# Patient Record
Sex: MALE | Race: WHITE | HISPANIC OR LATINO | Employment: FULL TIME | URBAN - METROPOLITAN AREA
[De-identification: names, ages, dates, MRNs, and addresses within clinical notes are randomized per-mention and may not be internally consistent; named-entity substitution may affect disease eponyms.]

---

## 2017-10-16 ENCOUNTER — APPOINTMENT (OUTPATIENT)
Dept: SOCIAL WORK | Facility: CLINIC | Age: 52
End: 2017-10-16

## 2017-10-16 PROCEDURE — 90686 IIV4 VACC NO PRSV 0.5 ML IM: CPT | Performed by: REGISTERED NURSE

## 2018-10-03 ENCOUNTER — IMMUNIZATION (OUTPATIENT)
Dept: SOCIAL WORK | Facility: CLINIC | Age: 53
End: 2018-10-03

## 2018-10-03 DIAGNOSIS — Z23 NEED FOR VACCINATION: ICD-10-CM

## 2018-10-03 PROCEDURE — 90686 IIV4 VACC NO PRSV 0.5 ML IM: CPT | Performed by: REGISTERED NURSE

## 2019-09-18 ENCOUNTER — HOSPITAL ENCOUNTER (OUTPATIENT)
Dept: LAB | Facility: MEDICAL CENTER | Age: 54
End: 2019-09-18
Attending: FAMILY MEDICINE
Payer: COMMERCIAL

## 2019-09-18 LAB
CHOLEST SERPL-MCNC: 119 MG/DL (ref 100–199)
CREAT UR-MCNC: 86.8 MG/DL
EST. AVERAGE GLUCOSE BLD GHB EST-MCNC: 171 MG/DL
HBA1C MFR BLD: 7.6 % (ref 0–5.6)
HDLC SERPL-MCNC: 53 MG/DL
LDLC SERPL CALC-MCNC: 43 MG/DL
MICROALBUMIN UR-MCNC: 1.1 MG/DL
MICROALBUMIN/CREAT UR: 13 MG/G (ref 0–30)
TRIGL SERPL-MCNC: 115 MG/DL (ref 0–149)

## 2019-09-18 PROCEDURE — 83036 HEMOGLOBIN GLYCOSYLATED A1C: CPT

## 2019-09-18 PROCEDURE — 36415 COLL VENOUS BLD VENIPUNCTURE: CPT

## 2019-09-18 PROCEDURE — 82043 UR ALBUMIN QUANTITATIVE: CPT

## 2019-09-18 PROCEDURE — 82570 ASSAY OF URINE CREATININE: CPT

## 2019-09-18 PROCEDURE — 80061 LIPID PANEL: CPT

## 2020-01-20 ENCOUNTER — HOSPITAL ENCOUNTER (EMERGENCY)
Facility: MEDICAL CENTER | Age: 55
End: 2020-01-20
Attending: EMERGENCY MEDICINE
Payer: COMMERCIAL

## 2020-01-20 ENCOUNTER — APPOINTMENT (OUTPATIENT)
Dept: RADIOLOGY | Facility: MEDICAL CENTER | Age: 55
End: 2020-01-20
Attending: EMERGENCY MEDICINE
Payer: COMMERCIAL

## 2020-01-20 VITALS
HEART RATE: 93 BPM | DIASTOLIC BLOOD PRESSURE: 88 MMHG | BODY MASS INDEX: 33.51 KG/M2 | SYSTOLIC BLOOD PRESSURE: 143 MMHG | WEIGHT: 182.1 LBS | TEMPERATURE: 98.1 F | HEIGHT: 62 IN | OXYGEN SATURATION: 99 % | RESPIRATION RATE: 16 BRPM

## 2020-01-20 DIAGNOSIS — S90.111A CONTUSION OF RIGHT GREAT TOE WITHOUT DAMAGE TO NAIL, INITIAL ENCOUNTER: ICD-10-CM

## 2020-01-20 PROCEDURE — A9270 NON-COVERED ITEM OR SERVICE: HCPCS | Performed by: EMERGENCY MEDICINE

## 2020-01-20 PROCEDURE — 700102 HCHG RX REV CODE 250 W/ 637 OVERRIDE(OP): Performed by: EMERGENCY MEDICINE

## 2020-01-20 PROCEDURE — 99284 EMERGENCY DEPT VISIT MOD MDM: CPT

## 2020-01-20 PROCEDURE — 73630 X-RAY EXAM OF FOOT: CPT | Mod: RT

## 2020-01-20 RX ORDER — HYDROCODONE BITARTRATE AND ACETAMINOPHEN 5; 325 MG/1; MG/1
1 TABLET ORAL ONCE
Status: COMPLETED | OUTPATIENT
Start: 2020-01-20 | End: 2020-01-20

## 2020-01-20 RX ORDER — IBUPROFEN 600 MG/1
600 TABLET ORAL ONCE
Status: DISCONTINUED | OUTPATIENT
Start: 2020-01-20 | End: 2020-01-20 | Stop reason: HOSPADM

## 2020-01-20 RX ORDER — NAPROXEN 500 MG/1
500 TABLET ORAL
Qty: 20 TAB | Refills: 0 | Status: SHIPPED | OUTPATIENT
Start: 2020-01-20 | End: 2020-01-25

## 2020-01-20 RX ADMIN — HYDROCODONE BITARTRATE AND ACETAMINOPHEN 1 TABLET: 5; 325 TABLET ORAL at 06:13

## 2020-01-20 NOTE — ED NOTES
MD at bedside prior to discharge. Pt given discharge instructions and verbalized understanding with prescription stapled to discharge paperwork, all questions are answered, signed copy in chart. Pt ambulatory to lobby, gait steady, discharged to self. Pt took all belongings from room.

## 2020-01-20 NOTE — ED PROVIDER NOTES
"ED Provider Note      ER PROVIDER NOTE    CHIEF COMPLAINT  Chief Complaint   Patient presents with   • Ankle Injury     missed a step on Friday, rolled his ankle. notably swollen, says he has been walking on it and working, pain and swelling has not gone down. taking ibuprofen without relief       HPI  Rob Webster is a 54 y.o. male who presents to the emergency department complaining of right toe injury.  Patient reports that on Friday due to the ice he slipped on a step plantar flexing his toe, resulting in pain and swelling to the great toe and forefoot.  He has been ambulatory but with pain since the incident swelling does not seem to be improving.  He denies any actual ankle pain or knee pain.  Denies any focal weakness numbness or tingling.  No fevers or chills    No history of immunocompromise    REVIEW OF SYSTEMS  Pertinent positives include toe pain. Pertinent negatives include no ankle or knee pain. See HPI for details. All other systems reviewed and are negative.    PAST MEDICAL HISTORY   has a past medical history of Glaucoma (5/23/2012).    SOCIAL HISTORY  Social History     Tobacco Use   • Smoking status: Never Smoker   • Smokeless tobacco: Never Used   Substance Use Topics   • Alcohol use: No   • Drug use: Not on file       SURGICAL HISTORY  patient denies any surgical history    CURRENT MEDICATIONS  Home Medications    **Home medications have not yet been reviewed for this encounter**         ALLERGIES  No Known Allergies    PHYSICAL EXAM  VITAL SIGNS: /88   Pulse 93   Temp 36.7 °C (98.1 °F) (Oral)   Resp 16   Ht 1.575 m (5' 2\")   Wt 82.6 kg (182 lb 1.6 oz)   SpO2 99%   BMI 33.31 kg/m²   Pulse ox interpretation: I interpret this pulse ox as normal.    Constitutional: Alert.  In no apparent distress.  HENT: Normocephalic, Atraumatic, Bilateral external ears normal. Nose normal.   Eyes: Pupils are equal and reactive. Conjunctiva normal, non-icteric.   Heart: Regular rate and " rhythm, no murmurs.    Lungs: Clear to auscultation bilaterally.  Skin: Warm, Dry, No erythema, No rash.   Musculoskeletal: Erythema and edema noted over the first digit of the right foot with some associated tenderness through the phalanx and metatarsal of that digit, no lateral tenderness over the foot, nontender throughout the ankle, negative squeeze, nontender throughout the knee, described the refill less than 2 seconds, distal sensation intact light touch, otherwise no tenderness or major deformities noted. No edema.  Neurologic: Alert, Grossly non-focal.   Psychiatric: Affect normal, Judgment normal, Mood normal, Appears appropriate and not intoxicated.     DIAGNOSTIC STUDIES / PROCEDURES      RADIOLOGY  DX-FOOT-COMPLETE 3+ RIGHT   Final Result      Soft tissue swelling without acute osseous abnormality. If pain persists, repeat radiographs in 7-10 days is recommended.        The radiologist's interpretation of all radiological studies have been reviewed by me.    COURSE & MEDICAL DECISION MAKING  Nursing notes, VS, PMSFHx reviewed in chart.    5:56 AM - Patient seen and examined at bedside. Patient will be treated with ibuprofen. Ordered for foot x-ray to evaluate his symptoms.     6:29 AM patient reevaluated, updated on results, will plan for open toe shoe, alvaro taping which was explained to the patient, and discharge          Decision Making:  This is a 54 y.o. male present with injury to his toe. x-ray demonstrates no fracture or dislocation.  And pt has no obvious clinical findings to suggest this.  I did discuss the possibility of an occult fracture, as well as a followup and home care as documented in discharge instructions. no e/o compartment sx , no e/o neurovascular compromise,and no other injury noted.  No infectious symptoms and mechanism would suggest against infectious cause for his symptoms. discussed indications for return including new/worse pain, numbness or cool extremity, worsened  swelling, and pale color to extremity. Pt understood well.  The patient will return for new or worsening symptoms and is stable at the time of discharge.    The patient is referred to a primary physician for blood pressure management, diabetic screening, and for all other preventative health concerns.    DISPOSITION:  Patient will be discharged home in stable condition.    FOLLOW UP:  Galilea Reyes M.D.  [id number]  [address]  [facility] [address]  [phone]    In 1 week        OUTPATIENT MEDICATIONS:  Discharge Medication List as of 1/20/2020  6:30 AM      START taking these medications    Details   naproxen (NAPROSYN) 500 MG Tab Take 1 Tab by mouth 2 times daily with meals as needed (pain) for up to 5 days., Disp-20 Tab, R-0, Print Rx Paper               FINAL IMPRESSION  1. Contusion of right great toe without damage to nail, initial encounter        The note accurately reflects work and decisions made by me.  Darrell Quiroz M.D.  1/20/2020  6:54 AM

## 2020-01-20 NOTE — ED TRIAGE NOTES
"Rob Webster   54 y.o. male   Chief Complaint   Patient presents with   • Ankle Injury     missed a step on Friday, rolled his ankle. notably swollen, says he has been walking on it and working, pain and swelling has not gone down. taking ibuprofen without relief      Pt amb to triage with limping gait for above complaint. + CMS to RLE     Pt is alert and oriented, speaking in full sentences, follows commands and responds appropriately to questions. Resp are even and unlabored.   Pt placed in lobby. Pt educated on triage process. Pt encouraged to alert staff for any changes.    /88   Pulse 93   Temp 36.7 °C (98.1 °F) (Oral)   Resp 16   Ht 1.575 m (5' 2\")   Wt 82.6 kg (182 lb 1.6 oz)   SpO2 99%   BMI 33.31 kg/m²     "

## 2020-01-20 NOTE — ED NOTES
Pt medicated per MAR and educated on purpose/side effects of medication. Pt verbalizes understanding and tolerates well at this time. Will continue to monitor.

## 2020-01-20 NOTE — ED NOTES
Pt wheeled to hospital room via wheelchair. Noted swelling and redness to pt's big toe, cms in tact. Pt's foot elevated, ice pack on place. Erp to see.

## 2020-01-21 ENCOUNTER — HOSPITAL ENCOUNTER (OUTPATIENT)
Dept: LAB | Facility: MEDICAL CENTER | Age: 55
End: 2020-01-21
Attending: FAMILY MEDICINE
Payer: COMMERCIAL

## 2020-01-21 LAB
CHOLEST SERPL-MCNC: 140 MG/DL (ref 100–199)
EST. AVERAGE GLUCOSE BLD GHB EST-MCNC: 151 MG/DL
HBA1C MFR BLD: 6.9 % (ref 0–5.6)
HDLC SERPL-MCNC: 43 MG/DL
LDLC SERPL CALC-MCNC: 66 MG/DL
TRIGL SERPL-MCNC: 156 MG/DL (ref 0–149)

## 2020-01-21 PROCEDURE — 36415 COLL VENOUS BLD VENIPUNCTURE: CPT

## 2020-01-21 PROCEDURE — 83036 HEMOGLOBIN GLYCOSYLATED A1C: CPT

## 2020-01-21 PROCEDURE — 80061 LIPID PANEL: CPT

## 2020-02-03 ENCOUNTER — APPOINTMENT (OUTPATIENT)
Dept: RADIOLOGY | Facility: MEDICAL CENTER | Age: 55
DRG: 854 | End: 2020-02-03
Attending: EMERGENCY MEDICINE
Payer: COMMERCIAL

## 2020-02-03 ENCOUNTER — HOSPITAL ENCOUNTER (INPATIENT)
Facility: MEDICAL CENTER | Age: 55
LOS: 11 days | DRG: 854 | End: 2020-02-14
Attending: EMERGENCY MEDICINE | Admitting: HOSPITALIST
Payer: COMMERCIAL

## 2020-02-03 DIAGNOSIS — M86.9 OSTEOMYELITIS OF RIGHT FOOT, UNSPECIFIED TYPE (HCC): ICD-10-CM

## 2020-02-03 DIAGNOSIS — A41.9 SEPSIS, DUE TO UNSPECIFIED ORGANISM, UNSPECIFIED WHETHER ACUTE ORGAN DYSFUNCTION PRESENT (HCC): Primary | ICD-10-CM

## 2020-02-03 DIAGNOSIS — M86.9 OSTEOMYELITIS OF GREAT TOE OF RIGHT FOOT (HCC): ICD-10-CM

## 2020-02-03 DIAGNOSIS — L08.9 DIABETIC FOOT INFECTION (HCC): ICD-10-CM

## 2020-02-03 DIAGNOSIS — E11.628 DIABETIC FOOT INFECTION (HCC): ICD-10-CM

## 2020-02-03 DIAGNOSIS — L03.031 CELLULITIS OF TOE, RIGHT: ICD-10-CM

## 2020-02-03 PROBLEM — E11.9 DM (DIABETES MELLITUS) (HCC): Status: ACTIVE | Noted: 2020-02-03

## 2020-02-03 PROBLEM — E78.5 HYPERLIPIDEMIA: Status: ACTIVE | Noted: 2020-02-03

## 2020-02-03 PROBLEM — M79.674 TOE PAIN, RIGHT: Status: ACTIVE | Noted: 2020-02-03

## 2020-02-03 LAB
ALBUMIN SERPL BCP-MCNC: 4.1 G/DL (ref 3.2–4.9)
ALBUMIN/GLOB SERPL: 1 G/DL
ALP SERPL-CCNC: 218 U/L (ref 30–99)
ALT SERPL-CCNC: 50 U/L (ref 2–50)
ANION GAP SERPL CALC-SCNC: 13 MMOL/L (ref 0–11.9)
AST SERPL-CCNC: 22 U/L (ref 12–45)
BASOPHILS # BLD AUTO: 0.8 % (ref 0–1.8)
BASOPHILS # BLD: 0.1 K/UL (ref 0–0.12)
BILIRUB SERPL-MCNC: 0.9 MG/DL (ref 0.1–1.5)
BUN SERPL-MCNC: 11 MG/DL (ref 8–22)
CALCIUM SERPL-MCNC: 9.4 MG/DL (ref 8.5–10.5)
CHLORIDE SERPL-SCNC: 97 MMOL/L (ref 96–112)
CO2 SERPL-SCNC: 22 MMOL/L (ref 20–33)
CREAT SERPL-MCNC: 0.78 MG/DL (ref 0.5–1.4)
CRP SERPL HS-MCNC: 24.1 MG/DL (ref 0–0.75)
EOSINOPHIL # BLD AUTO: 0.09 K/UL (ref 0–0.51)
EOSINOPHIL NFR BLD: 0.7 % (ref 0–6.9)
ERYTHROCYTE [DISTWIDTH] IN BLOOD BY AUTOMATED COUNT: 37.8 FL (ref 35.9–50)
ERYTHROCYTE [SEDIMENTATION RATE] IN BLOOD BY WESTERGREN METHOD: 93 MM/HOUR (ref 0–20)
GLOBULIN SER CALC-MCNC: 4.3 G/DL (ref 1.9–3.5)
GLUCOSE BLD-MCNC: 219 MG/DL (ref 65–99)
GLUCOSE SERPL-MCNC: 245 MG/DL (ref 65–99)
HCT VFR BLD AUTO: 41.6 % (ref 42–52)
HGB BLD-MCNC: 14 G/DL (ref 14–18)
IMM GRANULOCYTES # BLD AUTO: 0.26 K/UL (ref 0–0.11)
IMM GRANULOCYTES NFR BLD AUTO: 2 % (ref 0–0.9)
LACTATE BLD-SCNC: 1.7 MMOL/L (ref 0.5–2)
LYMPHOCYTES # BLD AUTO: 1.22 K/UL (ref 1–4.8)
LYMPHOCYTES NFR BLD: 9.3 % (ref 22–41)
MCH RBC QN AUTO: 28.1 PG (ref 27–33)
MCHC RBC AUTO-ENTMCNC: 33.7 G/DL (ref 33.7–35.3)
MCV RBC AUTO: 83.4 FL (ref 81.4–97.8)
MONOCYTES # BLD AUTO: 1.2 K/UL (ref 0–0.85)
MONOCYTES NFR BLD AUTO: 9.1 % (ref 0–13.4)
NEUTROPHILS # BLD AUTO: 10.3 K/UL (ref 1.82–7.42)
NEUTROPHILS NFR BLD: 78.1 % (ref 44–72)
NRBC # BLD AUTO: 0 K/UL
NRBC BLD-RTO: 0 /100 WBC
PLATELET # BLD AUTO: 361 K/UL (ref 164–446)
PMV BLD AUTO: 9.2 FL (ref 9–12.9)
POTASSIUM SERPL-SCNC: 4.1 MMOL/L (ref 3.6–5.5)
PROT SERPL-MCNC: 8.4 G/DL (ref 6–8.2)
RBC # BLD AUTO: 4.99 M/UL (ref 4.7–6.1)
SODIUM SERPL-SCNC: 132 MMOL/L (ref 135–145)
WBC # BLD AUTO: 13.2 K/UL (ref 4.8–10.8)

## 2020-02-03 PROCEDURE — 85025 COMPLETE CBC W/AUTO DIFF WBC: CPT

## 2020-02-03 PROCEDURE — 99285 EMERGENCY DEPT VISIT HI MDM: CPT

## 2020-02-03 PROCEDURE — 700105 HCHG RX REV CODE 258: Performed by: EMERGENCY MEDICINE

## 2020-02-03 PROCEDURE — 700111 HCHG RX REV CODE 636 W/ 250 OVERRIDE (IP): Performed by: STUDENT IN AN ORGANIZED HEALTH CARE EDUCATION/TRAINING PROGRAM

## 2020-02-03 PROCEDURE — 73630 X-RAY EXAM OF FOOT: CPT | Mod: RT

## 2020-02-03 PROCEDURE — 85652 RBC SED RATE AUTOMATED: CPT

## 2020-02-03 PROCEDURE — 96365 THER/PROPH/DIAG IV INF INIT: CPT

## 2020-02-03 PROCEDURE — 770006 HCHG ROOM/CARE - MED/SURG/GYN SEMI*

## 2020-02-03 PROCEDURE — 700105 HCHG RX REV CODE 258: Performed by: STUDENT IN AN ORGANIZED HEALTH CARE EDUCATION/TRAINING PROGRAM

## 2020-02-03 PROCEDURE — 80053 COMPREHEN METABOLIC PANEL: CPT

## 2020-02-03 PROCEDURE — 96366 THER/PROPH/DIAG IV INF ADDON: CPT

## 2020-02-03 PROCEDURE — 82962 GLUCOSE BLOOD TEST: CPT

## 2020-02-03 PROCEDURE — 87040 BLOOD CULTURE FOR BACTERIA: CPT | Mod: 91

## 2020-02-03 PROCEDURE — 87150 DNA/RNA AMPLIFIED PROBE: CPT

## 2020-02-03 PROCEDURE — 87077 CULTURE AEROBIC IDENTIFY: CPT

## 2020-02-03 PROCEDURE — 700102 HCHG RX REV CODE 250 W/ 637 OVERRIDE(OP): Performed by: STUDENT IN AN ORGANIZED HEALTH CARE EDUCATION/TRAINING PROGRAM

## 2020-02-03 PROCEDURE — 700111 HCHG RX REV CODE 636 W/ 250 OVERRIDE (IP): Performed by: EMERGENCY MEDICINE

## 2020-02-03 PROCEDURE — 96367 TX/PROPH/DG ADDL SEQ IV INF: CPT

## 2020-02-03 PROCEDURE — 86140 C-REACTIVE PROTEIN: CPT

## 2020-02-03 PROCEDURE — 83605 ASSAY OF LACTIC ACID: CPT

## 2020-02-03 PROCEDURE — A9270 NON-COVERED ITEM OR SERVICE: HCPCS | Performed by: STUDENT IN AN ORGANIZED HEALTH CARE EDUCATION/TRAINING PROGRAM

## 2020-02-03 PROCEDURE — 87186 SC STD MICRODIL/AGAR DIL: CPT

## 2020-02-03 RX ORDER — NAPROXEN 500 MG/1
500 TABLET ORAL 2 TIMES DAILY WITH MEALS
Status: ON HOLD | COMMUNITY
End: 2020-02-14

## 2020-02-03 RX ORDER — BISACODYL 10 MG
10 SUPPOSITORY, RECTAL RECTAL
Status: DISCONTINUED | OUTPATIENT
Start: 2020-02-03 | End: 2020-02-14 | Stop reason: HOSPADM

## 2020-02-03 RX ORDER — POLYETHYLENE GLYCOL 3350 17 G/17G
1 POWDER, FOR SOLUTION ORAL
Status: DISCONTINUED | OUTPATIENT
Start: 2020-02-03 | End: 2020-02-14 | Stop reason: HOSPADM

## 2020-02-03 RX ORDER — SODIUM CHLORIDE 9 MG/ML
INJECTION, SOLUTION INTRAVENOUS
Status: ACTIVE
Start: 2020-02-03 | End: 2020-02-04

## 2020-02-03 RX ORDER — AMOXICILLIN 250 MG
2 CAPSULE ORAL 2 TIMES DAILY
Status: DISCONTINUED | OUTPATIENT
Start: 2020-02-03 | End: 2020-02-14 | Stop reason: HOSPADM

## 2020-02-03 RX ORDER — ATORVASTATIN CALCIUM 40 MG/1
40 TABLET, FILM COATED ORAL DAILY
COMMUNITY
Start: 2020-01-04

## 2020-02-03 RX ORDER — M-VIT,TX,IRON,MINS/CALC/FOLIC 27MG-0.4MG
1 TABLET ORAL DAILY
COMMUNITY

## 2020-02-03 RX ORDER — HYDROCODONE BITARTRATE AND ACETAMINOPHEN 10; 325 MG/1; MG/1
1 TABLET ORAL EVERY 6 HOURS PRN
Status: DISCONTINUED | OUTPATIENT
Start: 2020-02-03 | End: 2020-02-14 | Stop reason: HOSPADM

## 2020-02-03 RX ORDER — ATORVASTATIN CALCIUM 40 MG/1
40 TABLET, FILM COATED ORAL DAILY
Status: DISCONTINUED | OUTPATIENT
Start: 2020-02-04 | End: 2020-02-14 | Stop reason: HOSPADM

## 2020-02-03 RX ORDER — ACETAMINOPHEN 325 MG/1
650 TABLET ORAL EVERY 6 HOURS PRN
Status: DISCONTINUED | OUTPATIENT
Start: 2020-02-03 | End: 2020-02-14 | Stop reason: HOSPADM

## 2020-02-03 RX ORDER — SODIUM CHLORIDE 9 MG/ML
INJECTION, SOLUTION INTRAVENOUS CONTINUOUS
Status: DISCONTINUED | OUTPATIENT
Start: 2020-02-03 | End: 2020-02-04

## 2020-02-03 RX ORDER — M-VIT,TX,IRON,MINS/CALC/FOLIC 27MG-0.4MG
1 TABLET ORAL DAILY
Status: DISCONTINUED | OUTPATIENT
Start: 2020-02-04 | End: 2020-02-14 | Stop reason: HOSPADM

## 2020-02-03 RX ADMIN — AMPICILLIN SODIUM AND SULBACTAM SODIUM 3 G: 2; 1 INJECTION, POWDER, FOR SOLUTION INTRAMUSCULAR; INTRAVENOUS at 18:40

## 2020-02-03 RX ADMIN — INSULIN HUMAN 2 UNITS: 100 INJECTION, SOLUTION PARENTERAL at 22:19

## 2020-02-03 RX ADMIN — SODIUM CHLORIDE: 9 INJECTION, SOLUTION INTRAVENOUS at 22:18

## 2020-02-03 RX ADMIN — VANCOMYCIN HYDROCHLORIDE 2000 MG: 500 INJECTION, POWDER, LYOPHILIZED, FOR SOLUTION INTRAVENOUS at 19:15

## 2020-02-03 RX ADMIN — SENNOSIDES AND DOCUSATE SODIUM 2 TABLET: 8.6; 5 TABLET ORAL at 22:17

## 2020-02-03 RX ADMIN — AMPICILLIN SODIUM AND SULBACTAM SODIUM 3 G: 2; 1 INJECTION, POWDER, FOR SOLUTION INTRAMUSCULAR; INTRAVENOUS at 23:38

## 2020-02-03 ASSESSMENT — ENCOUNTER SYMPTOMS
TREMORS: 0
ABDOMINAL PAIN: 0
BACK PAIN: 0
PHOTOPHOBIA: 0
BRUISES/BLEEDS EASILY: 0
FEVER: 0
SPUTUM PRODUCTION: 0
HEMOPTYSIS: 0
NECK PAIN: 0
DOUBLE VISION: 0
CHILLS: 0
NAUSEA: 0
PALPITATIONS: 0
TINGLING: 0

## 2020-02-03 ASSESSMENT — COPD QUESTIONNAIRES
HAVE YOU SMOKED AT LEAST 100 CIGARETTES IN YOUR ENTIRE LIFE: NO/DON'T KNOW
DO YOU EVER COUGH UP ANY MUCUS OR PHLEGM?: NO/ONLY WITH OCCASIONAL COLDS OR INFECTIONS
DURING THE PAST 4 WEEKS HOW MUCH DID YOU FEEL SHORT OF BREATH: NONE/LITTLE OF THE TIME
COPD SCREENING SCORE: 1
IN THE PAST 12 MONTHS DO YOU DO LESS THAN YOU USED TO BECAUSE OF YOUR BREATHING PROBLEMS: DISAGREE/UNSURE

## 2020-02-03 ASSESSMENT — LIFESTYLE VARIABLES
TOTAL SCORE: 0
HOW MANY TIMES IN THE PAST YEAR HAVE YOU HAD 5 OR MORE DRINKS IN A DAY: 0
AVERAGE NUMBER OF DAYS PER WEEK YOU HAVE A DRINK CONTAINING ALCOHOL: 0
SUBSTANCE_ABUSE: 0
TOTAL SCORE: 0
ON A TYPICAL DAY WHEN YOU DRINK ALCOHOL HOW MANY DRINKS DO YOU HAVE: 0
EVER FELT BAD OR GUILTY ABOUT YOUR DRINKING: NO
DOES PATIENT WANT TO STOP DRINKING: NO
TOTAL SCORE: 0
EVER HAD A DRINK FIRST THING IN THE MORNING TO STEADY YOUR NERVES TO GET RID OF A HANGOVER: NO
ALCOHOL_USE: NO
HAVE YOU EVER FELT YOU SHOULD CUT DOWN ON YOUR DRINKING: NO
HAVE PEOPLE ANNOYED YOU BY CRITICIZING YOUR DRINKING: NO
CONSUMPTION TOTAL: NEGATIVE
EVER_SMOKED: NEVER

## 2020-02-03 ASSESSMENT — COGNITIVE AND FUNCTIONAL STATUS - GENERAL
MOBILITY SCORE: 24
DAILY ACTIVITIY SCORE: 24
SUGGESTED CMS G CODE MODIFIER MOBILITY: CH
SUGGESTED CMS G CODE MODIFIER DAILY ACTIVITY: CH

## 2020-02-03 ASSESSMENT — PATIENT HEALTH QUESTIONNAIRE - PHQ9
SUM OF ALL RESPONSES TO PHQ9 QUESTIONS 1 AND 2: 0
2. FEELING DOWN, DEPRESSED, IRRITABLE, OR HOPELESS: NOT AT ALL
1. LITTLE INTEREST OR PLEASURE IN DOING THINGS: NOT AT ALL

## 2020-02-04 ENCOUNTER — APPOINTMENT (OUTPATIENT)
Dept: RADIOLOGY | Facility: MEDICAL CENTER | Age: 55
DRG: 854 | End: 2020-02-04
Attending: STUDENT IN AN ORGANIZED HEALTH CARE EDUCATION/TRAINING PROGRAM
Payer: COMMERCIAL

## 2020-02-04 PROBLEM — M86.9 OSTEOMYELITIS OF GREAT TOE OF RIGHT FOOT (HCC): Status: ACTIVE | Noted: 2020-02-04

## 2020-02-04 PROBLEM — L03.031 CELLULITIS OF TOE, RIGHT: Status: ACTIVE | Noted: 2020-02-03

## 2020-02-04 LAB
ALBUMIN SERPL BCP-MCNC: 3.3 G/DL (ref 3.2–4.9)
ALBUMIN/GLOB SERPL: 0.9 G/DL
ALP SERPL-CCNC: 174 U/L (ref 30–99)
ALT SERPL-CCNC: 39 U/L (ref 2–50)
ANION GAP SERPL CALC-SCNC: 8 MMOL/L (ref 0–11.9)
AST SERPL-CCNC: 14 U/L (ref 12–45)
BASOPHILS # BLD AUTO: 0.5 % (ref 0–1.8)
BASOPHILS # BLD: 0.07 K/UL (ref 0–0.12)
BILIRUB SERPL-MCNC: 0.9 MG/DL (ref 0.1–1.5)
BUN SERPL-MCNC: 9 MG/DL (ref 8–22)
CALCIUM SERPL-MCNC: 8.9 MG/DL (ref 8.5–10.5)
CHLORIDE SERPL-SCNC: 100 MMOL/L (ref 96–112)
CO2 SERPL-SCNC: 24 MMOL/L (ref 20–33)
CREAT SERPL-MCNC: 0.61 MG/DL (ref 0.5–1.4)
EKG IMPRESSION: NORMAL
EOSINOPHIL # BLD AUTO: 0.11 K/UL (ref 0–0.51)
EOSINOPHIL NFR BLD: 0.9 % (ref 0–6.9)
ERYTHROCYTE [DISTWIDTH] IN BLOOD BY AUTOMATED COUNT: 37.4 FL (ref 35.9–50)
GLOBULIN SER CALC-MCNC: 3.8 G/DL (ref 1.9–3.5)
GLUCOSE BLD-MCNC: 223 MG/DL (ref 65–99)
GLUCOSE BLD-MCNC: 223 MG/DL (ref 65–99)
GLUCOSE BLD-MCNC: 251 MG/DL (ref 65–99)
GLUCOSE SERPL-MCNC: 205 MG/DL (ref 65–99)
HCT VFR BLD AUTO: 36.6 % (ref 42–52)
HGB BLD-MCNC: 12.2 G/DL (ref 14–18)
IMM GRANULOCYTES # BLD AUTO: 0.25 K/UL (ref 0–0.11)
IMM GRANULOCYTES NFR BLD AUTO: 2 % (ref 0–0.9)
LYMPHOCYTES # BLD AUTO: 1.33 K/UL (ref 1–4.8)
LYMPHOCYTES NFR BLD: 10.4 % (ref 22–41)
MCH RBC QN AUTO: 27.8 PG (ref 27–33)
MCHC RBC AUTO-ENTMCNC: 33.3 G/DL (ref 33.7–35.3)
MCV RBC AUTO: 83.4 FL (ref 81.4–97.8)
MONOCYTES # BLD AUTO: 1.42 K/UL (ref 0–0.85)
MONOCYTES NFR BLD AUTO: 11.1 % (ref 0–13.4)
NEUTROPHILS # BLD AUTO: 9.59 K/UL (ref 1.82–7.42)
NEUTROPHILS NFR BLD: 75.1 % (ref 44–72)
NRBC # BLD AUTO: 0 K/UL
NRBC BLD-RTO: 0 /100 WBC
PLATELET # BLD AUTO: 338 K/UL (ref 164–446)
PMV BLD AUTO: 9.1 FL (ref 9–12.9)
POTASSIUM SERPL-SCNC: 3.6 MMOL/L (ref 3.6–5.5)
PROT SERPL-MCNC: 7.1 G/DL (ref 6–8.2)
RBC # BLD AUTO: 4.39 M/UL (ref 4.7–6.1)
SODIUM SERPL-SCNC: 132 MMOL/L (ref 135–145)
URATE SERPL-MCNC: 3.6 MG/DL (ref 2.5–8.3)
WBC # BLD AUTO: 12.8 K/UL (ref 4.8–10.8)

## 2020-02-04 PROCEDURE — 93975 VASCULAR STUDY: CPT

## 2020-02-04 PROCEDURE — 700102 HCHG RX REV CODE 250 W/ 637 OVERRIDE(OP): Performed by: INTERNAL MEDICINE

## 2020-02-04 PROCEDURE — 36415 COLL VENOUS BLD VENIPUNCTURE: CPT

## 2020-02-04 PROCEDURE — 93010 ELECTROCARDIOGRAM REPORT: CPT | Performed by: INTERNAL MEDICINE

## 2020-02-04 PROCEDURE — 700117 HCHG RX CONTRAST REV CODE 255: Performed by: STUDENT IN AN ORGANIZED HEALTH CARE EDUCATION/TRAINING PROGRAM

## 2020-02-04 PROCEDURE — 99223 1ST HOSP IP/OBS HIGH 75: CPT | Mod: GC | Performed by: INTERNAL MEDICINE

## 2020-02-04 PROCEDURE — 99222 1ST HOSP IP/OBS MODERATE 55: CPT | Performed by: NURSE PRACTITIONER

## 2020-02-04 PROCEDURE — A9576 INJ PROHANCE MULTIPACK: HCPCS | Performed by: STUDENT IN AN ORGANIZED HEALTH CARE EDUCATION/TRAINING PROGRAM

## 2020-02-04 PROCEDURE — 93975 VASCULAR STUDY: CPT | Mod: 26 | Performed by: INTERNAL MEDICINE

## 2020-02-04 PROCEDURE — A9270 NON-COVERED ITEM OR SERVICE: HCPCS | Performed by: STUDENT IN AN ORGANIZED HEALTH CARE EDUCATION/TRAINING PROGRAM

## 2020-02-04 PROCEDURE — 700111 HCHG RX REV CODE 636 W/ 250 OVERRIDE (IP): Performed by: STUDENT IN AN ORGANIZED HEALTH CARE EDUCATION/TRAINING PROGRAM

## 2020-02-04 PROCEDURE — 93005 ELECTROCARDIOGRAM TRACING: CPT | Performed by: STUDENT IN AN ORGANIZED HEALTH CARE EDUCATION/TRAINING PROGRAM

## 2020-02-04 PROCEDURE — 700102 HCHG RX REV CODE 250 W/ 637 OVERRIDE(OP): Performed by: STUDENT IN AN ORGANIZED HEALTH CARE EDUCATION/TRAINING PROGRAM

## 2020-02-04 PROCEDURE — 85025 COMPLETE CBC W/AUTO DIFF WBC: CPT

## 2020-02-04 PROCEDURE — 700105 HCHG RX REV CODE 258: Performed by: STUDENT IN AN ORGANIZED HEALTH CARE EDUCATION/TRAINING PROGRAM

## 2020-02-04 PROCEDURE — 90715 TDAP VACCINE 7 YRS/> IM: CPT | Performed by: STUDENT IN AN ORGANIZED HEALTH CARE EDUCATION/TRAINING PROGRAM

## 2020-02-04 PROCEDURE — 84550 ASSAY OF BLOOD/URIC ACID: CPT

## 2020-02-04 PROCEDURE — 770006 HCHG ROOM/CARE - MED/SURG/GYN SEMI*

## 2020-02-04 PROCEDURE — 3E0234Z INTRODUCTION OF SERUM, TOXOID AND VACCINE INTO MUSCLE, PERCUTANEOUS APPROACH: ICD-10-PCS | Performed by: FAMILY MEDICINE

## 2020-02-04 PROCEDURE — 700105 HCHG RX REV CODE 258: Performed by: INTERNAL MEDICINE

## 2020-02-04 PROCEDURE — 73720 MRI LWR EXTREMITY W/O&W/DYE: CPT | Mod: RT

## 2020-02-04 PROCEDURE — 80053 COMPREHEN METABOLIC PANEL: CPT

## 2020-02-04 PROCEDURE — 82962 GLUCOSE BLOOD TEST: CPT | Mod: 91

## 2020-02-04 RX ORDER — LISINOPRIL 2.5 MG/1
5 TABLET ORAL
Status: DISCONTINUED | OUTPATIENT
Start: 2020-02-04 | End: 2020-02-04

## 2020-02-04 RX ORDER — INSULIN GLARGINE 100 [IU]/ML
10 INJECTION, SOLUTION SUBCUTANEOUS EVERY EVENING
Status: DISCONTINUED | OUTPATIENT
Start: 2020-02-04 | End: 2020-02-10

## 2020-02-04 RX ORDER — SODIUM CHLORIDE 9 MG/ML
INJECTION, SOLUTION INTRAVENOUS CONTINUOUS
Status: DISCONTINUED | OUTPATIENT
Start: 2020-02-04 | End: 2020-02-06

## 2020-02-04 RX ORDER — LISINOPRIL 10 MG/1
10 TABLET ORAL
Status: DISCONTINUED | OUTPATIENT
Start: 2020-02-04 | End: 2020-02-04

## 2020-02-04 RX ADMIN — AMPICILLIN SODIUM AND SULBACTAM SODIUM 3 G: 2; 1 INJECTION, POWDER, FOR SOLUTION INTRAMUSCULAR; INTRAVENOUS at 11:45

## 2020-02-04 RX ADMIN — AMPICILLIN SODIUM AND SULBACTAM SODIUM 3 G: 2; 1 INJECTION, POWDER, FOR SOLUTION INTRAMUSCULAR; INTRAVENOUS at 17:47

## 2020-02-04 RX ADMIN — INSULIN LISPRO 2 UNITS: 100 INJECTION, SOLUTION INTRAVENOUS; SUBCUTANEOUS at 11:45

## 2020-02-04 RX ADMIN — INSULIN GLARGINE 10 UNITS: 100 INJECTION, SOLUTION SUBCUTANEOUS at 18:43

## 2020-02-04 RX ADMIN — CLOSTRIDIUM TETANI TOXOID ANTIGEN (FORMALDEHYDE INACTIVATED), CORYNEBACTERIUM DIPHTHERIAE TOXOID ANTIGEN (FORMALDEHYDE INACTIVATED), BORDETELLA PERTUSSIS TOXOID ANTIGEN (GLUTARALDEHYDE INACTIVATED), BORDETELLA PERTUSSIS FILAMENTOUS HEMAGGLUTININ ANTIGEN (FORMALDEHYDE INACTIVATED), BORDETELLA PERTUSSIS PERTACTIN ANTIGEN, AND BORDETELLA PERTUSSIS FIMBRIAE 2/3 ANTIGEN 0.5 ML: 5; 2; 2.5; 5; 3; 5 INJECTION, SUSPENSION INTRAMUSCULAR at 18:07

## 2020-02-04 RX ADMIN — SODIUM CHLORIDE: 9 INJECTION, SOLUTION INTRAVENOUS at 23:25

## 2020-02-04 RX ADMIN — AMPICILLIN SODIUM AND SULBACTAM SODIUM 3 G: 2; 1 INJECTION, POWDER, FOR SOLUTION INTRAMUSCULAR; INTRAVENOUS at 23:26

## 2020-02-04 RX ADMIN — HYDROCODONE BITARTRATE AND ACETAMINOPHEN 1 TABLET: 10; 325 TABLET ORAL at 22:53

## 2020-02-04 RX ADMIN — POLYETHYLENE GLYCOL 3350 1 PACKET: 17 POWDER, FOR SOLUTION ORAL at 20:38

## 2020-02-04 RX ADMIN — VANCOMYCIN HYDROCHLORIDE 1400 MG: 500 INJECTION, POWDER, LYOPHILIZED, FOR SOLUTION INTRAVENOUS at 18:42

## 2020-02-04 RX ADMIN — HYDROCODONE BITARTRATE AND ACETAMINOPHEN 1 TABLET: 10; 325 TABLET ORAL at 04:27

## 2020-02-04 RX ADMIN — AMPICILLIN SODIUM AND SULBACTAM SODIUM 3 G: 2; 1 INJECTION, POWDER, FOR SOLUTION INTRAMUSCULAR; INTRAVENOUS at 04:28

## 2020-02-04 RX ADMIN — GADOTERIDOL 15 ML: 279.3 INJECTION, SOLUTION INTRAVENOUS at 14:30

## 2020-02-04 RX ADMIN — VANCOMYCIN HYDROCHLORIDE 1400 MG: 500 INJECTION, POWDER, LYOPHILIZED, FOR SOLUTION INTRAVENOUS at 05:13

## 2020-02-04 ASSESSMENT — ENCOUNTER SYMPTOMS
HEMOPTYSIS: 0
BACK PAIN: 0
DIZZINESS: 0
SPUTUM PRODUCTION: 0
PSYCHIATRIC NEGATIVE: 1
TINGLING: 0
PALPITATIONS: 0
SINUS PAIN: 0
SHORTNESS OF BREATH: 0
MYALGIAS: 0
BRUISES/BLEEDS EASILY: 0
NERVOUS/ANXIOUS: 0
DIARRHEA: 0
COUGH: 0
CONSTIPATION: 0
FALLS: 1
NAUSEA: 0
DOUBLE VISION: 0
VOMITING: 0
HEADACHES: 0
WEAKNESS: 0
BLURRED VISION: 0
BLOOD IN STOOL: 0
FEVER: 0
ABDOMINAL PAIN: 0
CHILLS: 0
DIAPHORESIS: 0
SORE THROAT: 0

## 2020-02-04 ASSESSMENT — LIFESTYLE VARIABLES: SUBSTANCE_ABUSE: 0

## 2020-02-04 NOTE — ED PROVIDER NOTES
ED Provider Note   2/3/2020  5:40 PM    Means of Arrival: Walk In  History obtained by: patient  Limitations: none    CHIEF COMPLAINT  Chief Complaint   Patient presents with   • Foot Pain     R; recent visit for similar issue - worsening pain. Wearing fracture boots as educated prior.       HPI  Rob Webster is a 54 y.o. male with diabetes presents with worsening right toe pain. He was evaluated here on 1/20/2020 for pain at his right toe.  At that time he reported slipping on ice and plantar flexing his toe.  X-ray at that time showed no signs of dislocation or fracture.  He was given a walking boot.  Pain has been improving until this morning when he woke up he noticed significant swelling at his toe.  There is redness spreading up his foot.  Very painful with flexion or extension of the right great toe.  Throbbing pain.  No chills or documented fever at home.    REVIEW OF SYSTEMS  Review of Systems   Constitutional: Negative for chills and fever.   Musculoskeletal:        See msk exam     Skin: Negative for rash.   All other systems reviewed and are negative.    See HPI for further details.     PAST MEDICAL HISTORY   has a past medical history of Glaucoma (5/23/2012).    SOCIAL HISTORY  Social History     Tobacco Use   • Smoking status: Never Smoker   • Smokeless tobacco: Never Used   Substance and Sexual Activity   • Alcohol use: Not Currently     Comment: no ETOH x17 years   • Drug use: Never   • Sexual activity: Not on file       SURGICAL HISTORY  patient denies any surgical history    CURRENT MEDICATIONS  Home Medications     Reviewed by Mariposa Dalton (Pharmacy Tech) on 02/03/20 at 2000  Med List Status: Complete   Medication Last Dose Status   atorvastatin (LIPITOR) 40 MG Tab 2/3/2020 Active   metFORMIN (GLUCOPHAGE) 500 MG Tab 2/3/2020 Active   naproxen (NAPROSYN) 500 MG Tab 2/3/2020 Active   therapeutic multivitamin-minerals (THERAGRAN-M) Tab 2/3/2020 Active                ALLERGIES  No Known  "Allergies    PHYSICAL EXAM  VITAL SIGNS: /103   Pulse (!) 107   Temp 36.2 °C (97.2 °F) (Temporal)   Resp 18   Ht 1.6 m (5' 3\")   Wt 79.6 kg (175 lb 7.8 oz)   SpO2 97%   BMI 31.09 kg/m²    Pulse ox interpretation: I interpret this pulse ox as normal.  Constitutional: Alert in no apparent distress.  Pleasant 54-year-old man.  HENT: Normocephalic, Atraumatic, Bilateral external ears normal. Nose normal.   Eyes: Pupils are equal. Conjunctiva normal, non-icteric.   Neck: Full range of motion of neck with no limitations.  Heart: Increased rate and regular rythm, no murmurs.    Lungs: No respiratory distress, regular respirations. Clear to auscultation bilaterally.  Abdomen: Normal appearance, nondistended, nontender.  Skin: Warm, Dry, See musculoskeletal exam  Neurologic: Alert, Grossly non-focal. No slurred speech. Moving extremities normally.  See musculoskeletal exam.  MSK: At right great toe there is significant amount of edema with small blisters.  No hemorrhagic blisters.  There is extending erythema up the foot into the adjacent toes.  Pain with flexion extension of foot.  There is no calf tenderness.  There is diminished sensation at the great toe.  Psychiatric: Affect normal, Judgment normal, Mood normal, Appears appropriate and not intoxicated.   Physical Exam      COURSE & MEDICAL DECISION MAKING  Pertinent Labs & Imaging studies reviewed. (See chart for details)    5:40 PM This is an emergent evaluation of a 54 y.o., male who presents with swelling at the right great toe, redness, blister that is not hemorrhagic.  Erythema extending up foot.  Highly suspicion for diabetic foot infection.  Possible osteomyelitis.  Anticipate he will need to be admitted.  He will receive antibiotics Unasyn and vancomycin.  Septic lab work will be ordered include CBC, CMP, CRP, ESR, blood cultures, lactic acid.  X-ray will be done.    7:45 PM  X-ray reveals findings concerning for osteomyelitis.  He has a white count " of 13.2.  Elevated ESR to 93.  Lactic acid is normal.  I have paged [facility] internal medicine for hospitalization. They plan to write orders.  [facility] team will consult and preservation service.  He may need operative intervention for this infection.      CRITICAL CARE  The very real possibilty of a deterioration of this patient's condition required the highest level of my preparedness for sudden, emergent intervention.  I provided critical care services, which included medication orders, frequent reevaluations of the patient's condition and response to treatment, ordering and reviewing test results, and discussing the case with various consultants.  The critical care time associated with the care of the patient was 30 minutes. Review chart for interventions. This time is exclusive of any other billable procedures.      FINAL IMPRESSION  1. Sepsis, due to unspecified organism, unspecified whether acute organ dysfunction present (HCC)    2. Diabetic foot infection (HCC)    3. Osteomyelitis of right foot, unspecified type (HCC)               Electronically signed by: Heath Pereyra II, M.D., 2/3/2020 5:40 PM

## 2020-02-04 NOTE — CARE PLAN
Problem: Communication  Goal: The ability to communicate needs accurately and effectively will improve  Outcome: PROGRESSING AS EXPECTED  Note:   White board updated; call light within patient reach; hourly rounding in progress.     Problem: Safety  Goal: Will remain free from injury  Outcome: PROGRESSING AS EXPECTED  Note:   Pt educated on safety protocols; encouraged to call for assistance.

## 2020-02-04 NOTE — NON-PROVIDER
"Daily Progress Note:     Date of Service: 2/4/2020  Primary Team: Jason  Attending: No att. providers found   Senior Resident: Dr. Rodriguez   Intern: Dr. Patton  Contact:  [phone]    Chief Complaint: R toe diabetic foot infection    ID: 53 YO male with a PMH of DM T2 controlled by Metformin was admitted for 1 day hx of severe pain and swelling at the first digit of the R foot.   He initially injured his R foot first digit on Jan 20th and came to the ED due to pain and swelling. There were no fractures found. Yesterday AM, pt reported noticing \"blisters\" and progressively worsening pain in the same toe, which brought him to the hospital.  He denied any fever, chills, or generalized malaise.     Subjective:  Gen: Pt was seen this morning while lying down. He complains of no major events overnight and is not in any acute distress. He denies any fever, chills, headaches, dizziness.     Skin: Regarding his R toe pain, pt explains that he has seen less swelling and experienced less pain than the day before. He states that right now his pain is a 5/10 in severity and characterizes the pain as throbbing. There is significant edema in his R foot. Low suspicion of vascular compromise as doppler ultrasound showed normal blood flow.     Heme: Pt has elevated WBC 12.8, which is down from 13.2 yesterday. Leukocytosis may be elevated due to soft tissue infection. Elevated alk phos may be due to bone injury.     Diabetes:   Blood glucose is elevated at 205. Typically his DM has been controlled by Metformin at home, but currently he is being given insulin. Pt states that his blood sugars varied at home between 120-170 depending on when he measured them. He states that he has improved his blood sugars with his A1C dropping from 7 to 6 from Oct to last week at his last PCP.     Disposition: Pt will be transferred to family medicine service.    Consultants/Specialty:  Limb preservation service, orthopedics, wound " culture    Medications    Current Facility-Administered Medications:   •  vancomycin (VANCOCIN) 1,400 mg in  mL IVPB, 17 mg/kg, Intravenous  •  senna-docusate (PERICOLACE or SENOKOT S) 8.6-50 MG per tablet 2 Tab, 2 Tab, Oral, BID, Stopped at 02/04/20 0600 **AND** polyethylene glycol/lytes (MIRALAX) PACKET 1 Packet, 1 Packet, Oral, QDAY PRN **AND** magnesium hydroxide (MILK OF MAGNESIA) suspension 30 mL, 30 mL, Oral, QDAY PRN **AND** bisacodyl (DULCOLAX) suppository 10 mg, 10 mg, Rectal, QDAY PRN  •  NS infusion Intravenous, Continuous, Last Rate: 125 mL/hr   •  acetaminophen (TYLENOL) tablet 650 mg, 650 mg, Oral, Q6HRS PRN  •  insulin regular (HUMULIN R) injection 1-6 Units, 1-6 Units, Subcutaneous, Accu-Chek ACHS  •  ampicillin/sulbactam (UNASYN) 3 g in  mL IVPB, 3 g, Intravenous, Q6HRS  •  atorvastatin (LIPITOR) tablet 40 mg, 40 mg, Oral, DAILY  •  therapeutic multivitamin-minerals   •  HYDROcodone/acetaminophen (NORCO)   •  SODIUM CHLORIDE 0.9 % IV SOLN    Review of Systems:    Review of Systems   Constitutional: Negative for chills, diaphoresis, fever and malaise/fatigue.   HENT: Negative for congestion, hearing loss, sore throat and tinnitus.    Respiratory: Negative for cough, hemoptysis, sputum production and shortness of breath.    Cardiovascular: Negative for chest pain, palpitations and leg swelling.   Gastrointestinal: Negative for abdominal pain, constipation, diarrhea, nausea and vomiting.   Genitourinary: Negative for dysuria.   Musculoskeletal: Negative for myalgias.   Skin: Negative for itching and rash.   Neurological: Negative for dizziness, tingling, weakness and headaches.   Endo/Heme/Allergies: Does not bruise/bleed easily.   Psychiatric/Behavioral: Negative.         Objective Data:   Physical Exam:   Vitals:   Temp:  [36.2 °C (97.2 °F)-37.4 °C (99.4 °F)] 37.4 °C (99.4 °F)  Pulse:  [] 95  Resp:  [14-18] 14  BP: (144-156)/() 150/88  SpO2:  [96 %-98 %] 96 %         Gen:  Pt  was examined lying down in NAD.     HEENT:   Normocephalic. Atraumatic. Pupils equal and reactive to light bilaterally. Throat is moist. Neck is supple. No masses or lymphadenopathy. Large neck circumference, concerning for JOSE ELIAS.     CV:   Regular rate and rhythm. S1 and S2 appreciated. No murmurs, rubs, gallops. Radial pulses palpable 2+ bilaterally. Pedal pulses palpable 2+ bilaterally. U/S Doppler, biphasic flow    Pulm:   Normal respiratory effort. No respiratory distress. Lungs were clear to ascultation bilaterally.     Abd:   Soft, nontender to palpation. No masses, rebound tenderness, distension. Guaiac test negative.     : Not examined    Skin:   R foot, first digit - red, erythematous, purulent, warm, tender. Partial ulceration localized to the R toe. Redness and tenderness extends to the R foot.       Imaging:   3 View XR 2/2/20:   IMPRESSION:     Mildly increased soft tissue swelling.  Periarticular erosion most conspicuous about the great toe proximal phalanx which could represent osteomyelitis end your septic arthritis. MRI without and with contrast is recommended for further evaluation.     MRI pending     A/P:    This is a 53 YO M with a PMH of T2DM presenting with a 1 day hx of R toe cellulitis that has been progressively worsening. PE was significant for redness, pain, tenderness, warmth, and purulence at the first digit of the R foot. WBC 12.8. These findings are consistent with cellulitis. 3 view suggested possible osteomyelitis. MRI is now pending as it is the most sensitive test for OM evaluation. Blood cultures were negative. History, labs, PE are less concerning for septic arthritis or autoimmune causes like pyoderma gangrenosum.      Plan:  1. Continue with Vancomycin and ampicillin-sulbactam for broad spectrum and MRSA coverage. MRSA causes up to 75% of soft tissue infections, and purulence further suggests an infection of Staph.  2. Order next day CBC w/diff, blood glucose, CMP to follow  pt for signs of systemic infection   3. Progress to clear liquid diet - consult with ortho and limb preservation service pending  4.Pain control with tylenol and hydrocodone  5. Hold metformin and start insulin sliding scale to prevent FELIX, ortho surgical consult pending.   6. Educate pt to have outpatient follow up with PCP for DM, referral for sleep study and JOSE ELIAS evaluation

## 2020-02-04 NOTE — CARE PLAN
Problem: Communication  Goal: The ability to communicate needs accurately and effectively will improve  Outcome: PROGRESSING AS EXPECTED    Call light within reach,  Patient able to effectively communicate needs.    Problem: Venous Thromboembolism (VTW)/Deep Vein Thrombosis (DVT) Prevention:  Goal: Patient will participate in Venous Thrombosis (VTE)/Deep Vein Thrombosis (DVT)Prevention Measures  Outcome: PROGRESSING AS EXPECTED    Patient able to ambulate and SCD's ordered and on.

## 2020-02-04 NOTE — ASSESSMENT & PLAN NOTE
Hx of DM, last A1c 1/20- 6.9, metformin 500 mg BID at home  -Patient without significant neuropathy noted on exam, states he has had mild numbness/tingling in the past    Plan:  -Patient had MRI, no sepsis, likely stable to resume metformin when diet restarted  -Currently on 2U insulin with meals, but NPO  -Hypoglycemia Protocol

## 2020-02-04 NOTE — H&P
History & Physical Note    Date of Admission: 2/4/2020  Admission Status: Inpatient  [facility] Team: [facility]  Attending: Dr. Dai Peck II  Senior Resident: Dr. Rodriguez  Intern: Dr. Patton  Contact Number: [phone]    Chief Complaint: right first toe pain     History of Present Illness (HPI):   Patient is a 54-year-old male with a history of diabetes presenting to the ED with a 2-week history of right toe swelling.  Was previously seen in the hospital emergency department 2 late January for the same reason.  States that was discharged with suggestion for PCP follow-up after imaging ruled out fracture.  States that swelling has become worse progressively, especially today.  Affected toe and dorsal foot is erythematous, hot, tender.  Ran out of pain medications he states.  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  Patient unsure of precipitating event. Has been applying Arnica for alleviation of symptoms but minimal relief.  No prior episodes..Denies fever, chills, nausea, vomiting.  Last tetanus vaccination 12 years ago.    ED course:  Vital signs: 107 pulse, 97.2 temp, 144/103 b/p, 97% 02  Abnormal labs: as below in table, wbc 13.2 with 78.10 neutrophils, crp 24.10, esr 93, glucose 245  Imaging: XR foot complete    Abnormal Labs Reviewed   CBC WITH DIFFERENTIAL - Abnormal; Notable for the following components:       Result Value    WBC 13.2 (*)     Hematocrit 41.6 (*)     Neutrophils-Polys 78.10 (*)     Lymphocytes 9.30 (*)     Immature Granulocytes 2.00 (*)     Neutrophils (Absolute) 10.30 (*)     Monos (Absolute) 1.20 (*)     Immature Granulocytes (abs) 0.26 (*)     All other components within normal limits   COMP METABOLIC PANEL - Abnormal; Notable for the following components:    Sodium 132 (*)     Anion Gap 13.0 (*)     Glucose 245 (*)     Alkaline Phosphatase 218 (*)     Total Protein 8.4 (*)      Globulin 4.3 (*)     All other components within normal limits   CRP QUANTITIVE (NON-CARDIAC) - Abnormal; Notable for the following components:    Stat C-Reactive Protein 24.10 (*)     All other components within normal limits   SED RATE - Abnormal; Notable for the following components:    Sed Rate Westergren 93 (*)     All other components within normal limits   ACCU-CHEK GLUCOSE - Abnormal; Notable for the following components:    Glucose - Accu-Ck 219 (*)     All other components within normal limits         Review of Systems:   Review of Systems   Constitutional: Negative for chills and fever.   HENT: Negative for ear pain and tinnitus.    Eyes: Negative for double vision and photophobia.   Respiratory: Negative for hemoptysis and sputum production.    Cardiovascular: Negative for chest pain and palpitations.   Gastrointestinal: Negative for abdominal pain and nausea.   Genitourinary: Negative for frequency and urgency.   Musculoskeletal: Negative for back pain and neck pain.   Skin: Negative for itching and rash.        Right first toe swollen, erythematous, tender   Neurological: Negative for tingling and tremors.   Endo/Heme/Allergies: Negative for environmental allergies. Does not bruise/bleed easily.   Psychiatric/Behavioral: Negative for substance abuse and suicidal ideas.       Past Medical History:    has a past medical history of Glaucoma (5/23/2012).    Past Surgical History: none    Medications:   Prior to Admission Medications   Prescriptions Last Dose Informant Patient Reported? Taking?   atorvastatin (LIPITOR) 40 MG Tab 2/3/2020 at 0900 Patient Yes No   Sig: Take 40 mg by mouth every day.   metFORMIN (GLUCOPHAGE) 500 MG Tab 2/3/2020 at 0900 Patient Yes No   Sig: Take 500 mg by mouth 2 Times a Day.   naproxen (NAPROSYN) 500 MG Tab 2/3/2020 at 0900 Patient Yes Yes   Sig: Take 500 mg by mouth 2 times a day, with meals.   therapeutic multivitamin-minerals (THERAGRAN-M) Tab 2/3/2020 at 0900 Patient Yes  Yes   Sig: Take 1 Tab by mouth every day.      Facility-Administered Medications: None        Allergies: No Known Allergies    Family History:   DM    Social History:   Tobacco: no   Alcohol: no   Recreational drugs (illegal and prescription):  no   Employment: light maintenance repair  Activity Level: active     Primary Care Provider:  Galilea Reyes M.D.    Physical Exam:  Vitals:  Temp:  [36.2 °C (97.2 °F)-37.4 °C (99.4 °F)] 37.4 °C (99.4 °F)  Pulse:  [] 95  Resp:  [14-18] 14  BP: (144-156)/() 150/88  SpO2:  [96 %-98 %] 96 %    Physical Exam  Constitutional:       Appearance: Normal appearance.   HENT:      Head: Normocephalic and atraumatic.      Right Ear: Tympanic membrane normal.      Left Ear: Tympanic membrane normal.      Nose: Nose normal.      Mouth/Throat:      Mouth: Mucous membranes are moist.      Pharynx: Oropharynx is clear.   Eyes:      Extraocular Movements: Extraocular movements intact.      Conjunctiva/sclera: Conjunctivae normal.      Pupils: Pupils are equal, round, and reactive to light.   Neck:      Musculoskeletal: No neck rigidity or muscular tenderness.   Cardiovascular:      Rate and Rhythm: Tachycardia present.      Heart sounds: No murmur. No gallop.    Pulmonary:      Effort: No respiratory distress.      Breath sounds: No stridor. No wheezing.   Abdominal:      General: There is no distension.      Tenderness: There is no tenderness. There is no rebound.   Musculoskeletal:         General: No deformity or signs of injury.   Skin:     Coloration: Skin is not jaundiced or pale.      Comments: Right first toe and dorsal foot significant swelling, no pus on squeeze, erythematous   Neurological:      Mental Status: He is alert.      Motor: No weakness.      Coordination: Coordination normal.         Labs:   Results for orders placed or performed during the hospital encounter of 02/03/20   CBC WITH DIFFERENTIAL   Result Value Ref Range    WBC 13.2 (H) 4.8 - 10.8 K/uL    RBC  4.99 4.70 - 6.10 M/uL    Hemoglobin 14.0 14.0 - 18.0 g/dL    Hematocrit 41.6 (L) 42.0 - 52.0 %    MCV 83.4 81.4 - 97.8 fL    MCH 28.1 27.0 - 33.0 pg    MCHC 33.7 33.7 - 35.3 g/dL    RDW 37.8 35.9 - 50.0 fL    Platelet Count 361 164 - 446 K/uL    MPV 9.2 9.0 - 12.9 fL    Neutrophils-Polys 78.10 (H) 44.00 - 72.00 %    Lymphocytes 9.30 (L) 22.00 - 41.00 %    Monocytes 9.10 0.00 - 13.40 %    Eosinophils 0.70 0.00 - 6.90 %    Basophils 0.80 0.00 - 1.80 %    Immature Granulocytes 2.00 (H) 0.00 - 0.90 %    Nucleated RBC 0.00 /100 WBC    Neutrophils (Absolute) 10.30 (H) 1.82 - 7.42 K/uL    Lymphs (Absolute) 1.22 1.00 - 4.80 K/uL    Monos (Absolute) 1.20 (H) 0.00 - 0.85 K/uL    Eos (Absolute) 0.09 0.00 - 0.51 K/uL    Baso (Absolute) 0.10 0.00 - 0.12 K/uL    Immature Granulocytes (abs) 0.26 (H) 0.00 - 0.11 K/uL    NRBC (Absolute) 0.00 K/uL   COMP METABOLIC PANEL   Result Value Ref Range    Sodium 132 (L) 135 - 145 mmol/L    Potassium 4.1 3.6 - 5.5 mmol/L    Chloride 97 96 - 112 mmol/L    Co2 22 20 - 33 mmol/L    Anion Gap 13.0 (H) 0.0 - 11.9    Glucose 245 (H) 65 - 99 mg/dL    Bun 11 8 - 22 mg/dL    Creatinine 0.78 0.50 - 1.40 mg/dL    Calcium 9.4 8.5 - 10.5 mg/dL    AST(SGOT) 22 12 - 45 U/L    ALT(SGPT) 50 2 - 50 U/L    Alkaline Phosphatase 218 (H) 30 - 99 U/L    Total Bilirubin 0.9 0.1 - 1.5 mg/dL    Albumin 4.1 3.2 - 4.9 g/dL    Total Protein 8.4 (H) 6.0 - 8.2 g/dL    Globulin 4.3 (H) 1.9 - 3.5 g/dL    A-G Ratio 1.0 g/dL   LACTIC ACID   Result Value Ref Range    Lactic Acid 1.7 0.5 - 2.0 mmol/L   CRP QUANTITIVE (NON-CARDIAC)   Result Value Ref Range    Stat C-Reactive Protein 24.10 (H) 0.00 - 0.75 mg/dL   Sed Rate   Result Value Ref Range    Sed Rate Westergren 93 (H) 0 - 20 mm/hour   ESTIMATED GFR   Result Value Ref Range    GFR If African American >60 >60 mL/min/1.73 m 2    GFR If Non African American >60 >60 mL/min/1.73 m 2   ACCU-CHEK GLUCOSE   Result Value Ref Range    Glucose - Accu-Ck 219 (H) 65 - 99 mg/dL         EKG: No results found for this or any previous visit.     Imaging:   DX-FOOT-COMPLETE 3+ RIGHT   Final Result      Mildly increased soft tissue swelling.      Periarticular erosion most conspicuous about the great toe proximal phalanx which could represent osteomyelitis end your septic arthritis. MRI without and with contrast is recommended for further evaluation.      MR-FOOT-WITH & W/O RIGHT    (Results Pending)   US-RENAL ARTERY DUPLEX COMP    (Results Pending)        Previous Data Review: reviewed    Toe pain, right  Assessment & Plan  Patient is a 54-year-old male with a history of diabetes presenting to the ED with a 2-week history of right toe swelling.  Was previously seen in the hospital emergency department 2 late January for the same reason.  States that was discharged with suggestion for PCP follow-up after imaging ruled out fracture.  States that swelling has become worse progressively, especially today.  Affected toe and dorsal foot is erythematous, hot, tender.  Ran out of pain medications he states.  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  Patient unsure of precipitating event. Has been applying Arnica for alleviation of symptoms but minimal relief.  No prior episodes..Denies fever, chills, nausea, vomiting.  Last tetanus vaccination 12 years ago.    ED course:  Vital signs: 107 pulse, 97.2 temp, 144/103 b/p, 97% 02  Abnormal labs: as below in table, wbc 13.2 with 78.10 neutrophils, crp 24.10, esr 93, glucose 245  Imaging: XR foot complete consistent with potential osteomyelitis    Suspecting this to be DM ulcer of foot potentially from osteomyelitis or septic arthritis   Plan:  Abx coverage: ampicillin/sulbactam and vancomycin  Pain control: acetaminophen, hydrocodone/acetaminophen timed  Diet NPO midnight forward should there be surgical intervention  MRI: right foot, to follow  IP consult to limb  preservation service  On f/u with PCP to inquire about vaccination for tetanus  Follow blood cultures  Follow for wound culture (will need to request limb preservation service or wound care for culture)  Ordering uric acid, to follow  Vascular studies of right lower extremity (arterial doppler, bedside pulse doppler)  Ortho consult for I&D  (order PT/INR prior)      Hyperlipidemia  Assessment & Plan  Contine atorvasatin (home med) 40 mg       DM (diabetes mellitus) (HCC)  Assessment & Plan  Hx of DM, last Hba1c 1/2020 was 6.9    Plan:  Holding home metformin in the possibility of surgical intervention , should patient need contrast imaging  Insulin sliding scale  Hypoglycemia Protocol

## 2020-02-04 NOTE — ED TRIAGE NOTES
Chief Complaint   Patient presents with   • Foot Pain     R; recent visit for similar issue - worsening pain. Wearing fracture boots as educated prior.     Patient to triage via danilolechair, fracture méndez to R foot, patient A&O x4.  Patient denies n/t, pedal pulse 2+.    Explained wait time and triage process to pt. Pt placed back in lobby, told to notify ED tech or triage RN of any changes, verbalized understanding.

## 2020-02-04 NOTE — PROGRESS NOTES
2 RN skin check completed with Zoey RN  Diabetic ulcer noted on right great toe with swelling and erythema. Wound pictures taken and consult opened. Bruising noted on left elbow. Left heel, sacrum, bilateral ears otherwise intact.

## 2020-02-04 NOTE — SENIOR ADMIT NOTE
"Senior Admit Note:    Patient ID:   Rob Webster   MRN: [id number], : 1965 , Sex: M     C/O: Right Foot/Toe Pain     HPI:   A 55 yo man with PMHx of DM on metformin came in with worsening Right Foot/Toe pain . Pt had an rolled ankle on , came to ER on  & X ray normal at that time, d/c home with pain medications. Over the last 2 wks, the pain in his toes getting worse, associated with sweliing, redness, tenderness. Denied Feber, chills. Denied discharge or open wounds/ulcer.   HPI together with Dr Hedrick who is fluent in [place]ron[place].     At ER, /103   Pulse (!) 107   Temp 36.2 °C (97.2 °F) (Temporal)   Resp 18   Ht 1.6 m (5' 3\")   Wt 79.6 kg (175 lb 7.8 oz)   SpO2 97%   BMI 31.09 kg/m²    Labs showed elevated ESR 93 & CRP 24.   WBC 13.2.   X ray foot showed \"Mildly increased soft tissue swelling.  Periarticular erosion most conspicuous about the great toe proximal phalanx which could represent osteomyelitis or septic arthritis. MRI without and with contrast is recommended for further evaluation.\"    Blood Cx drawn at ER & got one dose of IV unasyn & vanco.       ROS:   +ve for right toe/foot pain  Negative otherwise.     Physical Exam:   Constitutional: Not in acute distress.  HEENT: NC AT, PERRLA . EOMI. No LAD.   Heart: Regular rate and rythm, no murmurs.    Lungs: No respiratory distress, regular respirations. CT BA.   Abdomen: Normal appearance, nondistended, nontender.  Skin: Warm, Dry, No erythema, No rash.   Neurologic: AAO x 3 , No focal deficits.   MSK:Right Foot: Erythematous , increased temperature , red in right 1st / 2nd / 3 rd toes. No open wounds or ulcers but can see pus collection under his skin.   Peripheral pulses 2+ symmetrical.   Psychiatric: Affect normal, Judgment normal, Mood normal.     Assessment & Plan:   # DM foot ulcer / Possible Osteomyelitis or septic arthritis  - Admit for IV antibiotics & pain control   - IV unasyn & vanco  - pain control with Norco Prn "   - hold home metformin ( pt will get contrast study & possible Sx in AM after limb preservation service evaluation )  - ISS with hypoglycemic protocol ( A1C 6.9 on 1/21 )  - IVF   - NPO at midnight   -  Limb preservation service evaluation in AM  - MRI right foot with & w/o contrast ordered ( no open wounds to prob the bone on exam )    DVT Px with SCD. Pharmacological DVT Px held due to possible Sx in AM.

## 2020-02-04 NOTE — ASSESSMENT & PLAN NOTE
-history of diabetes, 2-week history of right toe swelling after fall and hyperflexion, previous ED admission did not look infectious at that time and got worse over last 2 days with erythema, swelling and pus. Does not remember if there was a penetrating wound.  -Last tetanus vaccination 12 years ago.  -Elevated ESR/CRP, met SIRS criteria (HR, WBC) with possible sepsis source from big toe but rapidly improved with antibiotics and pain control  -This seems to be ulcerative wound in setting of diabetes with cellulitis, mild-moderate concern for osteomyelitis  -MRI pending    Plan:  -Abx coverage: ampicillin/sulbactam and vancomycin  -Pain control: acetaminophen, hydrocodone/acetaminophen 10 mg PRN  -NPO at midnight, clear liquids for now dpeending on limb preservation/ortho recommendations  -Holding lovenox if surgery/ I and D needed, continue SCD's for now  -limb preservation service  -f/u blood cultures  -Vascular studies of right lower extremity (arterial doppler, bedside pulse doppler) per limb preservation  -Transfer to FM service for further care

## 2020-02-04 NOTE — ED NOTES
Med rec updated and complete. Allergies reviewed. Met with pt at bedside.dicussed current medications. No antibiotic use in last 14 days.  Pt stated that he is on a narcotic for his pain.  Pt stated he filled it at [facility]. Pt unable to recall the name of the medication.     Narxcheck complete. No narcotics . Placed call to [facility]. No narcotics filled.    Home pharmacy [facility].

## 2020-02-04 NOTE — ED NOTES
Pt has large blister to top of big toe with draining pus.  Foot red and swollen.  Pt is DMII on metformin

## 2020-02-04 NOTE — ED NOTES
IV started per orders. Pt tolerated procedure well, resting comfortably at this time. Labs sent. Pt educated about plan of care.  PT given meds per mar.  Tolerated well, resting at this time. siderails up x2, call light within reach.

## 2020-02-05 ENCOUNTER — APPOINTMENT (OUTPATIENT)
Dept: CARDIOLOGY | Facility: MEDICAL CENTER | Age: 55
DRG: 854 | End: 2020-02-05
Attending: STUDENT IN AN ORGANIZED HEALTH CARE EDUCATION/TRAINING PROGRAM
Payer: COMMERCIAL

## 2020-02-05 ENCOUNTER — ANESTHESIA EVENT (OUTPATIENT)
Dept: SURGERY | Facility: MEDICAL CENTER | Age: 55
DRG: 854 | End: 2020-02-05
Payer: COMMERCIAL

## 2020-02-05 ENCOUNTER — ANESTHESIA (OUTPATIENT)
Dept: SURGERY | Facility: MEDICAL CENTER | Age: 55
DRG: 854 | End: 2020-02-05
Payer: COMMERCIAL

## 2020-02-05 LAB
ANION GAP SERPL CALC-SCNC: 9 MMOL/L (ref 0–11.9)
BASOPHILS # BLD AUTO: 0.5 % (ref 0–1.8)
BASOPHILS # BLD: 0.06 K/UL (ref 0–0.12)
BUN SERPL-MCNC: 8 MG/DL (ref 8–22)
CALCIUM SERPL-MCNC: 8.8 MG/DL (ref 8.5–10.5)
CHLORIDE SERPL-SCNC: 99 MMOL/L (ref 96–112)
CO2 SERPL-SCNC: 25 MMOL/L (ref 20–33)
CREAT SERPL-MCNC: 0.58 MG/DL (ref 0.5–1.4)
CRP SERPL HS-MCNC: 25.61 MG/DL (ref 0–0.75)
EOSINOPHIL # BLD AUTO: 0.16 K/UL (ref 0–0.51)
EOSINOPHIL NFR BLD: 1.3 % (ref 0–6.9)
ERYTHROCYTE [DISTWIDTH] IN BLOOD BY AUTOMATED COUNT: 37.6 FL (ref 35.9–50)
ERYTHROCYTE [SEDIMENTATION RATE] IN BLOOD BY WESTERGREN METHOD: 91 MM/HOUR (ref 0–20)
GLUCOSE BLD-MCNC: 164 MG/DL (ref 65–99)
GLUCOSE BLD-MCNC: 167 MG/DL (ref 65–99)
GLUCOSE BLD-MCNC: 170 MG/DL (ref 65–99)
GLUCOSE BLD-MCNC: 257 MG/DL (ref 65–99)
GLUCOSE SERPL-MCNC: 217 MG/DL (ref 65–99)
GRAM STN SPEC: NORMAL
GRAM STN SPEC: NORMAL
HCT VFR BLD AUTO: 36.7 % (ref 42–52)
HGB BLD-MCNC: 12.3 G/DL (ref 14–18)
IMM GRANULOCYTES # BLD AUTO: 0.27 K/UL (ref 0–0.11)
IMM GRANULOCYTES NFR BLD AUTO: 2.2 % (ref 0–0.9)
LYMPHOCYTES # BLD AUTO: 1.25 K/UL (ref 1–4.8)
LYMPHOCYTES NFR BLD: 10 % (ref 22–41)
MAGNESIUM SERPL-MCNC: 2.2 MG/DL (ref 1.5–2.5)
MCH RBC QN AUTO: 27.8 PG (ref 27–33)
MCHC RBC AUTO-ENTMCNC: 33.5 G/DL (ref 33.7–35.3)
MCV RBC AUTO: 83 FL (ref 81.4–97.8)
MONOCYTES # BLD AUTO: 1.37 K/UL (ref 0–0.85)
MONOCYTES NFR BLD AUTO: 10.9 % (ref 0–13.4)
NEUTROPHILS # BLD AUTO: 9.41 K/UL (ref 1.82–7.42)
NEUTROPHILS NFR BLD: 75.1 % (ref 44–72)
NRBC # BLD AUTO: 0 K/UL
NRBC BLD-RTO: 0 /100 WBC
PLATELET # BLD AUTO: 348 K/UL (ref 164–446)
PMV BLD AUTO: 9.1 FL (ref 9–12.9)
POTASSIUM SERPL-SCNC: 3.6 MMOL/L (ref 3.6–5.5)
RBC # BLD AUTO: 4.42 M/UL (ref 4.7–6.1)
SIGNIFICANT IND 70042: NORMAL
SIGNIFICANT IND 70042: NORMAL
SITE SITE: NORMAL
SITE SITE: NORMAL
SODIUM SERPL-SCNC: 133 MMOL/L (ref 135–145)
SOURCE SOURCE: NORMAL
SOURCE SOURCE: NORMAL
VANCOMYCIN TROUGH SERPL-MCNC: 8.8 UG/ML (ref 10–20)
WBC # BLD AUTO: 12.5 K/UL (ref 4.8–10.8)

## 2020-02-05 PROCEDURE — 87075 CULTR BACTERIA EXCEPT BLOOD: CPT

## 2020-02-05 PROCEDURE — 87040 BLOOD CULTURE FOR BACTERIA: CPT | Mod: 91

## 2020-02-05 PROCEDURE — 700111 HCHG RX REV CODE 636 W/ 250 OVERRIDE (IP): Performed by: STUDENT IN AN ORGANIZED HEALTH CARE EDUCATION/TRAINING PROGRAM

## 2020-02-05 PROCEDURE — 160002 HCHG RECOVERY MINUTES (STAT)

## 2020-02-05 PROCEDURE — 160048 HCHG OR STATISTICAL LEVEL 1-5

## 2020-02-05 PROCEDURE — 99223 1ST HOSP IP/OBS HIGH 75: CPT | Mod: GC | Performed by: INTERNAL MEDICINE

## 2020-02-05 PROCEDURE — 700102 HCHG RX REV CODE 250 W/ 637 OVERRIDE(OP): Performed by: INTERNAL MEDICINE

## 2020-02-05 PROCEDURE — 87070 CULTURE OTHR SPECIMN AEROBIC: CPT | Mod: 91

## 2020-02-05 PROCEDURE — 700101 HCHG RX REV CODE 250: Performed by: ANESTHESIOLOGY

## 2020-02-05 PROCEDURE — 87205 SMEAR GRAM STAIN: CPT | Mod: 91

## 2020-02-05 PROCEDURE — 700105 HCHG RX REV CODE 258: Performed by: INTERNAL MEDICINE

## 2020-02-05 PROCEDURE — 87186 SC STD MICRODIL/AGAR DIL: CPT

## 2020-02-05 PROCEDURE — 87147 CULTURE TYPE IMMUNOLOGIC: CPT

## 2020-02-05 PROCEDURE — 85652 RBC SED RATE AUTOMATED: CPT

## 2020-02-05 PROCEDURE — 0QBN0ZX EXCISION OF RIGHT METATARSAL, OPEN APPROACH, DIAGNOSTIC: ICD-10-PCS | Performed by: ORTHOPAEDIC SURGERY

## 2020-02-05 PROCEDURE — 36415 COLL VENOUS BLD VENIPUNCTURE: CPT

## 2020-02-05 PROCEDURE — 88311 DECALCIFY TISSUE: CPT

## 2020-02-05 PROCEDURE — 0Y6M0Z4 DETACHMENT AT RIGHT FOOT, COMPLETE 1ST RAY, OPEN APPROACH: ICD-10-PCS | Performed by: ORTHOPAEDIC SURGERY

## 2020-02-05 PROCEDURE — 770006 HCHG ROOM/CARE - MED/SURG/GYN SEMI*

## 2020-02-05 PROCEDURE — A9270 NON-COVERED ITEM OR SERVICE: HCPCS | Performed by: STUDENT IN AN ORGANIZED HEALTH CARE EDUCATION/TRAINING PROGRAM

## 2020-02-05 PROCEDURE — 160009 HCHG ANES TIME/MIN

## 2020-02-05 PROCEDURE — 82962 GLUCOSE BLOOD TEST: CPT | Mod: 91

## 2020-02-05 PROCEDURE — 85025 COMPLETE CBC W/AUTO DIFF WBC: CPT

## 2020-02-05 PROCEDURE — 80202 ASSAY OF VANCOMYCIN: CPT

## 2020-02-05 PROCEDURE — 86140 C-REACTIVE PROTEIN: CPT

## 2020-02-05 PROCEDURE — 160035 HCHG PACU - 1ST 60 MINS PHASE I

## 2020-02-05 PROCEDURE — 88305 TISSUE EXAM BY PATHOLOGIST: CPT

## 2020-02-05 PROCEDURE — 500881 HCHG PACK, EXTREMITY

## 2020-02-05 PROCEDURE — 88304 TISSUE EXAM BY PATHOLOGIST: CPT

## 2020-02-05 PROCEDURE — 160028 HCHG SURGERY MINUTES - 1ST 30 MINS LEVEL 3

## 2020-02-05 PROCEDURE — 83735 ASSAY OF MAGNESIUM: CPT

## 2020-02-05 PROCEDURE — 700101 HCHG RX REV CODE 250: Performed by: ORTHOPAEDIC SURGERY

## 2020-02-05 PROCEDURE — 700111 HCHG RX REV CODE 636 W/ 250 OVERRIDE (IP): Performed by: ANESTHESIOLOGY

## 2020-02-05 PROCEDURE — 700102 HCHG RX REV CODE 250 W/ 637 OVERRIDE(OP): Performed by: STUDENT IN AN ORGANIZED HEALTH CARE EDUCATION/TRAINING PROGRAM

## 2020-02-05 PROCEDURE — 160039 HCHG SURGERY MINUTES - EA ADDL 1 MIN LEVEL 3

## 2020-02-05 PROCEDURE — 80048 BASIC METABOLIC PNL TOTAL CA: CPT

## 2020-02-05 PROCEDURE — A6550 NEG PRES WOUND THER DRSG SET: HCPCS

## 2020-02-05 PROCEDURE — 87077 CULTURE AEROBIC IDENTIFY: CPT

## 2020-02-05 PROCEDURE — 501838 HCHG SUTURE GENERAL

## 2020-02-05 PROCEDURE — 88307 TISSUE EXAM BY PATHOLOGIST: CPT

## 2020-02-05 PROCEDURE — 501488 HCHG SUCTION CANN, WOUNDVAC TRAC

## 2020-02-05 PROCEDURE — 700105 HCHG RX REV CODE 258: Performed by: STUDENT IN AN ORGANIZED HEALTH CARE EDUCATION/TRAINING PROGRAM

## 2020-02-05 PROCEDURE — 700105 HCHG RX REV CODE 258: Performed by: ANESTHESIOLOGY

## 2020-02-05 RX ORDER — CEFAZOLIN SODIUM 2 G/100ML
2 INJECTION, SOLUTION INTRAVENOUS EVERY 8 HOURS
Status: DISCONTINUED | OUTPATIENT
Start: 2020-02-05 | End: 2020-02-14 | Stop reason: HOSPADM

## 2020-02-05 RX ORDER — ONDANSETRON 2 MG/ML
INJECTION INTRAMUSCULAR; INTRAVENOUS PRN
Status: DISCONTINUED | OUTPATIENT
Start: 2020-02-05 | End: 2020-02-05 | Stop reason: SURG

## 2020-02-05 RX ORDER — MAGNESIUM HYDROXIDE 1200 MG/15ML
LIQUID ORAL
Status: COMPLETED | OUTPATIENT
Start: 2020-02-05 | End: 2020-02-05

## 2020-02-05 RX ORDER — ONDANSETRON 2 MG/ML
4 INJECTION INTRAMUSCULAR; INTRAVENOUS
Status: DISCONTINUED | OUTPATIENT
Start: 2020-02-05 | End: 2020-02-05 | Stop reason: HOSPADM

## 2020-02-05 RX ORDER — OXYCODONE HCL 5 MG/5 ML
10 SOLUTION, ORAL ORAL
Status: DISCONTINUED | OUTPATIENT
Start: 2020-02-05 | End: 2020-02-05 | Stop reason: HOSPADM

## 2020-02-05 RX ORDER — HALOPERIDOL 5 MG/ML
1 INJECTION INTRAMUSCULAR
Status: DISCONTINUED | OUTPATIENT
Start: 2020-02-05 | End: 2020-02-05 | Stop reason: HOSPADM

## 2020-02-05 RX ORDER — HYDROMORPHONE HYDROCHLORIDE 1 MG/ML
0.2 INJECTION, SOLUTION INTRAMUSCULAR; INTRAVENOUS; SUBCUTANEOUS
Status: DISCONTINUED | OUTPATIENT
Start: 2020-02-05 | End: 2020-02-05 | Stop reason: HOSPADM

## 2020-02-05 RX ORDER — DIPHENHYDRAMINE HYDROCHLORIDE 50 MG/ML
6.25 INJECTION INTRAMUSCULAR; INTRAVENOUS
Status: DISCONTINUED | OUTPATIENT
Start: 2020-02-05 | End: 2020-02-05 | Stop reason: HOSPADM

## 2020-02-05 RX ORDER — HYDRALAZINE HYDROCHLORIDE 20 MG/ML
5 INJECTION INTRAMUSCULAR; INTRAVENOUS
Status: DISCONTINUED | OUTPATIENT
Start: 2020-02-05 | End: 2020-02-05 | Stop reason: HOSPADM

## 2020-02-05 RX ORDER — MEPERIDINE HYDROCHLORIDE 25 MG/ML
12.5 INJECTION INTRAMUSCULAR; INTRAVENOUS; SUBCUTANEOUS
Status: DISCONTINUED | OUTPATIENT
Start: 2020-02-05 | End: 2020-02-05 | Stop reason: HOSPADM

## 2020-02-05 RX ORDER — SODIUM CHLORIDE, SODIUM LACTATE, POTASSIUM CHLORIDE, CALCIUM CHLORIDE 600; 310; 30; 20 MG/100ML; MG/100ML; MG/100ML; MG/100ML
INJECTION, SOLUTION INTRAVENOUS
Status: DISCONTINUED | OUTPATIENT
Start: 2020-02-05 | End: 2020-02-05 | Stop reason: SURG

## 2020-02-05 RX ORDER — HYDROMORPHONE HYDROCHLORIDE 1 MG/ML
0.1 INJECTION, SOLUTION INTRAMUSCULAR; INTRAVENOUS; SUBCUTANEOUS
Status: DISCONTINUED | OUTPATIENT
Start: 2020-02-05 | End: 2020-02-05 | Stop reason: HOSPADM

## 2020-02-05 RX ORDER — BUPIVACAINE HYDROCHLORIDE 5 MG/ML
INJECTION, SOLUTION EPIDURAL; INTRACAUDAL
Status: DISCONTINUED | OUTPATIENT
Start: 2020-02-05 | End: 2020-02-05 | Stop reason: HOSPADM

## 2020-02-05 RX ORDER — HYDROMORPHONE HYDROCHLORIDE 1 MG/ML
0.4 INJECTION, SOLUTION INTRAMUSCULAR; INTRAVENOUS; SUBCUTANEOUS
Status: DISCONTINUED | OUTPATIENT
Start: 2020-02-05 | End: 2020-02-05 | Stop reason: HOSPADM

## 2020-02-05 RX ORDER — SODIUM CHLORIDE, SODIUM LACTATE, POTASSIUM CHLORIDE, CALCIUM CHLORIDE 600; 310; 30; 20 MG/100ML; MG/100ML; MG/100ML; MG/100ML
INJECTION, SOLUTION INTRAVENOUS CONTINUOUS
Status: DISCONTINUED | OUTPATIENT
Start: 2020-02-05 | End: 2020-02-05 | Stop reason: HOSPADM

## 2020-02-05 RX ORDER — LIDOCAINE HYDROCHLORIDE 20 MG/ML
INJECTION, SOLUTION EPIDURAL; INFILTRATION; INTRACAUDAL; PERINEURAL PRN
Status: DISCONTINUED | OUTPATIENT
Start: 2020-02-05 | End: 2020-02-05 | Stop reason: SURG

## 2020-02-05 RX ORDER — OXYCODONE HCL 5 MG/5 ML
5 SOLUTION, ORAL ORAL
Status: DISCONTINUED | OUTPATIENT
Start: 2020-02-05 | End: 2020-02-05 | Stop reason: HOSPADM

## 2020-02-05 RX ADMIN — LIDOCAINE HYDROCHLORIDE 60 MG: 20 INJECTION, SOLUTION EPIDURAL; INFILTRATION; INTRACAUDAL at 15:41

## 2020-02-05 RX ADMIN — ONDANSETRON 4 MG: 2 INJECTION INTRAMUSCULAR; INTRAVENOUS at 16:24

## 2020-02-05 RX ADMIN — INSULIN GLARGINE 10 UNITS: 100 INJECTION, SOLUTION SUBCUTANEOUS at 18:02

## 2020-02-05 RX ADMIN — SODIUM CHLORIDE: 9 INJECTION, SOLUTION INTRAVENOUS at 11:29

## 2020-02-05 RX ADMIN — SENNOSIDES AND DOCUSATE SODIUM 2 TABLET: 8.6; 5 TABLET ORAL at 18:02

## 2020-02-05 RX ADMIN — CEFAZOLIN SODIUM 2 G: 2 INJECTION, SOLUTION INTRAVENOUS at 11:28

## 2020-02-05 RX ADMIN — EPHEDRINE SULFATE 10 MG: 50 INJECTION, SOLUTION INTRAVENOUS at 16:25

## 2020-02-05 RX ADMIN — HYDROCODONE BITARTRATE AND ACETAMINOPHEN 1 TABLET: 10; 325 TABLET ORAL at 19:31

## 2020-02-05 RX ADMIN — VANCOMYCIN HYDROCHLORIDE 1400 MG: 500 INJECTION, POWDER, LYOPHILIZED, FOR SOLUTION INTRAVENOUS at 06:30

## 2020-02-05 RX ADMIN — FENTANYL CITRATE 50 MCG: 50 INJECTION, SOLUTION INTRAMUSCULAR; INTRAVENOUS at 16:01

## 2020-02-05 RX ADMIN — ACETAMINOPHEN 650 MG: 325 TABLET, FILM COATED ORAL at 20:38

## 2020-02-05 RX ADMIN — SODIUM CHLORIDE, POTASSIUM CHLORIDE, SODIUM LACTATE AND CALCIUM CHLORIDE: 600; 310; 30; 20 INJECTION, SOLUTION INTRAVENOUS at 15:38

## 2020-02-05 RX ADMIN — SODIUM CHLORIDE, POTASSIUM CHLORIDE, SODIUM LACTATE AND CALCIUM CHLORIDE: 600; 310; 30; 20 INJECTION, SOLUTION INTRAVENOUS at 16:24

## 2020-02-05 RX ADMIN — FENTANYL CITRATE 50 MCG: 50 INJECTION, SOLUTION INTRAMUSCULAR; INTRAVENOUS at 15:41

## 2020-02-05 RX ADMIN — AMPICILLIN SODIUM AND SULBACTAM SODIUM 3 G: 2; 1 INJECTION, POWDER, FOR SOLUTION INTRAMUSCULAR; INTRAVENOUS at 05:24

## 2020-02-05 RX ADMIN — PROPOFOL 180 MG: 10 INJECTION, EMULSION INTRAVENOUS at 15:41

## 2020-02-05 RX ADMIN — FENTANYL CITRATE 50 MCG: 50 INJECTION, SOLUTION INTRAMUSCULAR; INTRAVENOUS at 16:14

## 2020-02-05 RX ADMIN — CEFAZOLIN SODIUM 2 G: 2 INJECTION, SOLUTION INTRAVENOUS at 20:38

## 2020-02-05 ASSESSMENT — PAIN SCALES - WONG BAKER: WONGBAKER_NUMERICALRESPONSE: DOESN'T HURT AT ALL

## 2020-02-05 NOTE — WOUND TEAM
Patient seen per LPS.  Pending further imaging and plan for surgery.  Please re-consult wound care post surgery with any needs.

## 2020-02-05 NOTE — CARE PLAN
Problem: Infection  Goal: Will remain free from infection  Outcome: PROGRESSING AS EXPECTED  Note:   Pt is receiving scheduled antibiotic regimen.      Problem: Bowel/Gastric:  Goal: Normal bowel function is maintained or improved  Outcome: PROGRESSING SLOWER THAN EXPECTED  Note:   Pt has not had a bowel movement since 1/31/2020. Miralax administered per MAR.

## 2020-02-05 NOTE — PROGRESS NOTES
Right foot osteomyelitis and wound    Patient states his pain is currently well controlled. History of diabetes since last year, controlled with oral meds.    Exam:  Erythema along medial aspect of foot/great toe with dorsal wound over proximal phalanx, mild purulent drainage  Palpable dp pulse  Sensation intact throughout foot    MRI: osteomyelitis and abscess formation along first metatarsal    A: 54M diabetic with right foot osteomyelitis and abscess    P:  Plan for surgical debridement of infection including first ray amputation  I discussed with the patient the he may require additional surgery for later closure.  We will plan for first ray amputation, irrigation debridement, bone biopsy, application of wound vacuum  All risks and benefits of the surgery were discussed with the patient who understands and wishes to proceed.    Juan Randall MD

## 2020-02-05 NOTE — CONSULTS
LIMB PRESERVATION SERVICE CONSULT      REFERRED BY: Dr. Grande    DATE OF CONSULTATION: 2/4/2020    REASON FOR CONSULT: R great toe     HISTORY OF PRESENT ILLNESS: Rob Webster is a 54 y.o.  with a past medical history that includes type 2 diabetes, admitted 2/3/2020 for DM foot (HCC)  DM foot (HCC).   LPS has been consulted for R great toe abscess.      Patient reports on 1/17/2020 he fell in the park and his right great toe began to swell.  He took some Tylenol with no relief.  He tried Arnica gel but still no relief.  The toe became increasingly painful and swollen.  He went to the ER on 1/20/2020.  Was given some pain medicine and discharged home.  He returned to the ED on 2/3/2020 with increased erythema, edema, pain to his foot.  Patient also recalls around Vince time 2019.  Patient found a piece of metal that went through the top part of his shoe over his great toe.  He removed the metal, did not notice any bleeding or wound.    IV antibiotics were started on this admission.  Infectious diseases has not been consulted.    Xray completed and is suspicious  for osteomyelitis.  Ortho has not been consulted yet.    Diagnosed with diabetes May 2019, and is currently managing with oral diabetic agents.  Checks blood sugars every other day and reports that these typically average 1 20-1 70.  Has not had previous diabetes education.    Denies numbness to feet.  Checks feet routinely. Usually wears tennis shoes. Does not have diabetic shoes and inserts. Current occupation: Maintenance.         Smoking:   reports that he has never smoked. He has never used smokeless tobacco.    Alcohol:   reports previous alcohol use.    Drug:   reports no history of drug use.      PAST MEDICAL HISTORY:   Past Medical History:   Diagnosis Date   • Glaucoma 5/23/2012        PAST SURGICAL HISTORY: History reviewed. No pertinent surgical history.    MEDICATIONS:   Current Facility-Administered Medications   Medication Dose   •  vancomycin (VANCOCIN) 1,400 mg in  mL IVPB  17 mg/kg   • potassium bicarbonate (KLYTE) effervescent tablet 50 mEq  50 mEq   • glucose 4 g chewable tablet 16 g  16 g    And   • DEXTROSE 10% BOLUS 250 mL  250 mL   • insulin lispro (HUMALOG) injection 2 Units  2 Units   • tetanus-dipth-acell pertussis (Tdap) inj 0.5 mL  0.5 mL   • senna-docusate (PERICOLACE or SENOKOT S) 8.6-50 MG per tablet 2 Tab  2 Tab    And   • polyethylene glycol/lytes (MIRALAX) PACKET 1 Packet  1 Packet    And   • magnesium hydroxide (MILK OF MAGNESIA) suspension 30 mL  30 mL    And   • bisacodyl (DULCOLAX) suppository 10 mg  10 mg   • acetaminophen (TYLENOL) tablet 650 mg  650 mg   • ampicillin/sulbactam (UNASYN) 3 g in  mL IVPB  3 g   • MD Alert...Vancomycin per Pharmacy     • atorvastatin (LIPITOR) tablet 40 mg  40 mg   • therapeutic multivitamin-minerals (THERAGRAN-M) tablet 1 Tab  1 Tab   • HYDROcodone/acetaminophen (NORCO)  MG per tablet 1 Tab  1 Tab       ALLERGIES:  No Known Allergies     FAMILY HISTORY:   Family History   Problem Relation Age of Onset   • Cancer Neg Hx    • Diabetes Neg Hx    • Heart Disease Neg Hx    • Hypertension Neg Hx          REVIEW OF SYSTEMS:   Constitutional: Negative for chills, fever   Respiratory: Negative for cough and shortness of breath.    Cardiovascular:Negative for chest pain, and claudication.   Gastrointestinal: Negative for constipation, diarrhea, nausea and vomiting.   Lower extremities: positive for swelling and redness right great toe  Neurological: Denies numbness to feet and lower legs  All other systems reviewed and are negative     RESULTS:     Recent Labs     02/03/20  1833 02/04/20  0539   WBC 13.2* 12.8*   RBC 4.99 4.39*   HEMOGLOBIN 14.0 12.2*   HEMATOCRIT 41.6* 36.6*   MCV 83.4 83.4   MCH 28.1 27.8   MCHC 33.7 33.3*   RDW 37.8 37.4   PLATELETCT 361 338   MPV 9.2 9.1     Recent Labs     02/03/20  1833 02/04/20  0539   SODIUM 132* 132*   POTASSIUM 4.1 3.6   CHLORIDE 97 100  "  CO2 22 24   GLUCOSE 245* 205*   BUN 11 9         ESR:     Results from last 7 days   Lab Units 02/03/20  1833   SED RATE WESTERGREN 1526 mm/hour 93*       CRP:       Results from last 7 days   Lab Units 02/03/20  1833   C REACTIVE PROTEIN 4596 mg/dL 24.10*       X-ray: Right foot mildly increased soft tissue swelling.     Periarticular erosion most conspicuous about the great toe proximal phalanx which could represent osteomyelitis end your septic arthritis. MRI without and with contrast is recommended for further evaluation.      MRI: Right foot  First metatarsal and proximal phalanx osteomyelitis with possible necrosis. First metatarsal-phalangeal effusion from septic arthropathy is not seen, indicating the joint fluid is likely draining through the dorsal soft tissues     Dorsal lateral great toe skin ulceration with probable draining sinus and underlying necrotic tissue interposed between the first and second metatarsal shafts, wrapping under the plantar aspect of the proximal first metatarsal shaft     Medial forefoot cellulitis, myositis     Mild midfoot osteoarthritis    Arterial studies: None on record    A1c:  Lab Results   Component Value Date/Time    HBA1C 6.9 (H) 01/21/2020 09:37 AM            Microbiology:  Results     Procedure Component Value Units Date/Time    BLOOD CULTURE x2 [[id number]] Collected:  02/03/20 1632    Order Status:  Completed Specimen:  Blood from Peripheral Updated:  02/04/20 0637     Significant Indicator NEG     Source BLD     Site PERIPHERAL     Culture Result No Growth  Note: Blood cultures are incubated for 5 days and  are monitored continuously.Positive blood cultures  are called to the RN and reported as soon as  they are identified.      Narrative:       Per Hospital Policy: Only change Specimen Src: to \"Line\" if  specified by physician order.  Right AC    BLOOD CULTURE x2 [[id number]] Collected:  02/03/20 1632    Order Status:  Completed Specimen:  Blood from Peripheral " "Updated:  02/04/20 0637     Significant Indicator NEG     Source BLD     Site PERIPHERAL     Culture Result No Growth  Note: Blood cultures are incubated for 5 days and  are monitored continuously.Positive blood cultures  are called to the RN and reported as soon as  they are identified.      Narrative:       Per Hospital Policy: Only change Specimen Src: to \"Line\" if  specified by physician order.  Right Hand    CULTURE WOUND W/ GRAM STAIN [[id number]]     Order Status:  No result Specimen:  Wound from Toe            PHYSICAL EXAMINATION:     VITAL SIGNS: /91   Pulse 87   Temp 36.4 °C (97.5 °F) (Temporal)   Resp 16   Ht 1.6 m (5' 3\")   Wt 79.6 kg (175 lb 7.8 oz)   SpO2 95%   BMI 31.09 kg/m²       General Appearance:  Well developed, well nourished, in no acute distress    Lower Extremity Assessment:    Edema:   Plus 2- 3 to right foot    Pulses:  R foot: +2 DP, +2 PT  L foot: +2 DP, +2 PT    ROM dorsi/plantarflexion  Intact    Structural /mechanical changes:  No abnormal structural changes    Sensory Assessment  Feet Insensate to light touch at toes only      Wound Assessment:    Right great toe dorsal:  Measures approximately 2 x 2 cm  Fluctuant area with purulent sanguinous drainage expressed  Erythema: Severe  No odor    RN to place dry gauze dressing PRN drainage    Education:   - Implications of loss of protective sensation (LOPS) discussed with patient- including increased risk for amputation.  Advised to check feet at least daily, moisturize feet, and to always wear protective foot wear.   -avoid trimming own nails. See podiatrist or certified foot and nail RN  -keep blood sugars <150 for improved wound healing          ASSESSMENT AND PLAN:   Right great toe abscess      Wound care:   Dry gauze dressing PRN drainage    Imaging/Labs:     No further imaging or labs  Vascular status:   Palpable pedal pulses    Surgery: N.p.o. at midnight for right first ray amputation, I&D with " Tonia    Antibiotics: Continue current ABX.  Recommend ID consultation  .  Weight Bearing Status: Heel weight bearing    Offloading: Offloading boot  when ambulating; ordered    PT/OT: consult    Diabetes Education: consult CDE and RD 6.9%    D/W: pt, RN, Dr. Patton, Dr. Randall    Discharge Plan:  Pending antibiotic therapy        Please note that this dictation was created using voice recognition software. I have  worked with technical experts from [facility] to optimize the interface.  I have made every reasonable attempt to correct obvious errors, but there may be errors of grammar and possibly content that I did not discover before finalizing the note.

## 2020-02-05 NOTE — PROGRESS NOTES
Report received from day shift and care of patient assumed. Patient denied pain/other concerns at this time. Vancomycin currently infusing. Pt on CL diet; will be NPO in preparation for surgery at midnight. Will monitor and follow up.

## 2020-02-05 NOTE — CONSULTS
DATE OF SERVICE:  02/04/2020    ORTHOPEDIC CONSULTATION    REQUESTING PHYSICIAN:  Bi Caceres MD, hospitalist service.    REASON FOR CONSULTATION:  Right great toe abscess with concern for   osteomyelitis.    CHIEF COMPLAINT:  Right toe pain and swelling.    HISTORY OF PRESENT ILLNESS:  Patient is a 54-year-old male.  He has a history   of diabetes.  He was admitted to the [facility] internal medicine service yesterday   evening.  He states that he rolled his ankle on 01/20 and came to the ER and   had normal foot x-rays at that point, but over the last 2 weeks, he had pain   in his great toe getting worse including redness, tenderness and swelling, but   without obvious fevers or chills.  He does take metformin and has a history   of diabetes.  I was consulted to provide treatment recommendations for concern   for infection and concern for osteomyelitis on both x-ray and MRI.    ALLERGIES:  No known drug allergies.    OUTPATIENT MEDICATIONS:  Include Lipitor, Glucophage, naproxen, therapeutic   multivitamins.    PAST MEDICAL HISTORY:  Past medical diagnoses:  Diabetes, glaucoma.    PAST SURGICAL HISTORY:  None.    FAMILY HISTORY:  Diabetes.    SOCIAL HISTORY:  Patient denies tobacco, alcohol and illicit drug use.    REVIEW OF SYSTEMS:  He denies fevers, chills, nausea, vomiting, shortness of   breath, chest pain, otherwise normal per AMA criteria other than that already   stated in the HPI.    PHYSICAL EXAMINATION:  VITAL SIGNS:  Temperature is 98.9, heart rate 83, respiratory rate 17, blood   pressure is 139/85, pulse oximetry 96% on room air.  GENERAL APPEARANCE:  Patient is alert, cooperative and pleasant and in no   acute distress.  HEAD, EYES, EARS, NOSE AND THROAT:  Normocephalic and atraumatic.  Mucous   membranes are moist.  PULMONARY:  Symmetric, unlabored breathing.  CARDIOVASCULAR:  Extremities are well perfused.  ABDOMEN:  Not obviously distended.  MUSCULOSKELETAL:  Right lower extremity:  He has quite a bit  of swelling,   erythema and induration of the right great toe with erythema extending to the   dorsum of the forefoot.  He has purulence and ulceration adjacent to the MTP   joint dorsally.  He has diminished sensation in the great toe, but capillary   refill intact to the great toe.  The lesser toes have brisk capillary refill   and sensation intact to light touch.  He has good ankle range of motion.    DIAGNOSTIC IMAGING:  Plain x-rays of the right foot show destructive bony   changes to the distal phalanx.  There are erosive changes to the distal   portion of the metatarsal head and base of the proximal phalanx.  Additional   diagnostic imaging including MRI based on radiology report confirms   osteomyelitis at the first metatarsal head and the proximal phalanx with   associated dorsal abscess.    ASSESSMENT:  A 54-year-old male with a history of diabetes.  He has a right   great toe infection with significant soft tissue involvement.  Clinically, he   has abscess and osteomyelitis involving the distal portion of the first   metatarsal as well as the proximal phalanx as well as the distal phalanx   radiographically.    RECOMMENDATIONS:  1.  I discussed these findings with the patient and I feel given the extent of   his infection as well as confirmation of involvement in the bone that he   would best be served by amputation of the great toe, likely through the first   metatarsal.  We discussed risks of surgery including the potential need for   multiple procedures to obtain wound closure and risk of recurrent infection   requiring further surgery.  2.  He expressed understanding and he wished to proceed with surgery tomorrow   morning.  3.  I recommend he be made n.p.o. after midnight in preparation for surgical   management tomorrow.       ____________________________________     MD EVIE Vazquez / YASH    DD:  02/04/2020 19:45:12  DT:  02/04/2020 20:11:30    D#:  [id number]  Job#:  [id number]

## 2020-02-05 NOTE — CONSULTS
Infectious disease consult note    Reason for Consult:  Asked by Dr James Silverio M.D. to see this patient with left first toe osteomyelitis and MSSA bacteremia  Patient's PCP: Galilea Reyes M.D.    CC:   Chief Complaint   Patient presents with   • Foot Pain     R; recent visit for similar issue - worsening pain. Wearing fracture boots as educated prior.       HPI:  54-year-old male with a past medical history of diabetes type 2 and recent first right toe traumatic injury for which she presented to emergency department on 1/20/2020 presented to emergency department on 2/3/2020 with a 2-week history of worsening right first toe inflammation, erythema and pain.  At the emergency department patient was noted for leukocytosis with WBC at 13 2.  Inflammatory markers were also elevated.  X-ray tissue swelling and pus patient was admitted to surgical floor and started on a vancomycin and Unasyn antibiotic regimen.  Follow-up right foot MRI showing showed first metatarsal and proximal phalanx osteomyelitis with possible necrosis. 2/3/2020 blood cultures grew MSSA.  LPS/orthopedic surgery following case for which patient is scheduled for amputation.  Infectious disease was consulted for recommendations regarding MSSA bacteremia work-up and antibiotic recommendation.    Medications / Drug list prior to admission:  No current facility-administered medications on file prior to encounter.      Current Outpatient Medications on File Prior to Encounter   Medication Sig Dispense Refill   • naproxen (NAPROSYN) 500 MG Tab Take 500 mg by mouth 2 times a day, with meals.     • therapeutic multivitamin-minerals (THERAGRAN-M) Tab Take 1 Tab by mouth every day.     • atorvastatin (LIPITOR) 40 MG Tab Take 40 mg by mouth every day.     • metFORMIN (GLUCOPHAGE) 500 MG Tab Take 500 mg by mouth 2 Times a Day.         Current list of administered Medications:    Current Facility-Administered Medications:   •  [MAR Hold] ceFAZolin in  dextrose (ANCEF) IVPB premix 2 g, 2 g, Intravenous, Q8HRS, Nola Lewis M.D., Stopped at 02/05/20 1158  •  sodium chloride for irrigation 0.9 % irrigant, , , Intra-Op Continuous, Juan Randall M.D., 1,000 mL at 02/05/20 1602  •  [MAR Hold] insulin glargine (LANTUS) injection 10 Units, 10 Units, Subcutaneous, Q EVENING, 10 Units at 02/04/20 1843 **AND** [MAR Hold] insulin lispro (HUMALOG) injection 1-6 Units, 1-6 Units, Subcutaneous, Q6HRS, 1 Units at 02/05/20 1247 **AND** Accu-Chek Q6 if NPO, , , Q6H **AND** NOTIFY MD and PharmD, , , Once **AND** [MAR Hold] glucose 4 g chewable tablet 16 g, 16 g, Oral, Q15 MIN PRN **AND** [MAR Hold] DEXTROSE 10% BOLUS 250 mL, 250 mL, Intravenous, Q15 MIN PRN, Bi Caceres M.D.  •  NS infusion, , Intravenous, Continuous, Bi Caceres M.D., Stopped at 02/05/20 1538  •  [MAR Hold] senna-docusate (PERICOLACE or SENOKOT S) 8.6-50 MG per tablet 2 Tab, 2 Tab, Oral, BID, Stopped at 02/04/20 0600 **AND** [MAR Hold] polyethylene glycol/lytes (MIRALAX) PACKET 1 Packet, 1 Packet, Oral, QDAY PRN, 1 Packet at 02/04/20 2038 **AND** [MAR Hold] magnesium hydroxide (MILK OF MAGNESIA) suspension 30 mL, 30 mL, Oral, QDAY PRN **AND** [MAR Hold] bisacodyl (DULCOLAX) suppository 10 mg, 10 mg, Rectal, QDAY PRN, dA Grande M.D.  •  [MAR Hold] acetaminophen (TYLENOL) tablet 650 mg, 650 mg, Oral, Q6HRS PRN, Ad Grande M.D.  •  [MAR Hold] atorvastatin (LIPITOR) tablet 40 mg, 40 mg, Oral, DAILY, Radha Hedrick M.D., Stopped at 02/04/20 0600  •  [MAR Hold] therapeutic multivitamin-minerals (THERAGRAN-M) tablet 1 Tab, 1 Tab, Oral, DAILY, Radha Hedrick M.D., Stopped at 02/04/20 0600  •  [MAR Hold] HYDROcodone/acetaminophen (NORCO)  MG per tablet 1 Tab, 1 Tab, Oral, Q6HRS PRN, Ad Grande M.D., 1 Tab at 02/04/20 8871    Facility-Administered Medications Ordered in Other Encounters:   •  Lactated Ringers, , , Intra-Op Continuous, Victor M Fischer M.D.  •  fentaNYL (SUBLIMAZE) injection,  ", , PRN, Victor M Fischer M.D., 50 mcg at 02/05/20 1541  •  lidocaine PF (XYLOCAINE-MPF) 2 % injection PF, , , PRNVictor M M.D., 60 mg at 02/05/20 1541  •  propofol (DIPRIVAN) injection, , , PRNVictor M M.D., 180 mg at 02/05/20 1541    Past Medical History:   Diagnosis Date   • Glaucoma 5/23/2012       History reviewed. No pertinent surgical history.    Family History   Problem Relation Age of Onset   • Cancer Neg Hx    • Diabetes Neg Hx    • Heart Disease Neg Hx    • Hypertension Neg Hx      Patient family history was personally reviewed, no pertinent family history to current presentation    Social History     Tobacco Use   • Smoking status: Never Smoker   • Smokeless tobacco: Never Used   Substance Use Topics   • Alcohol use: Not Currently     Comment: no ETOH x17 years   • Drug use: Never       ALLERGIES:  No Known Allergies    Review of systems:  A complete review of symptoms was reviewed with patient. This is reviewed in H&P and PMH. ALL OTHERS reviewed and negative    Physical exam:  Patient Vitals for the past 24 hrs:   BP Temp Temp src Pulse Resp SpO2 Height Weight   02/05/20 1502 (!) 163/90 37.1 °C (98.7 °F) Temporal (!) 105 16 96 % -- --   02/05/20 0800 (!) 161/87 36.7 °C (98.1 °F) Temporal 94 17 97 % 1.6 m (5' 2.99\") 79.6 kg (175 lb 7.8 oz)   02/05/20 0500 158/89 36.3 °C (97.4 °F) Temporal 93 17 97 % -- --   02/04/20 2100 (!) 169/93 36.9 °C (98.4 °F) Temporal (!) 103 17 96 % -- --   02/04/20 1700 139/85 37.2 °C (98.9 °F) Temporal 83 17 96 % -- --     General: No acute distress.   EYES: no jaundice  HEENT: OP clear   Neck: No bruits No JVD.   CVS: RRR. S1 + S2. No M/R/G  Resp: CTAB. No wheezing or crackles/rhonchi.  Abdomen: Soft, NT, ND,  Skin: Grossly nothing acute no obvious rashes  Neurological: Alert, Moves all extremities, no cranial nerve defects on limited exam  Extremities: Right first toe with erythema, swelling, warm and tender to palpation      Data:  Laboratory studies personally " reviewed by me:  Recent Results (from the past 24 hour(s))   ACCU-CHEK GLUCOSE    Collection Time: 02/04/20  5:10 PM   Result Value Ref Range    Glucose - Accu-Ck 223 (H) 65 - 99 mg/dL   ACCU-CHEK GLUCOSE    Collection Time: 02/04/20  9:53 PM   Result Value Ref Range    Glucose - Accu-Ck 251 (H) 65 - 99 mg/dL   ACCU-CHEK GLUCOSE    Collection Time: 02/04/20 11:28 PM   Result Value Ref Range    Glucose - Accu-Ck 223 (H) 65 - 99 mg/dL   Sed Rate    Collection Time: 02/05/20 12:42 AM   Result Value Ref Range    Sed Rate Westergren 91 (H) 0 - 20 mm/hour   CRP QUANTITIVE (NON-CARDIAC)    Collection Time: 02/05/20 12:42 AM   Result Value Ref Range    Stat C-Reactive Protein 25.61 (H) 0.00 - 0.75 mg/dL   CBC WITH DIFFERENTIAL    Collection Time: 02/05/20 12:42 AM   Result Value Ref Range    WBC 12.5 (H) 4.8 - 10.8 K/uL    RBC 4.42 (L) 4.70 - 6.10 M/uL    Hemoglobin 12.3 (L) 14.0 - 18.0 g/dL    Hematocrit 36.7 (L) 42.0 - 52.0 %    MCV 83.0 81.4 - 97.8 fL    MCH 27.8 27.0 - 33.0 pg    MCHC 33.5 (L) 33.7 - 35.3 g/dL    RDW 37.6 35.9 - 50.0 fL    Platelet Count 348 164 - 446 K/uL    MPV 9.1 9.0 - 12.9 fL    Neutrophils-Polys 75.10 (H) 44.00 - 72.00 %    Lymphocytes 10.00 (L) 22.00 - 41.00 %    Monocytes 10.90 0.00 - 13.40 %    Eosinophils 1.30 0.00 - 6.90 %    Basophils 0.50 0.00 - 1.80 %    Immature Granulocytes 2.20 (H) 0.00 - 0.90 %    Nucleated RBC 0.00 /100 WBC    Neutrophils (Absolute) 9.41 (H) 1.82 - 7.42 K/uL    Lymphs (Absolute) 1.25 1.00 - 4.80 K/uL    Monos (Absolute) 1.37 (H) 0.00 - 0.85 K/uL    Eos (Absolute) 0.16 0.00 - 0.51 K/uL    Baso (Absolute) 0.06 0.00 - 0.12 K/uL    Immature Granulocytes (abs) 0.27 (H) 0.00 - 0.11 K/uL    NRBC (Absolute) 0.00 K/uL   Basic Metabolic Panel    Collection Time: 02/05/20 12:42 AM   Result Value Ref Range    Sodium 133 (L) 135 - 145 mmol/L    Potassium 3.6 3.6 - 5.5 mmol/L    Chloride 99 96 - 112 mmol/L    Co2 25 20 - 33 mmol/L    Glucose 217 (H) 65 - 99 mg/dL    Bun 8 8 - 22  mg/dL    Creatinine 0.58 0.50 - 1.40 mg/dL    Calcium 8.8 8.5 - 10.5 mg/dL    Anion Gap 9.0 0.0 - 11.9   MAGNESIUM    Collection Time: 02/05/20 12:42 AM   Result Value Ref Range    Magnesium 2.2 1.5 - 2.5 mg/dL   ESTIMATED GFR    Collection Time: 02/05/20 12:42 AM   Result Value Ref Range    GFR If African American >60 >60 mL/min/1.73 m 2    GFR If Non African American >60 >60 mL/min/1.73 m 2   ACCU-CHEK GLUCOSE    Collection Time: 02/05/20  5:22 AM   Result Value Ref Range    Glucose - Accu-Ck 170 (H) 65 - 99 mg/dL   VANCOMYCIN TROUGH LEVEL    Collection Time: 02/05/20  6:26 AM   Result Value Ref Range    Vancomycin Trough 8.8 (L) 10.0 - 20.0 ug/mL   ACCU-CHEK GLUCOSE    Collection Time: 02/05/20 11:26 AM   Result Value Ref Range    Glucose - Accu-Ck 167 (H) 65 - 99 mg/dL       Imaging:  MR-FOOT-WITH & W/O RIGHT   Final Result      First metatarsal and proximal phalanx osteomyelitis with possible necrosis. First metatarsal-phalangeal effusion from septic arthropathy is not seen, indicating the joint fluid is likely draining through the dorsal soft tissues      Dorsal lateral great toe skin ulceration with probable draining sinus and underlying necrotic tissue interposed between the first and second metatarsal shafts, wrapping under the plantar aspect of the proximal first metatarsal shaft      Medial forefoot cellulitis, myositis      Mild midfoot osteoarthritis      US-RENAL ARTERY DUPLEX COMP   Final Result      DX-FOOT-COMPLETE 3+ RIGHT   Final Result      Mildly increased soft tissue swelling.      Periarticular erosion most conspicuous about the great toe proximal phalanx which could represent osteomyelitis end your septic arthritis. MRI without and with contrast is recommended for further evaluation.      EC-ECHOCARDIOGRAM COMPLETE W/O CONT    (Results Pending)       Active Problems:    Cellulitis of toe, right POA: Unknown    DM (diabetes mellitus) (HCC) POA: Unknown    Hyperlipidemia POA: Unknown     Osteomyelitis of great toe of right foot (HCC) POA: Unknown  Resolved Problems:    * No resolved hospital problems. *      Assessment / Plan:  #MSSA bacteremia  #Right first toe osteomyelitis  #DM2  Presenting with a 2-week history of worsening first toe inflammation  MSSA osteomyelitis likely secondary to skin tear from injury  Leukocytosis trending down slowly  Right foot x-ray showed mildly increased soft tissue swelling, possible osteomyelitis  Right foot MRI showed first metatarsal and proximal phalanx osteomyelitis with possible necrosis  2/3/2020 Blood cultures grew MSSA   Orthopedic following case, OR right first toe amputation today  Was started on vancomycin/Unasyn antibiotic regimen  Continue Ancef (started on 2/5/2020)  Pending TTE  Repeat blood cultures      Thank you for the consult, infectious disease will continue to follow along    It is my pleasure to participate in the care of Mr. Rome Webster.  Please do not hesitate to contact me with questions or concerns.    Please note that this dictation was created using voice recognition software. I have worked with consultants from the vendor as well as technical experts from [facility] to optimize the interface. I have made every reasonable attempt to correct obvious errors, but I expect that there are errors of grammar and possibly content I did not discover before finalizing the note.

## 2020-02-05 NOTE — DISCHARGE PLANNING
"Anticipated Discharge Disposition: TBD    Action: Bedside RN updated that pt would like to talk to LSW. LSW met with pt at bedside and he is inquiring about \"disability paperwork\" and his employer sent paperwork to his PCP Dr. Reyes. Pt mentioned that his HR rep stated Dr. Reyes didn't sign paperwork. Pt is also interested in a diabetic shoe. LSW called diabetic educator and recommended calling [facility] with [facility] as they can usually recommend and order a diabetic boot/shoe for pt's inpatient. LSW called inpatient wound care [phone] and left a message. Discussed with bedside RN, pt is going to have surgery today at 5pm and wound care will be re-consulted after surgery. Pt provided permission for LSW to call his HR rep Teresa Ramos #[phone]. LSW spoke to Teresa and she confirmed it is FMLA paperwork not disability paperwork and she faxed it to pt's PCP as pt has been out of work since 2019 and has been following with his PCP. Teresa relayed she did not get a response from Dr. Reyes.    LSW called Dr. Reyes's office and spoke to Nahomi. [facility] states she did get a request but pt has not been seen since 2017. [facility] asked for pt's  and found she originally had the wrong pt. [facility] was able to look pt up and confirmed pt is a current pt of Dr. Reyes's and pt was seen at the end of January. [facility] requested pt's HR rep re-fax FMLA paperwork to her at fax#[phone] and they can assist. LSW called HR rep Teresa Ramos and updated, she will re-fax request and place pt's  on cover sheet.     Discussed pt's case with [facility] and once pt has surgery they assess for diabetic boot/shoe and can provide a script for a diabetic shoe if needed.          Barriers to Discharge: N/A.     Plan: As above, pt is going to surgery this evening. D/C needs unknown at this time. Await team recommendations.       Care Transition Team Assessment    Information Source  Orientation : Oriented x 4  Information Given By: " Patient  Informant's Name: Rob  Who is responsible for making decisions for patient? : Patient         Elopement Risk  Legal Hold: No  Ambulatory or Self Mobile in Wheelchair: Yes  Disoriented: No  Psychiatric Symptoms: None  History of Wandering: No  Elopement this Admit: No  Vocalizing Wanting to Leave: No  Displays Behaviors, Body Language Wanting to Leave: No-Not at Risk for Elopement  Elopement Risk: Not at Risk for Elopement    Interdisciplinary Discharge Planning  Primary Care Physician: Dr. Reyes  Patient or legal guardian wants to designate a caregiver (see row info): No  Durable Medical Equipment: Not Applicable    Discharge Preparedness  What is your plan after discharge?: Uncertain - pending medical team collaboration  What are your discharge supports?: Child  Prior Functional Level: Ambulatory, Drives Self, Independent with Activities of Daily Living, Independent with Medication Management  Difficulity with ADLs: (pt going to surgery today)  Difficulity with IADLs: None    Functional Assesment  Prior Functional Level: Ambulatory, Drives Self, Independent with Activities of Daily Living, Independent with Medication Management    Finances  Financial Barriers to Discharge: No  Prescription Coverage: Yes    Vision / Hearing Impairment  Vision Impairment : No  Hearing Impairment : No              Domestic Abuse  Have you ever been the victim of abuse or violence?: No  Physical Abuse or Sexual Abuse: No  Verbal Abuse or Emotional Abuse: No  Possible Abuse Reported to:: Not Applicable         Discharge Risks or Barriers  Discharge risks or barriers?: Complex medical needs  Patient risk factors: Complex medical needs    Anticipated Discharge Information  Anticipated discharge disposition: (TBD)  Discharge Address: [address] [place] [address]. [place] [id number]  Discharge Contact Phone Number: [phone]

## 2020-02-05 NOTE — PROGRESS NOTES
TRANSFER OF CARE NOTE:    Patient is a 53 yo M PMH non insulin dependent diabetes who presents with worsening toe pain found to have dorsal right first toe ulceration with likely abscess with high concern from imaging for osteomyelitis requiring surgical intervention.    Patient transferred to family medicine service, discussed care 2/4/19 with Dr. Clement.

## 2020-02-05 NOTE — ASSESSMENT & PLAN NOTE
R/O osteomyelitis, seen on Xray.   -does not appear to be septic arthritis without severe limitation of ROM but may develop later    Plan:  -MRI pending  -Ortho consulted

## 2020-02-05 NOTE — ANESTHESIA PROCEDURE NOTES
Airway  Date/Time: 2/5/2020 3:42 PM  Performed by: Victor M Fischer M.D.  Authorized by: Victor M Fischer M.D.     Location:  OR  Urgency:  Elective  Difficult Airway: No    Indications for Airway Management:  Anesthesia  Spontaneous Ventilation: absent    Sedation Level:  Deep  Preoxygenated: Yes    Mask Difficulty Assessment:  1 - vent by mask  Final Airway Type:  Supraglottic airway  Final Supraglottic Airway:  Standard LMA  SGA Size:  4  Number of Attempts at Approach:  1

## 2020-02-05 NOTE — CARE PLAN
Problem: Communication  Goal: The ability to communicate needs accurately and effectively will improve  Outcome: PROGRESSING AS EXPECTED    Call light within reach, patient uses effectively.     Problem: Safety  Goal: Will remain free from falls  Outcome: PROGRESSING AS EXPECTED   Bed in low locked position,  call light within reach, treaded socks in place, Assistive devices out of reach.

## 2020-02-05 NOTE — PROGRESS NOTES
LIMB PRESERVATION SERVICE     55 y/o male with DM, R great toe abscess      Xray foot with periarticular erosion  MRI results pending      PLAN  NPO midnight for right 1st ray amputation vs I & D  IV abx  Full note to follow

## 2020-02-05 NOTE — NON-PROVIDER
"  [facility] [place] [place]     Attending: Dr. Silverio  Senior Resident: Dr. Lewis  Cj Resident: Dr. Clement  PATIENT: Rob Webster; [id number]; 1965    ID: 54 y.o. male admitted for right big toe and foot pain    Subjective: Pt with PMH of DM2 initially presented 1/20/2020 for foot pain after injuring it during a run. Imaging showed no evidence of fracture and pt was discharged with a walking boot. Pt states that pain has since worsened with associated erythema, inflammation, and white drainage. This AM pt states that he slept well after Norco administration last night and is not in any pain. Is ambulatory with walker when going to the bathroom. Denies any fever, n/v.    MRI results 2/4/2020 suggest \"first metatarsal and proximal phalanx osteomyelitis with possible necrosis.\" Pt is currently on IV Unasyn and Vanco with last dose given as scheduled this morning. Pt is in good spirits and is aware of likely surgical amputation later today.      OBJECTIVE:  Temp:  [36.3 °C (97.4 °F)-37.2 °C (98.9 °F)] 36.7 °C (98.1 °F)  Pulse:  [] 94  Resp:  [17] 17  BP: (139-169)/(85-93) 161/87  SpO2:  [96 %-97 %] 97 %    Intake/Output Summary (Last 24 hours) at 2/5/2020 0816  Last data filed at 2/5/2020 0630  Gross per 24 hour   Intake --   Output 2620 ml   Net -2620 ml       PE:  Gen: No Acute Distress   Pulm: CTABL, no respiratory distress   Cardio: NS1S2 NMRG   Ext: Marked inflammation, erythema, and drainage of the right toe extending into the foot. Intact sensation bilaterally to touch in dermatomal pattern bilaterally.    LABS:  Recent Labs     02/03/20  1833 02/04/20  0539 02/05/20  0042   WBC 13.2* 12.8* 12.5*   RBC 4.99 4.39* 4.42*   HEMOGLOBIN 14.0 12.2* 12.3*   HEMATOCRIT 41.6* 36.6* 36.7*   MCV 83.4 83.4 83.0   MCH 28.1 27.8 27.8   RDW 37.8 37.4 37.6   PLATELETCT 361 338 348   MPV 9.2 9.1 9.1   NEUTSPOLYS 78.10* 75.10* 75.10*   LYMPHOCYTES 9.30* 10.40* 10.00*   MONOCYTES 9.10 11.10 10.90 "   EOSINOPHILS 0.70 0.90 1.30   BASOPHILS 0.80 0.50 0.50     Recent Labs     02/03/20 1833 02/04/20  0539 02/05/20  0042   SODIUM 132* 132* 133*   POTASSIUM 4.1 3.6 3.6   CHLORIDE 97 100 99   CO2 22 24 25   BUN 11 9 8   CREATININE 0.78 0.61 0.58   CALCIUM 9.4 8.9 8.8   MAGNESIUM  --   --  2.2   ALBUMIN 4.1 3.3  --      Estimated GFR/CRCL = Estimated Creatinine Clearance: 139.1 mL/min (by C-G formula based on SCr of 0.58 mg/dL).  Recent Labs     02/03/20 1833 02/04/20  0539  02/04/20 2153 02/04/20 2328 02/05/20 0042 02/05/20  0522   GLUCOSE 245*  --  205*  --   --   --  217*  --    POCGLUCOSE  --    < >  --    < > 251* 223*  --  170*    < > = values in this interval not displayed.     Recent Labs     02/03/20 1833 02/04/20  0539   ASTSGOT 22 14   ALTSGPT 50 39   TBILIRUBIN 0.9 0.9   ALKPHOSPHAT 218* 174*   GLOBULIN 4.3* 3.8*             No results for input(s): INR, APTT, FIBRINOGEN in the last 72 hours.    Invalid input(s): DIMER    MICROBIOLOGY:   No results found for: BLOODCULTU, BLDCULT, BCHOLD     IMAGING:   MR-FOOT-WITH & W/O RIGHT   Final Result      First metatarsal and proximal phalanx osteomyelitis with possible necrosis. First metatarsal-phalangeal effusion from septic arthropathy is not seen, indicating the joint fluid is likely draining through the dorsal soft tissues      Dorsal lateral great toe skin ulceration with probable draining sinus and underlying necrotic tissue interposed between the first and second metatarsal shafts, wrapping under the plantar aspect of the proximal first metatarsal shaft      Medial forefoot cellulitis, myositis      Mild midfoot osteoarthritis      US-RENAL ARTERY DUPLEX COMP   Final Result      DX-FOOT-COMPLETE 3+ RIGHT   Final Result      Mildly increased soft tissue swelling.      Periarticular erosion most conspicuous about the great toe proximal phalanx which could represent osteomyelitis end your septic arthritis. MRI without and with contrast is recommended  for further evaluation.          MEDS:  Current Facility-Administered Medications   Medication Last Dose   • vancomycin (VANCOCIN) 1,400 mg in  mL IVPB 1,400 mg at 02/05/20 0630   • insulin glargine (LANTUS) injection 10 Units 10 Units at 02/04/20 1843    And   • insulin lispro (HUMALOG) injection 1-6 Units 1 Units at 02/05/20 0525    And   • glucose 4 g chewable tablet 16 g      And   • DEXTROSE 10% BOLUS 250 mL     • NS infusion     • senna-docusate (PERICOLACE or SENOKOT S) 8.6-50 MG per tablet 2 Tab Stopped at 02/04/20 0600    And   • polyethylene glycol/lytes (MIRALAX) PACKET 1 Packet 1 Packet at 02/04/20 2038    And   • magnesium hydroxide (MILK OF MAGNESIA) suspension 30 mL      And   • bisacodyl (DULCOLAX) suppository 10 mg     • acetaminophen (TYLENOL) tablet 650 mg     • ampicillin/sulbactam (UNASYN) 3 g in  mL IVPB Stopped at 02/05/20 0554   • MD Alert...Vancomycin per Pharmacy     • atorvastatin (LIPITOR) tablet 40 mg Stopped at 02/04/20 0600   • therapeutic multivitamin-minerals (THERAGRAN-M) tablet 1 Tab Stopped at 02/04/20 0600   • HYDROcodone/acetaminophen (NORCO)  MG per tablet 1 Tab 1 Tab at 02/04/20 2253       PROBLEM LIST:  No problems updated.    ASSESSMENT/PLAN: 54 y.o. male admitted for    # Osteomyelitis  - Evidence on imaging of osteomyelitis with necrosis and soft tissue involvment  - WBC elevated at 12.5  - Cultures show MSSA  - Discontinue Vancomycin, and continue IV Unasyn as scheduled  - Surgery consulted. Plan for surgery, likely amputation today.    # DM2  # Hyperglycemia  - A1C in 1/2020 was 6.9  - Holding metformin for possible surgery  - Currently on Glargine, and sliding scale lispro

## 2020-02-05 NOTE — ANESTHESIA PREPROCEDURE EVALUATION
Relevant Problems   Other   (+) DM (diabetes mellitus) (HCC)   (+) Osteomyelitis of great toe of right foot (HCC)       Physical Exam    Airway   Mallampati: II  TM distance: >3 FB  Neck ROM: full       Cardiovascular - normal exam  Rhythm: regular  Rate: normal  (-) murmur     Dental - normal exam         Pulmonary - normal exam  Breath sounds clear to auscultation     Abdominal    Neurological - normal exam                 Anesthesia Plan    ASA 3   ASA physical status 3 criteria: diabetes - poorly controlled    Plan - general       Airway plan will be LMA        Induction: intravenous    Postoperative Plan: Postoperative administration of opioids is intended.    Pertinent diagnostic labs and testing reviewed    Informed Consent:    Anesthetic plan and risks discussed with patient.

## 2020-02-05 NOTE — PROGRESS NOTES
[facility] [place] [facility]GRESS [place]     Attending: Dr. Silverio    Senior Resident: Dr. Lewis    Cj Resident: Dr. Clement    PATIENT: Rob Webster; [id number]; 1965 Hospital Day: 3    ID: 54 y.o. male with PMH DM II admitted for R great toe swelling, erythema found to have abscess and proximal phalanx and distal 1st metatarsal osteomyelitis requiring ray amputation.     SUBJECTIVE: No acute events overnight. LPS and ortho saw patient, and given MRI result of abscess/ extensive soft tissue involvement, proximal and distal 1st metatarsal osteomyelitis, recommended ray amputation and patient has been NPO since midnight in anticipation of surgery today.    Patient states his pain is well controlled with oxycodone, and also thinks the antibiotics have helped his swelling and discomfort. Has been able to ambulate to rest room with walker.     OBJECTIVE:     Vitals:    02/04/20 0800 02/04/20 1700 02/04/20 2100 02/05/20 0500   BP: 142/91 139/85 (!) 169/93 158/89   Pulse: 87 83 (!) 103 93   Resp: 16 17 17 17   Temp: 36.4 °C (97.5 °F) 37.2 °C (98.9 °F) 36.9 °C (98.4 °F) 36.3 °C (97.4 °F)   TempSrc: Temporal Temporal Temporal Temporal   SpO2: 95% 96% 96% 97%   Weight:       Height:           Intake/Output Summary (Last 24 hours) at 2/5/2020 0616  Last data filed at 2/5/2020 0251  Gross per 24 hour   Intake --   Output 2200 ml   Net -2200 ml       PE:  General: No acute distress, resting comfortably in bed.  HEENT: NC/AT. EOMI. MMM  Cardiovascular: RRR with no M/R/G.  Respiratory: Symmetrical chest. CTAB with no W/R/R  Abdomen: soft, NT/ND, no masses, +BS   EXT:  BATES, 5/5 strength, No C/C/E 1+ pulses DP pulses b/l. R first toe and distal doral foot edema and erythema with area of thinned skin on the dorsal surface of the first MTP joint with visible purulent/ blood-tinged fluid underneath with fluctuance upon palpation and expression of that fluid with pressure.    Neuro: non focal with no numbness, tingling or  changes in sensation    LABS:  Recent Labs     02/03/20 1833 02/04/20 0539 02/05/20  0042   WBC 13.2* 12.8* 12.5*   RBC 4.99 4.39* 4.42*   HEMOGLOBIN 14.0 12.2* 12.3*   HEMATOCRIT 41.6* 36.6* 36.7*   MCV 83.4 83.4 83.0   MCH 28.1 27.8 27.8   RDW 37.8 37.4 37.6   PLATELETCT 361 338 348   MPV 9.2 9.1 9.1   NEUTSPOLYS 78.10* 75.10* 75.10*   LYMPHOCYTES 9.30* 10.40* 10.00*   MONOCYTES 9.10 11.10 10.90   EOSINOPHILS 0.70 0.90 1.30   BASOPHILS 0.80 0.50 0.50     Recent Labs     02/03/20 1833 02/04/20 0539 02/05/20 0042   SODIUM 132* 132* 133*   POTASSIUM 4.1 3.6 3.6   CHLORIDE 97 100 99   CO2 22 24 25   BUN 11 9 8   CREATININE 0.78 0.61 0.58   CALCIUM 9.4 8.9 8.8   MAGNESIUM  --   --  2.2   ALBUMIN 4.1 3.3  --      Estimated GFR/CRCL = Estimated Creatinine Clearance: 139.1 mL/min (by C-G formula based on SCr of 0.58 mg/dL).  Recent Labs     02/03/20 1833 02/04/20 0539  02/04/20 2153 02/04/20 2328 02/05/20 0042 02/05/20  0522   GLUCOSE 245*  --  205*  --   --   --  217*  --    POCGLUCOSE  --    < >  --    < > 251* 223*  --  170*    < > = values in this interval not displayed.     Recent Labs     02/03/20 1833 02/04/20 0539   ASTSGOT 22 14   ALTSGPT 50 39   TBILIRUBIN 0.9 0.9   ALKPHOSPHAT 218* 174*   GLOBULIN 4.3* 3.8*             No results for input(s): INR, APTT, FIBRINOGEN in the last 72 hours.    Invalid input(s): DIMER    MICROBIOLOGY: gram pos cocci in 1/2 peripheral blood cx     IMAGING:   MR-FOOT-WITH & W/O RIGHT   Final Result      First metatarsal and proximal phalanx osteomyelitis with possible necrosis. First metatarsal-phalangeal effusion from septic arthropathy is not seen, indicating the joint fluid is likely draining through the dorsal soft tissues      Dorsal lateral great toe skin ulceration with probable draining sinus and underlying necrotic tissue interposed between the first and second metatarsal shafts, wrapping under the plantar aspect of the proximal first metatarsal shaft      Medial  forefoot cellulitis, myositis      Mild midfoot osteoarthritis      US-RENAL ARTERY DUPLEX COMP   Final Result      DX-FOOT-COMPLETE 3+ RIGHT   Final Result      Mildly increased soft tissue swelling.      Periarticular erosion most conspicuous about the great toe proximal phalanx which could represent osteomyelitis end your septic arthritis. MRI without and with contrast is recommended for further evaluation.            MEDS:  Current Facility-Administered Medications   Medication Last Dose   • vancomycin (VANCOCIN) 1,400 mg in  mL IVPB Stopped at 02/04/20 2115   • potassium bicarbonate (KLYTE) effervescent tablet 50 mEq Stopped at 02/04/20 0715   • insulin glargine (LANTUS) injection 10 Units 10 Units at 02/04/20 1843    And   • insulin lispro (HUMALOG) injection 1-6 Units 1 Units at 02/05/20 0525    And   • glucose 4 g chewable tablet 16 g      And   • DEXTROSE 10% BOLUS 250 mL     • NS infusion     • senna-docusate (PERICOLACE or SENOKOT S) 8.6-50 MG per tablet 2 Tab Stopped at 02/04/20 0600    And   • polyethylene glycol/lytes (MIRALAX) PACKET 1 Packet 1 Packet at 02/04/20 2038    And   • magnesium hydroxide (MILK OF MAGNESIA) suspension 30 mL      And   • bisacodyl (DULCOLAX) suppository 10 mg     • acetaminophen (TYLENOL) tablet 650 mg     • ampicillin/sulbactam (UNASYN) 3 g in  mL IVPB Stopped at 02/05/20 0554   • MD Alert...Vancomycin per Pharmacy     • atorvastatin (LIPITOR) tablet 40 mg Stopped at 02/04/20 0600   • therapeutic multivitamin-minerals (THERAGRAN-M) tablet 1 Tab Stopped at 02/04/20 0600   • HYDROcodone/acetaminophen (NORCO)  MG per tablet 1 Tab 1 Tab at 02/04/20 2253       PROBLEM LIST:  No problems updated.    ASSESSMENT/PLAN:   54 y.o. male with PMH DM II admitted for R great toe swelling, erythema found to have abscess and proximal 1st phalanx and distal 1st metatarsal osteomyelitis requiring ray amputation.       #Osteomyelitis of R great toe  #Abscess distal/ medial  right foot  #1 of 2 blood cultures pos MSSA  #Last tetanus vaccine 12 years prior  2-wk hx edema without known inciting skin break. Seen by ED 2 weeks earlier and x-ray without acute fracture, sent home with walking boot. Upon arrival to ED this visit, presentation consistent with infection and unasyn and vancomycin started. Xray with evidence of osteomyelitis. Afebrile. S/p tetanus vaccine here in hospital. ED with WBC 13.2 and neutrophilia, ESR 93, CRP 24. Subsequent MRI with evidence of osteo (prox phalanx, distal metatarsal). Ortho with recs for ray amputation. At this time, one of two blood cultures with prelim results of MSSA.    - Continue unasyn (d/c vanc now, given MSSA)  - Tylenol, norco PRN for pain  - NPO since midnight   - Anticipate amputation today for source control   - Continue to f/u blood cultures    #NIDDM  A1c 6.9 in Jan 2020. Metforming 500 BID at home. Currently lantus 10 U qHS, and 2 U lispro with meals, with hypoglycemia protoco   - Likely restart metformin after procedure    #HLD  - Continue home atorvastatin 40 qD    Lines: PIV  IVF: maintenance while NPO  Abx: unasyn, vancomycin  GI PPx: multimodal; last BM 01/31  DVT PPx: SCDs  Code: full  Diet: NPO now; to resume DM diet after procedure       Dispo: inpatient for amputation, subsequent recovery    Michelle Clement MD, PGY1

## 2020-02-05 NOTE — PROGRESS NOTES
Daily Progress Note:     Date of Service: 2/4/2020  Primary Team: [facility] Team   Attending: Bi Caceres M.D.   Senior Resident: Dr. Rodriguez  Intern: Camden Patton D.O.  Contact:  [phone]    Chief Complaint:   Chief Complaint   Patient presents with   • Foot Pain     R; recent visit for similar issue - worsening pain. Wearing fracture boots as educated prior.        Subjective:   -Cellulitis: Patient has purulent skin wound over right foot 1st toe with surrounding erythema and mild tenderness.     -Osteomyelitis: Patient has joint movement at toe, limited from swelling not from pain. States pain is well controlled on current regimen, on IV Unasyn and Vancomycin. Xray is most concerning for infectious spread to bone, MRI done to followup and orthopaedics aware. Wound care/limb preservation service following up.    -Diabetes: Patient has diabetes, glucoses in 200's only on home metformin. Currently NPO.    -ADL's: Patient limited secondary to pain/swelling of right foot.    -Dispo: Patient requires IV antibiotics and inpatient management for potential osteomyelitis.    Consultants/Specialty:  Orthopaedics    Review of Systems:    Review of Systems   Constitutional: Negative for chills and fever.   HENT: Negative for sinus pain and sore throat.    Eyes: Negative for blurred vision and double vision.   Respiratory: Negative for cough and shortness of breath.    Cardiovascular: Negative for chest pain and palpitations.   Gastrointestinal: Negative for abdominal pain, blood in stool, constipation, diarrhea, nausea and vomiting.   Genitourinary: Negative for dysuria and frequency.   Musculoskeletal: Positive for falls (2.5 weeks ago). Negative for back pain and joint pain.   Skin: Positive for rash. Negative for itching.   Neurological: Negative for weakness and headaches.   Psychiatric/Behavioral: Negative for substance abuse. The patient is not nervous/anxious.        Objective:   Physical Exam:   Vitals:    Temp:  [36.2 °C (97.2 °F)-37.4 °C (99.4 °F)] 36.4 °C (97.5 °F)  Pulse:  [] 87  Resp:  [14-18] 16  BP: (142-156)/() 142/91  SpO2:  [95 %-98 %] 95 %    Physical Exam  Constitutional:       General: He is not in acute distress.     Appearance: Normal appearance. He is not ill-appearing.   HENT:      Head: Normocephalic and atraumatic.      Nose: Nose normal. No congestion.      Mouth/Throat:      Mouth: Mucous membranes are dry.      Pharynx: No oropharyngeal exudate or posterior oropharyngeal erythema.   Eyes:      Extraocular Movements: Extraocular movements intact.      Pupils: Pupils are equal, round, and reactive to light.   Cardiovascular:      Rate and Rhythm: Normal rate and regular rhythm.      Heart sounds: No murmur. No gallop.    Pulmonary:      Effort: Pulmonary effort is normal. No respiratory distress.      Breath sounds: Normal breath sounds.   Abdominal:      General: Abdomen is flat. Bowel sounds are normal. There is no distension.      Palpations: Abdomen is soft.      Tenderness: There is no tenderness.   Genitourinary:     Rectum: Normal. Guaiac result negative.      Comments: No enlargement of prostate, normal rectal tone  Musculoskeletal:         General: Tenderness present. No swelling.      Comments: ROM of big toe limited by swelling not pain   Skin:     General: Skin is warm and dry.      Capillary Refill: Capillary refill takes less than 2 seconds.      Findings: Erythema and lesion (Noted on dorsal side of right big toe, linear with skin breakdown and yellow purulent exudate with overlying erythema nursing home up big toe) present.   Neurological:      General: No focal deficit present.      Mental Status: He is alert and oriented to person, place, and time.      Motor: No weakness.   Psychiatric:         Mood and Affect: Mood normal.         Behavior: Behavior normal.           Labs:   Recent Labs     02/03/20  1833 02/04/20  0539   WBC 13.2* 12.8*   RBC 4.99 4.39*   HEMOGLOBIN  14.0 12.2*   HEMATOCRIT 41.6* 36.6*   MCV 83.4 83.4   MCH 28.1 27.8   RDW 37.8 37.4   PLATELETCT 361 338   MPV 9.2 9.1   NEUTSPOLYS 78.10* 75.10*   LYMPHOCYTES 9.30* 10.40*   MONOCYTES 9.10 11.10   EOSINOPHILS 0.70 0.90   BASOPHILS 0.80 0.50     [unfilled]  Recent Labs     02/03/20  1833 02/04/20  0539   ALBUMIN 4.1 3.3   TBILIRUBIN 0.9 0.9   ALKPHOSPHAT 218* 174*   TOTPROTEIN 8.4* 7.1   ALTSGPT 50 39   ASTSGOT 22 14   CREATININE 0.78 0.61       Imaging:   US-RENAL ARTERY DUPLEX COMP   Final Result      DX-FOOT-COMPLETE 3+ RIGHT   Final Result      Mildly increased soft tissue swelling.      Periarticular erosion most conspicuous about the great toe proximal phalanx which could represent osteomyelitis end your septic arthritis. MRI without and with contrast is recommended for further evaluation.      MR-FOOT-WITH & W/O RIGHT    (Results Pending)       Assessment and Plan:  Cellulitis of toe, right  Assessment & Plan  -history of diabetes, 2-week history of right toe swelling after fall and hyperflexion, previous ED admission did not look infectious at that time and got worse over last 2 days with erythema, swelling and pus. Does not remember if there was a penetrating wound.  -Last tetanus vaccination 12 years ago.  -Elevated ESR/CRP, met SIRS criteria (HR, WBC) with possible sepsis source from big toe but rapidly improved with antibiotics and pain control  -This seems to be ulcerative wound in setting of diabetes with cellulitis, mild-moderate concern for osteomyelitis  -MRI pending    Plan:  -Abx coverage: ampicillin/sulbactam and vancomycin  -Pain control: acetaminophen, hydrocodone/acetaminophen 10 mg PRN  -NPO at midnight, clear liquids for now dpeending on limb preservation/ortho recommendations  -Holding lovenox if surgery/ I and D needed, continue SCD's for now  -limb preservation service  -f/u blood cultures  -Vascular studies of right lower extremity (arterial doppler, bedside pulse doppler) per limb  preservation  -Transfer to  service for further care    Osteomyelitis of great toe of right foot (HCC)  Assessment & Plan  R/O osteomyelitis, seen on Xray.   -does not appear to be septic arthritis without severe limitation of ROM but may develop later    Plan:  -MRI pending  -Ortho consulted    Hyperlipidemia  Assessment & Plan  Contine atorvasatin (home med) 40 mg       DM (diabetes mellitus) (Formerly [facility])  Assessment & Plan  Hx of DM, last A1c 1/20- 6.9, metformin 500 mg BID at home  -Patient without significant neuropathy noted on exam, states he has had mild numbness/tingling in the past    Plan:  -Patient had MRI, no sepsis, likely stable to resume metformin when diet restarted  -Currently on 2U insulin with meals, but NPO  -Hypoglycemia Protocol      EKG reviewed, Labs reviewed, Medications reviewed and Radiology images reviewed      DVT: Held for possible surgery.  DVT prophylaxis - mechanical: SCDs    Antibiotics: Treating active/ suspected infection

## 2020-02-05 NOTE — PROGRESS NOTES
Pharmacy Kinetics 54 y.o. male on vancomycin day # 1 2/4/2020    Currently on Vancomycin 1400 mg iv q12hr (0700/1900)  Provider specified end date: TBD empirically through 2/6/20 PM    Indication for Treatment: R diabetic foot infection/r/o osteo    Pertinent history per medical record: Admitted on 2/3/2020 for R first toe pain.  2 week history of R toe swelling, worsening on day of admission.  REcently in ER for same reason.    Toe and foot erythematous, hot, tender.  Xray completed with concerns for osteo/septic arthritis.  Broad spectrum abx initiated.  MRI completed with osteomyelitis.    PMH:  DM      Other antibiotics: Unasyn 3 g IV q 6hr    Allergies: Patient has no known allergies.     List concerns for renal function: contrast MRI 2/4/20, DM    Pertinent cultures to date:   2/4/20: PBC x 2- NGTD x 2  2/3/20:  Wound cx ordered - not collected    MRSA nares swab if pneumonia is a concern: N/A    Imaging   2/4/20:  MRI foot - First metatarsal and proximal phalanx osteomyelitis with possible necrosis. First metatarsal-phalangeal effusion from septic arthropathy is not seen, indicating the joint fluid is likely draining through the dorsal soft tissues.  Dorsal lateral great toe skin ulceration with probable draining sinus and underlying necrotic tissue interposed between the first and second metatarsal shafts, wrapping under the plantar aspect of the proximal first metatarsal shaft. Medial forefoot cellulitis, myositis.  Mild midfoot osteoarthritis.        Recent Labs     02/03/20  1833 02/04/20  0539   WBC 13.2* 12.8*   NEUTSPOLYS 78.10* 75.10*     Recent Labs     02/03/20  1833 02/04/20  0539   BUN 11 9   CREATININE 0.78 0.61   ALBUMIN 4.1 3.3     No results for input(s): VANCOTROUGH, VANCOPEAK, VANCORANDOM in the last 72 hours.    Intake/Output Summary (Last 24 hours) at 2/4/2020 1814  Last data filed at 2/4/2020 1700  Gross per 24 hour   Intake 100 ml   Output 2430 ml   Net -2330 ml      /85   Pulse  "83   Temp 37.2 °C (98.9 °F) (Temporal)   Resp 17   Ht 1.6 m (5' 3\")   Wt 79.6 kg (175 lb 7.8 oz)   SpO2 96%  Temp (24hrs), Av.1 °C (98.7 °F), Min:36.4 °C (97.5 °F), Max:37.4 °C (99.4 °F)      A/P Indication:  R diabetic foot infection/r/o osteo  1. Vancomycin dose change: not currently indicated  2. Next vancomycin level: 20 @ 0630 (prior to 4th total dose)  3. Goal trough: 12-16 mcg/mL  4. Comments: MRI with osteo.  LPS consulted, possible I&D tomorrow.  Renal function stable.  Documented UOP adequate.  Minimal concerns for accumulation.  Checked trough tomorrow AM prior to 4th total dose.  Pharmacy to monitor and adjust as needed.      Azucena Mohan, PharmD, BCPS      "

## 2020-02-06 ENCOUNTER — APPOINTMENT (OUTPATIENT)
Dept: CARDIOLOGY | Facility: MEDICAL CENTER | Age: 55
DRG: 854 | End: 2020-02-06
Attending: STUDENT IN AN ORGANIZED HEALTH CARE EDUCATION/TRAINING PROGRAM
Payer: COMMERCIAL

## 2020-02-06 LAB
ANION GAP SERPL CALC-SCNC: 8 MMOL/L (ref 0–11.9)
BACTERIA BLD CULT: ABNORMAL
BACTERIA BLD CULT: ABNORMAL
BUN SERPL-MCNC: 9 MG/DL (ref 8–22)
CALCIUM SERPL-MCNC: 8 MG/DL (ref 8.5–10.5)
CHLORIDE SERPL-SCNC: 99 MMOL/L (ref 96–112)
CO2 SERPL-SCNC: 25 MMOL/L (ref 20–33)
CREAT SERPL-MCNC: 0.72 MG/DL (ref 0.5–1.4)
GLUCOSE BLD-MCNC: 197 MG/DL (ref 65–99)
GLUCOSE BLD-MCNC: 226 MG/DL (ref 65–99)
GLUCOSE BLD-MCNC: 241 MG/DL (ref 65–99)
GLUCOSE BLD-MCNC: 256 MG/DL (ref 65–99)
GLUCOSE SERPL-MCNC: 187 MG/DL (ref 65–99)
LV EJECT FRACT  99904: 65
LV EJECT FRACT MOD 2C 99903: 60.95
LV EJECT FRACT MOD 4C 99902: 62.38
LV EJECT FRACT MOD BP 99901: 62.5
PATHOLOGY CONSULT NOTE: NORMAL
POTASSIUM SERPL-SCNC: 3.7 MMOL/L (ref 3.6–5.5)
SIGNIFICANT IND 70042: ABNORMAL
SITE SITE: ABNORMAL
SODIUM SERPL-SCNC: 132 MMOL/L (ref 135–145)
SOURCE SOURCE: ABNORMAL

## 2020-02-06 PROCEDURE — 82962 GLUCOSE BLOOD TEST: CPT | Mod: 91

## 2020-02-06 PROCEDURE — 36415 COLL VENOUS BLD VENIPUNCTURE: CPT

## 2020-02-06 PROCEDURE — 93306 TTE W/DOPPLER COMPLETE: CPT | Mod: 26 | Performed by: INTERNAL MEDICINE

## 2020-02-06 PROCEDURE — 770006 HCHG ROOM/CARE - MED/SURG/GYN SEMI*

## 2020-02-06 PROCEDURE — 80048 BASIC METABOLIC PNL TOTAL CA: CPT

## 2020-02-06 PROCEDURE — 700105 HCHG RX REV CODE 258: Performed by: STUDENT IN AN ORGANIZED HEALTH CARE EDUCATION/TRAINING PROGRAM

## 2020-02-06 PROCEDURE — 700102 HCHG RX REV CODE 250 W/ 637 OVERRIDE(OP): Performed by: INTERNAL MEDICINE

## 2020-02-06 PROCEDURE — 700102 HCHG RX REV CODE 250 W/ 637 OVERRIDE(OP): Performed by: STUDENT IN AN ORGANIZED HEALTH CARE EDUCATION/TRAINING PROGRAM

## 2020-02-06 PROCEDURE — 700111 HCHG RX REV CODE 636 W/ 250 OVERRIDE (IP): Performed by: STUDENT IN AN ORGANIZED HEALTH CARE EDUCATION/TRAINING PROGRAM

## 2020-02-06 PROCEDURE — A9270 NON-COVERED ITEM OR SERVICE: HCPCS | Performed by: STUDENT IN AN ORGANIZED HEALTH CARE EDUCATION/TRAINING PROGRAM

## 2020-02-06 PROCEDURE — 99233 SBSQ HOSP IP/OBS HIGH 50: CPT | Mod: GC | Performed by: INTERNAL MEDICINE

## 2020-02-06 PROCEDURE — 93306 TTE W/DOPPLER COMPLETE: CPT

## 2020-02-06 RX ORDER — SODIUM CHLORIDE 9 MG/ML
INJECTION, SOLUTION INTRAVENOUS CONTINUOUS
Status: DISCONTINUED | OUTPATIENT
Start: 2020-02-06 | End: 2020-02-08

## 2020-02-06 RX ADMIN — HYDROCODONE BITARTRATE AND ACETAMINOPHEN 1 TABLET: 10; 325 TABLET ORAL at 01:00

## 2020-02-06 RX ADMIN — MULTIPLE VITAMINS W/ MINERALS TAB 1 TABLET: TAB at 04:13

## 2020-02-06 RX ADMIN — CEFAZOLIN SODIUM 2 G: 2 INJECTION, SOLUTION INTRAVENOUS at 14:43

## 2020-02-06 RX ADMIN — CEFAZOLIN SODIUM 2 G: 2 INJECTION, SOLUTION INTRAVENOUS at 04:14

## 2020-02-06 RX ADMIN — ATORVASTATIN CALCIUM 40 MG: 40 TABLET, FILM COATED ORAL at 04:13

## 2020-02-06 RX ADMIN — ACETAMINOPHEN 650 MG: 325 TABLET, FILM COATED ORAL at 04:13

## 2020-02-06 RX ADMIN — INSULIN GLARGINE 10 UNITS: 100 INJECTION, SOLUTION SUBCUTANEOUS at 17:53

## 2020-02-06 RX ADMIN — HYDROCODONE BITARTRATE AND ACETAMINOPHEN 1 TABLET: 10; 325 TABLET ORAL at 21:02

## 2020-02-06 RX ADMIN — HYDROCODONE BITARTRATE AND ACETAMINOPHEN 1 TABLET: 10; 325 TABLET ORAL at 06:46

## 2020-02-06 RX ADMIN — SODIUM CHLORIDE: 9 INJECTION, SOLUTION INTRAVENOUS at 22:56

## 2020-02-06 RX ADMIN — SENNOSIDES AND DOCUSATE SODIUM 2 TABLET: 8.6; 5 TABLET ORAL at 04:13

## 2020-02-06 RX ADMIN — CEFAZOLIN SODIUM 2 G: 2 INJECTION, SOLUTION INTRAVENOUS at 22:56

## 2020-02-06 NOTE — PROGRESS NOTES
Infectious disease progres note    Reason for Consult:  Asked by Dr James Silverio M.D. to see this patient with left first toe osteomyelitis and MSSA bacteremia  Patient's PCP: Galilea Reyes M.D.    CC:   Chief Complaint   Patient presents with   • Foot Pain     R; recent visit for similar issue - worsening pain. Wearing fracture boots as educated prior.       HPI:  54-year-old male with a past medical history of diabetes type 2 and recent first right toe traumatic injury for which she presented to emergency department on 1/20/2020 presented to emergency department on 2/3/2020 with a 2-week history of worsening right first toe inflammation, erythema and pain.  At the emergency department patient was noted for leukocytosis with WBC at 13 2.  Inflammatory markers were also elevated.  X-ray tissue swelling and pus patient was admitted to surgical floor and started on a vancomycin and Unasyn antibiotic regimen.  Follow-up right foot MRI showing showed first metatarsal and proximal phalanx osteomyelitis with possible necrosis. 2/3/2020 blood cultures grew MSSA.  LPS/orthopedic surgery following case for which patient is scheduled for amputation.  Infectious disease was consulted for recommendations regarding MSSA bacteremia work-up and antibiotic recommendation.    Interval events  2/6/2020 - Patient evaluated at bedside and found AAOX4, afebrile and in NAD. Reports some amputation site pain overnight that resolved. No chest pain, dyspnea or palpitations. Repeat blood clx w/NGTD.      Medications / Drug list prior to admission:  No current facility-administered medications on file prior to encounter.      Current Outpatient Medications on File Prior to Encounter   Medication Sig Dispense Refill   • naproxen (NAPROSYN) 500 MG Tab Take 500 mg by mouth 2 times a day, with meals.     • therapeutic multivitamin-minerals (THERAGRAN-M) Tab Take 1 Tab by mouth every day.     • atorvastatin (LIPITOR) 40 MG Tab Take 40 mg  by mouth every day.     • metFORMIN (GLUCOPHAGE) 500 MG Tab Take 500 mg by mouth 2 Times a Day.         Current list of administered Medications:    Current Facility-Administered Medications:   •  NS infusion, , Intravenous, Continuous, Nola Lewis M.D.  •  ceFAZolin in dextrose (ANCEF) IVPB premix 2 g, 2 g, Intravenous, Q8HRS, Nola Lewis M.D., Stopped at 02/06/20 0444  •  insulin glargine (LANTUS) injection 10 Units, 10 Units, Subcutaneous, Q EVENING, 10 Units at 02/05/20 1802 **AND** insulin lispro (HUMALOG) injection 1-6 Units, 1-6 Units, Subcutaneous, Q6HRS, 1 Units at 02/06/20 0809 **AND** Accu-Chek Q6 if NPO, , , Q6H **AND** NOTIFY MD and PharmD, , , Once **AND** glucose 4 g chewable tablet 16 g, 16 g, Oral, Q15 MIN PRN **AND** DEXTROSE 10% BOLUS 250 mL, 250 mL, Intravenous, Q15 MIN PRN, Bi Caceres M.D.  •  senna-docusate (PERICOLACE or SENOKOT S) 8.6-50 MG per tablet 2 Tab, 2 Tab, Oral, BID, 2 Tab at 02/06/20 0413 **AND** polyethylene glycol/lytes (MIRALAX) PACKET 1 Packet, 1 Packet, Oral, QDAY PRN, 1 Packet at 02/04/20 2038 **AND** magnesium hydroxide (MILK OF MAGNESIA) suspension 30 mL, 30 mL, Oral, QDAY PRN **AND** bisacodyl (DULCOLAX) suppository 10 mg, 10 mg, Rectal, QDAY PRN, Ad Grande M.D.  •  acetaminophen (TYLENOL) tablet 650 mg, 650 mg, Oral, Q6HRS PRN, Ad Grande M.D., 650 mg at 02/06/20 0413  •  atorvastatin (LIPITOR) tablet 40 mg, 40 mg, Oral, DAILY, Radha Hedrick M.D., 40 mg at 02/06/20 0413  •  therapeutic multivitamin-minerals (THERAGRAN-M) tablet 1 Tab, 1 Tab, Oral, DAILY, Radha Hedrick M.D., 1 Tab at 02/06/20 0413  •  HYDROcodone/acetaminophen (NORCO)  MG per tablet 1 Tab, 1 Tab, Oral, Q6HRS PRN, Ad Grande M.D., 1 Tab at 02/06/20 0646    Past Medical History:   Diagnosis Date   • Glaucoma 5/23/2012       Past Surgical History:   Procedure Laterality Date   • IRRIGATION & DEBRIDEMENT ORTHO Right 2/5/2020    Procedure: IRRIGATION AND DEBRIDEMENT, With  WOUND  "VAC;  Surgeon: Juan Randall;  Location: SURGERY [facility];  Service: Orthopedics   • TOE AMPUTATION Right 2/5/2020    Procedure: AMPUTATION, TOE- RIGHT GREAT TOE;  Surgeon: Juan Randall;  Location: SURGERY [facility];  Service: Orthopedics       Family History   Problem Relation Age of Onset   • Cancer Neg Hx    • Diabetes Neg Hx    • Heart Disease Neg Hx    • Hypertension Neg Hx      Patient family history was personally reviewed, no pertinent family history to current presentation    Social History     Tobacco Use   • Smoking status: Never Smoker   • Smokeless tobacco: Never Used   Substance Use Topics   • Alcohol use: Not Currently     Comment: no ETOH x17 years   • Drug use: Never       ALLERGIES:  No Known Allergies    Review of systems:  A complete review of symptoms was reviewed with patient. This is reviewed in H&P and PMH. ALL OTHERS reviewed and negative    Physical exam:  Patient Vitals for the past 24 hrs:   BP Temp Temp src Pulse Resp SpO2 Height Weight   02/05/20 1502 (!) 163/90 37.1 °C (98.7 °F) Temporal (!) 105 16 96 % -- --   02/05/20 0800 (!) 161/87 36.7 °C (98.1 °F) Temporal 94 17 97 % 1.6 m (5' 2.99\") 79.6 kg (175 lb 7.8 oz)   02/05/20 0500 158/89 36.3 °C (97.4 °F) Temporal 93 17 97 % -- --   02/04/20 2100 (!) 169/93 36.9 °C (98.4 °F) Temporal (!) 103 17 96 % -- --   02/04/20 1700 139/85 37.2 °C (98.9 °F) Temporal 83 17 96 % -- --     General: No acute distress.   EYES: no jaundice  HEENT: OP clear   Neck: No bruits No JVD.   CVS: RRR. S1 + S2. No M/R/G  Resp: CTAB. No wheezing or crackles/rhonchi.  Abdomen: Soft, NT, ND,  Skin: Grossly nothing acute no obvious rashes  Neurological: Alert, Moves all extremities, no cranial nerve defects on limited exam  Extremities: Right foot covered with wound vac system    Data:  Laboratory studies personally reviewed by me:  Recent Results (from the past 24 hour(s))   ACCU-CHEK GLUCOSE    Collection Time: 02/05/20 11:26 AM "   Result Value Ref Range    Glucose - Accu-Ck 167 (H) 65 - 99 mg/dL   GRAM STAIN    Collection Time: 02/05/20  3:55 PM   Result Value Ref Range    Significant Indicator .     Source WND     Site Right Great Toe Deep     Gram Stain Result Moderate WBCs.  Few Gram positive cocci.      GRAM STAIN    Collection Time: 02/05/20  4:18 PM   Result Value Ref Range    Significant Indicator .     Source WND     Site Right Bone Toe Biopsy     Gram Stain Result Rare WBCs.  Few Gram positive cocci.      BLOOD CULTURE    Collection Time: 02/05/20  4:55 PM   Result Value Ref Range    Significant Indicator NEG     Source BLD     Site PERIPHERAL     Culture Result       No Growth  Note: Blood cultures are incubated for 5 days and  are monitored continuously.Positive blood cultures  are called to the RN and reported as soon as  they are identified.     BLOOD CULTURE    Collection Time: 02/05/20  4:55 PM   Result Value Ref Range    Significant Indicator NEG     Source BLD     Site PERIPHERAL     Culture Result       No Growth  Note: Blood cultures are incubated for 5 days and  are monitored continuously.Positive blood cultures  are called to the RN and reported as soon as  they are identified.     ACCU-CHEK GLUCOSE    Collection Time: 02/05/20  5:58 PM   Result Value Ref Range    Glucose - Accu-Ck 164 (H) 65 - 99 mg/dL   ACCU-CHEK GLUCOSE    Collection Time: 02/05/20 10:09 PM   Result Value Ref Range    Glucose - Accu-Ck 257 (H) 65 - 99 mg/dL   Basic Metabolic Panel    Collection Time: 02/06/20  2:11 AM   Result Value Ref Range    Sodium 132 (L) 135 - 145 mmol/L    Potassium 3.7 3.6 - 5.5 mmol/L    Chloride 99 96 - 112 mmol/L    Co2 25 20 - 33 mmol/L    Glucose 187 (H) 65 - 99 mg/dL    Bun 9 8 - 22 mg/dL    Creatinine 0.72 0.50 - 1.40 mg/dL    Calcium 8.0 (L) 8.5 - 10.5 mg/dL    Anion Gap 8.0 0.0 - 11.9   ESTIMATED GFR    Collection Time: 02/06/20  2:11 AM   Result Value Ref Range    GFR If African American >60 >60 mL/min/1.73 m 2     GFR If Non African American >60 >60 mL/min/1.73 m 2   Histology Request    Collection Time: 02/06/20  7:32 AM   Result Value Ref Range    Pathology Request Sent to Histo    ACCU-CHEK GLUCOSE    Collection Time: 02/06/20  8:04 AM   Result Value Ref Range    Glucose - Accu-Ck 197 (H) 65 - 99 mg/dL       Imaging:  MR-FOOT-WITH & W/O RIGHT   Final Result      First metatarsal and proximal phalanx osteomyelitis with possible necrosis. First metatarsal-phalangeal effusion from septic arthropathy is not seen, indicating the joint fluid is likely draining through the dorsal soft tissues      Dorsal lateral great toe skin ulceration with probable draining sinus and underlying necrotic tissue interposed between the first and second metatarsal shafts, wrapping under the plantar aspect of the proximal first metatarsal shaft      Medial forefoot cellulitis, myositis      Mild midfoot osteoarthritis      US-RENAL ARTERY DUPLEX COMP   Final Result      DX-FOOT-COMPLETE 3+ RIGHT   Final Result      Mildly increased soft tissue swelling.      Periarticular erosion most conspicuous about the great toe proximal phalanx which could represent osteomyelitis end your septic arthritis. MRI without and with contrast is recommended for further evaluation.      EC-ECHOCARDIOGRAM COMPLETE W/O CONT    (Results Pending)       Active Problems:    Cellulitis of toe, right POA: Unknown    DM (diabetes mellitus) (HCC) POA: Unknown    Hyperlipidemia POA: Unknown    Osteomyelitis of great toe of right foot (HCC) POA: Unknown  Resolved Problems:    * No resolved hospital problems. *      Assessment / Plan:  #Right first toe osteomyelitis  #MSSA bacteremia  #DM2  Presenting with a 2-week history of worsening first toe inflammation  MSSA osteomyelitis likely secondary to skin tear from injury  Leukocytosis trending down slowly  Right foot x-ray showed mildly increased soft tissue swelling, possible osteomyelitis  Right foot MRI showed first metatarsal  and proximal phalanx osteomyelitis with possible necrosis  2/3/2020 Blood cultures grew MSSA   2/5/2020 Blood cultures w/NGTD  Orthopedic following case, S/P OR right first toe amputation 2/5/2020  Continue IV Cefazolin 2g TID (started on 2/5/2020, minimum of 4 weeks for now)   Pending TTE      Thank you for the consult, infectious disease will continue to follow along    It is my pleasure to participate in the care of Mr. Rome Webster.  Please do not hesitate to contact me with questions or concerns.    Please note that this dictation was created using voice recognition software. I have worked with consultants from the vendor as well as technical experts from [employer] [facility] [employer] to optimize the interface. I have made every reasonable attempt to correct obvious errors, but I expect that there are errors of grammar and possibly content I did not discover before finalizing the note.

## 2020-02-06 NOTE — ANESTHESIA QCDR
2019 [facility] Clinical Data Registry (for Quality Improvement)     Postoperative nausea/vomiting risk protocol (Adult = 18 yrs and Pediatric 3-17 yrs)- (430 and 463)  General inhalation anesthetic (NOT TIVA) with PONV risk factors: No  Provision of anti-emetic therapy with at least 2 different classes of agents: N/A  Patient DID NOT receive anti-emetic therapy and reason is documented in Medical Record: N/A    Multimodal Pain Management- (477)  Non-emergent surgery AND patient age >= 18: No  Use of Multimodal Pain Management, two or more drugs and/or interventions, NOT including systemic opioids:   Exception: Documented allergy to multiple classes of analgesics:     Smoking Abstinence (404)  Patient is current smoker (cigarette, pipe, e-cig, marijuanna): No  Elective Surgery:   Abstinence instructions provided prior to day of surgery:   Patient abstained from smoking on day of surgery:     Pre-Op Beta-Blocker in Isolated CABG (44)  Isolated CABG AND patient age >= 18: No  Beta-blocker admin within 24 hours of surgical incision:   Exception:of medical reason(s) for not administering beta blocker within 24 hours prior to surgical incision (e.g., not  indicated,other medical reason):     PACU assessment of acute postoperative pain prior to Anesthesia Care End- Applies to Patients Age = 18- (ABG7)  Initial PACU pain score is which of the following: < 7/10  Patient unable to report pain score: N/A    Post-anesthetic transfer of care checklist/protocol to PACU/ICU- (426 and 427)  Upon conclusion of case, patient transferred to which of the following locations: PACU/Non-ICU  Use of transfer checklist/protocol: Yes  Exclusion: Service Performed in Patient Hospital Room (and thus did not require transfer): N/A  Unplanned admission to ICU related to anesthesia service up through end of PACU care- (MD51)  Unplanned admission to ICU (not initially anticipated at anesthesia start time): No

## 2020-02-06 NOTE — NON-PROVIDER
[facility] MEDICINE PROGRESS NOTE     Attending: Dr. Silverio  Senior Resident: Dr. Lewis  Cj Resident: Dr. Clement  PATIENT: Rob Webster; [id number]; 1965, Hospital day 4    Subjective:   Yesterday pt was taken in for right great toe amputation with irrigation and drainage where it was found to be grossly infected, requiring wound vac assistance followed by repeat irrigation and drainage and wound closure tomorrow. Pt verbalizes understanding of NPO status after midnight in preparation of surgery tomorrow.    Pt was given [place] this morning and reports minimal pain currently. Pt was administered scheduled cefazolin this AM. States that he slept well, had a normal bm yesterday morning, and denies n/v, diarrhea, f/c.    OBJECTIVE:  Temp:  [36.7 °C (98 °F)-37.4 °C (99.4 °F)] 37.1 °C (98.7 °F)  Pulse:  [] 94  Resp:  [8-18] 17  BP: (106-163)/(67-90) 139/87  SpO2:  [94 %-100 %] 94 %    Intake/Output Summary (Last 24 hours) at 2/6/2020 0615  Last data filed at 2/6/2020 0438  Gross per 24 hour   Intake 1580 ml   Output 2595 ml   Net -1015 ml       PE:  Gen: No Acute Distress   Pulm: CTABL, no respiratory distress   Cardio: NS1S2 NMRG   Ext: Right foot was wrapped with wound vac assistance    LABS:  Recent Labs     02/03/20  1833 02/04/20  0539 02/05/20  0042   WBC 13.2* 12.8* 12.5*   RBC 4.99 4.39* 4.42*   HEMOGLOBIN 14.0 12.2* 12.3*   HEMATOCRIT 41.6* 36.6* 36.7*   MCV 83.4 83.4 83.0   MCH 28.1 27.8 27.8   RDW 37.8 37.4 37.6   PLATELETCT 361 338 348   MPV 9.2 9.1 9.1   NEUTSPOLYS 78.10* 75.10* 75.10*   LYMPHOCYTES 9.30* 10.40* 10.00*   MONOCYTES 9.10 11.10 10.90   EOSINOPHILS 0.70 0.90 1.30   BASOPHILS 0.80 0.50 0.50     Recent Labs     02/03/20  1833 02/04/20  0539 02/05/20  0042 02/06/20  0211   SODIUM 132* 132* 133* 132*   POTASSIUM 4.1 3.6 3.6 3.7   CHLORIDE 97 100 99 99   CO2 22 24 25 25   BUN 11 9 8 9   CREATININE 0.78 0.61 0.58 0.72   CALCIUM 9.4 8.9 8.8 8.0*   MAGNESIUM  --   --  2.2  --   "  ALBUMIN 4.1 3.3  --   --      Estimated GFR/CRCL = Estimated Creatinine Clearance: 112 mL/min (by C-G formula based on SCr of 0.72 mg/dL).  Recent Labs     02/04/20  0539  02/05/20  0042  02/05/20  1126 02/05/20  1758 02/05/20  2209 02/06/20  0211   GLUCOSE 205*  --  217*  --   --   --   --  187*   POCGLUCOSE  --    < >  --    < > 167* 164* 257*  --     < > = values in this interval not displayed.     Recent Labs     02/03/20  1833 02/04/20  0539   ASTSGOT 22 14   ALTSGPT 50 39   TBILIRUBIN 0.9 0.9   ALKPHOSPHAT 218* 174*   GLOBULIN 4.3* 3.8*             No results for input(s): INR, APTT, FIBRINOGEN in the last 72 hours.    Invalid input(s): DIMER    MICROBIOLOGY:     Procedure Component Value Units Date/Time     BLOOD CULTURE x2 [[id number]]  (Abnormal)  (Susceptibility) Collected:  02/03/20 1632     Order Status:  Completed Specimen:  Blood from Peripheral Updated:  02/06/20 0835       Significant Indicator POS       Source BLD       Site PERIPHERAL       Culture Result Growth detected by Bactec instrument. 02/04/2020  19:13  Staphylococcus aureus (methicillin sensitive)  detected by PCR.           Staphylococcus aureus     Narrative:        CALL  Mccormick  131 tel. [phone],  CALLED  131 tel. [phone] 02/04/2020, 19:16, RB PERF. RESULTS CALLED TO: RN  [id number]  Per Hospital Policy: Only change Specimen Src: to \"Line\" if  specified by physician order.  Right Hand          Susceptibility              Staphylococcus aureus (1)              Antibiotic Interpretation Method Status      Azithromycin Sensitive BELEN Final      Clindamycin Sensitive BELEN Final      Cefazolin Sensitive BELEN Final      Ceftaroline Sensitive BELEN Final      Daptomycin Sensitive BELEN Final      Ampicillin/sulbactam Sensitive BELEN Final      Erythromycin Sensitive BELEN Final      Vancomycin Sensitive BELEN Final      Oxacillin Sensitive BELEN Final      Pip/Tazobactam Sensitive BELEN Final      Trimeth/Sulfa Sensitive BELEN Final      Tetracycline Sensitive " "BELEN Final                        BLOOD CULTURE [[id number]] Collected:  02/05/20 1655     Order Status:  Completed Specimen:  Blood from Peripheral Updated:  02/06/20 0712       Significant Indicator NEG       Source BLD       Site PERIPHERAL       Culture Result No Growth  Note: Blood cultures are incubated for 5 days and  are monitored continuously.Positive blood cultures  are called to the RN and reported as soon as  they are identified.        Narrative:        Per Hospital Policy: Only change Specimen Src: to \"Line\" if  specified by physician order.  Left Hand     BLOOD CULTURE [[id number]] Collected:  02/05/20 1655     Order Status:  Completed Specimen:  Blood from Peripheral Updated:  02/06/20 0712       Significant Indicator NEG       Source BLD       Site PERIPHERAL       Culture Result No Growth  Note: Blood cultures are incubated for 5 days and  are monitored continuously.Positive blood cultures  are called to the RN and reported as soon as  they are identified.        Narrative:        Per Hospital Policy: Only change Specimen Src: to \"Line\" if  specified by physician order.  Left Forearm/Arm     GRAM STAIN [[id number]] Collected:  02/05/20 1555     Order Status:  Completed Specimen:  Wound Updated:  02/05/20 2041       Significant Indicator .       Source WND       Site Right Great Toe Deep       Gram Stain Result Moderate WBCs.  Few Gram positive cocci.        Narrative:        Surgery - swabs received     GRAM STAIN [[id number]] Collected:  02/05/20 1618     Order Status:  Completed Specimen:  Wound Updated:  02/05/20 2040       Significant Indicator .       Source WND       Site Right Bone Toe Biopsy       Gram Stain Result Rare WBCs.  Few Gram positive cocci.        CULTURE WOUND W/ GRAM STAIN [[id number]] Collected:  02/05/20 1618     Order Status:  Completed Specimen:  Other Updated:  02/05/20 1654     Anaerobic Culture [[id number]] Collected:  02/05/20 1618     Order Status:  Completed Specimen:  Other " "Updated:  02/05/20 1654     Anaerobic Culture [[id number]] Collected:  02/05/20 1555     Order Status:  Completed Specimen:  Other Updated:  02/05/20 1653     CULTURE WOUND W/ GRAM STAIN [[id number]] Collected:  02/05/20 1555     Order Status:  Completed Specimen:  Other Updated:  02/05/20 1653     BLOOD CULTURE x2 [[id number]] Collected:  02/03/20 1632     Order Status:  Completed Specimen:  Blood from Peripheral Updated:  02/04/20 0637       Significant Indicator NEG       Source BLD       Site PERIPHERAL       Culture Result No Growth  Note: Blood cultures are incubated for 5 days and  are monitored continuously.Positive blood cultures  are called to the RN and reported as soon as  they are identified.        Narrative:        Per Hospital Policy: Only change Specimen Src: to \"Line\" if  specified by physician order.  Right AC     CULTURE WOUND W/ GRAM STAIN [[id number]]       Order Status:  No result Specimen:  Wound from Toe                   IMAGING:   MR-FOOT-WITH & W/O RIGHT   Final Result      First metatarsal and proximal phalanx osteomyelitis with possible necrosis. First metatarsal-phalangeal effusion from septic arthropathy is not seen, indicating the joint fluid is likely draining through the dorsal soft tissues      Dorsal lateral great toe skin ulceration with probable draining sinus and underlying necrotic tissue interposed between the first and second metatarsal shafts, wrapping under the plantar aspect of the proximal first metatarsal shaft      Medial forefoot cellulitis, myositis      Mild midfoot osteoarthritis      US-RENAL ARTERY DUPLEX COMP   Final Result      DX-FOOT-COMPLETE 3+ RIGHT   Final Result      Mildly increased soft tissue swelling.      Periarticular erosion most conspicuous about the great toe proximal phalanx which could represent osteomyelitis end your septic arthritis. MRI without and with contrast is recommended for further evaluation.      EC-ECHOCARDIOGRAM COMPLETE W/O CONT    " (Results Pending)       MEDS:  Current Facility-Administered Medications   Medication Last Dose   • ceFAZolin in dextrose (ANCEF) IVPB premix 2 g Stopped at 02/06/20 0444   • insulin glargine (LANTUS) injection 10 Units 10 Units at 02/05/20 1802    And   • insulin lispro (HUMALOG) injection 1-6 Units 3 Units at 02/05/20 2210    And   • glucose 4 g chewable tablet 16 g      And   • DEXTROSE 10% BOLUS 250 mL     • NS infusion     • senna-docusate (PERICOLACE or SENOKOT S) 8.6-50 MG per tablet 2 Tab 2 Tab at 02/06/20 0413    And   • polyethylene glycol/lytes (MIRALAX) PACKET 1 Packet 1 Packet at 02/04/20 2038    And   • magnesium hydroxide (MILK OF MAGNESIA) suspension 30 mL      And   • bisacodyl (DULCOLAX) suppository 10 mg     • acetaminophen (TYLENOL) tablet 650 mg 650 mg at 02/06/20 0413   • atorvastatin (LIPITOR) tablet 40 mg 40 mg at 02/06/20 0413   • therapeutic multivitamin-minerals (THERAGRAN-M) tablet 1 Tab 1 Tab at 02/06/20 0413   • HYDROcodone/acetaminophen (NORCO)  MG per tablet 1 Tab 1 Tab at 02/06/20 0100       PROBLEM LIST:  No problems updated.    ASSESSMENT/PLAN: 54 y.o. male admitted for     # Osteomyelitis  # Bacteremia  - S/p Irrigation and drainage with wound vac assistance until repeat I & D with wound closure tomorrow.  - Cultures show MSSA, continue cefazolin  - Pt will be NPO after midnight until procedure tomorrow  - Pain management with [place] PRN    # MSSA bacteremia  - 1/2 blood cultures 2/3/2020 positive for MSSA. In setting of confirmed osteomyelitis, pt requires 4 weeks IV cefazolin  - Echo ordered due to risk of endocarditis       # DM2  # Hyperglycemia  - A1C in 1/2020 was 6.9  - Holding metformin until after tomorrow's surgery  - Currently on Glargine, and sliding scale lispro    # Post procedure constipation prophylaxis  - Senna-docusate PRN'    # Hyperlipidemia  - Continue home atorvastatin 40 mg QD

## 2020-02-06 NOTE — ANESTHESIA TIME REPORT
Anesthesia Start and Stop Event Times     Date Time Event    2/5/2020 1526 Ready for Procedure     1538 Anesthesia Start     1637 Anesthesia Stop        Responsible Staff  02/05/20    Name Role Begin End    Victor M Fischer M.D. Anesth 1538 1637        Preop Diagnosis (Free Text):  Pre-op Diagnosis     Right foot osteomyelitis        Preop Diagnosis (Codes):    Post op Diagnosis  Osteomyelitis of great toe of right foot (HCC)      Premium Reason  A. 3PM - 7AM    Comments:

## 2020-02-06 NOTE — OP REPORT
POST-OP NOTE FOR LIMB PRESERVATION SERVICE    SURGERY DATE: 2/5/2020    PROCEDURE: Procedure(s):  IRRIGATION AND DEBRIDEMENT, With  WOUND VAC - Wound Class: Dirty or Infected  AMPUTATION, TOE- RIGHT GREAT TOE - Wound Class: Dirty or Infected    WEIGHT BEARING STATUS: Heel weight bearing    PT CONSULT: Yes    ANTIBIOTICS: Continue current ABX    PLAN TO RETURN TO O.R.: Yes  Date: 2/7/20  Provider: Tonia    WOUND CARE PLAN:  Wound vac to be replaced/removed in OR    FOLLOW-UP: OP Wound [facility]    DURABLE MEDICAL EQUIPMENT: Offloading shoe    OTHER:       Juan Randall M.D.

## 2020-02-06 NOTE — PROGRESS NOTES
[facility]GRESS [facility]     Attending:   James Silverio MD    Resident:   Nola Lewis MD    PATIENT: Rob Webster; [id number]; 1965    ID: 54 y.o. male PMH DM II admitted for R great toe swelling, erythema found to have abscess and proximal phalanx and distal 1st metatarsal osteomyelitis requiring ray amputation. POD #1    SUBJECTIVE: No acute events overnight. Went for ray amputation with washout yesterday and has been doing well since. Ate dinner, drinking normally and voiding. Passing flatus but no BM yet. Pain well controlled.    OBJECTIVE:     Vitals:    02/05/20 1900 02/05/20 1930 02/06/20 0038 02/06/20 0438   BP: 146/89 138/88 141/88 139/87   Pulse: (!) 105 (!) 102 96 94   Resp: 18 17 17 17   Temp: 37.1 °C (98.8 °F) 37.4 °C (99.4 °F) 36.9 °C (98.4 °F) 37.1 °C (98.7 °F)   TempSrc: Temporal Temporal Temporal Temporal   SpO2: 95% 97% 96% 94%   Weight:       Height:           Intake/Output Summary (Last 24 hours) at 2/6/2020 0652  Last data filed at 2/6/2020 0438  Gross per 24 hour   Intake 1580 ml   Output 2175 ml   Net -595 ml       PE:   General: No acute distress, resting comfortably in bed.  HEENT: Normocephalic, atraumatic. EOMI. MMM  Cardiovascular: RRR with no M/R/G. Loud S2  Respiratory: Symmetrical chest. Clear to auscultation bilaterally with no wheezes, rales or rhonchi  Abdomen: soft, non-tender, non-distended no masses, +BS   EXT:  Moves all extremities. Clean dressing present on the right foot with wound vac in place draining serousangious fluid. 2+ pitting edema in right ankle above dressing. No edema in left LE.  Neuro: non focal, sensation grossly intact    LABS:  Recent Labs     02/03/20  1833 02/04/20  0539 02/05/20  0042   WBC 13.2* 12.8* 12.5*   RBC 4.99 4.39* 4.42*   HEMOGLOBIN 14.0 12.2* 12.3*   HEMATOCRIT 41.6* 36.6* 36.7*   MCV 83.4 83.4 83.0   MCH 28.1 27.8 27.8   RDW 37.8 37.4 37.6   PLATELETCT 361 338 348   MPV 9.2 9.1 9.1   NEUTSPOLYS 78.10* 75.10* 75.10*  "  LYMPHOCYTES 9.30* 10.40* 10.00*   MONOCYTES 9.10 11.10 10.90   EOSINOPHILS 0.70 0.90 1.30   BASOPHILS 0.80 0.50 0.50     Recent Labs     02/03/20  1833 02/04/20  0539 02/05/20  0042 02/06/20  0211   SODIUM 132* 132* 133* 132*   POTASSIUM 4.1 3.6 3.6 3.7   CHLORIDE 97 100 99 99   CO2 22 24 25 25   BUN 11 9 8 9   CREATININE 0.78 0.61 0.58 0.72   CALCIUM 9.4 8.9 8.8 8.0*   MAGNESIUM  --   --  2.2  --    ALBUMIN 4.1 3.3  --   --      Estimated GFR/CRCL = Estimated Creatinine Clearance: 112 mL/min (by C-G formula based on SCr of 0.72 mg/dL).  Recent Labs     02/04/20  0539  02/05/20  0042  02/05/20  1126 02/05/20  1758 02/05/20  2209 02/06/20  0211   GLUCOSE 205*  --  217*  --   --   --   --  187*   POCGLUCOSE  --    < >  --    < > 167* 164* 257*  --     < > = values in this interval not displayed.     Recent Labs     02/03/20  1833 02/04/20  0539   ASTSGOT 22 14   ALTSGPT 50 39   TBILIRUBIN 0.9 0.9   ALKPHOSPHAT 218* 174*   GLOBULIN 4.3* 3.8*             No results for input(s): INR, APTT, FIBRINOGEN in the last 72 hours.    Invalid input(s): DIMER    MICROBIOLOGY:   Results     Procedure Component Value Units Date/Time    BLOOD CULTURE x2 [[id number]]  (Abnormal)  (Susceptibility) Collected:  02/03/20 1632    Order Status:  Completed Specimen:  Blood from Peripheral Updated:  02/06/20 0835     Significant Indicator POS     Source BLD     Site PERIPHERAL     Culture Result Growth detected by Bactec instrument. 02/04/2020  19:13  Staphylococcus aureus (methicillin sensitive)  detected by PCR.        Staphylococcus aureus    Narrative:       CALL  Mccormick  131 tel. [phone],  CALLED  131 tel. [phone] 02/04/2020, 19:16, RB PERF. RESULTS CALLED TO: RN  [phone]  Per Hospital Policy: Only change Specimen Src: to \"Line\" if  specified by physician order.  Right Hand    Susceptibility     Staphylococcus aureus (1)     Antibiotic Interpretation Method Status    Azithromycin Sensitive BELEN Final    Clindamycin Sensitive BELEN Final " "   Cefazolin Sensitive BELEN Final    Ceftaroline Sensitive BELEN Final    Daptomycin Sensitive BELEN Final    Ampicillin/sulbactam Sensitive BELEN Final    Erythromycin Sensitive BELEN Final    Vancomycin Sensitive BELEN Final    Oxacillin Sensitive BELEN Final    Pip/Tazobactam Sensitive BELEN Final    Trimeth/Sulfa Sensitive BELEN Final    Tetracycline Sensitive BELEN Final                   BLOOD CULTURE [[id number]] Collected:  02/05/20 1655    Order Status:  Completed Specimen:  Blood from Peripheral Updated:  02/06/20 0712     Significant Indicator NEG     Source BLD     Site PERIPHERAL     Culture Result No Growth  Note: Blood cultures are incubated for 5 days and  are monitored continuously.Positive blood cultures  are called to the RN and reported as soon as  they are identified.      Narrative:       Per Hospital Policy: Only change Specimen Src: to \"Line\" if  specified by physician order.  Left Hand    BLOOD CULTURE [[id number]] Collected:  02/05/20 1655    Order Status:  Completed Specimen:  Blood from Peripheral Updated:  02/06/20 0712     Significant Indicator NEG     Source BLD     Site PERIPHERAL     Culture Result No Growth  Note: Blood cultures are incubated for 5 days and  are monitored continuously.Positive blood cultures  are called to the RN and reported as soon as  they are identified.      Narrative:       Per Hospital Policy: Only change Specimen Src: to \"Line\" if  specified by physician order.  Left Forearm/Arm    GRAM STAIN [[id number]] Collected:  02/05/20 1555    Order Status:  Completed Specimen:  Wound Updated:  02/05/20 2041     Significant Indicator .     Source WND     Site Right Great Toe Deep     Gram Stain Result Moderate WBCs.  Few Gram positive cocci.      Narrative:       Surgery - swabs received    GRAM STAIN [[id number]] Collected:  02/05/20 1618    Order Status:  Completed Specimen:  Wound Updated:  02/05/20 2040     Significant Indicator .     Source WND     Site Right Bone Toe Biopsy     Gram " "Stain Result Rare WBCs.  Few Gram positive cocci.      CULTURE WOUND W/ GRAM STAIN [[id number]] Collected:  02/05/20 1618    Order Status:  Completed Specimen:  Other Updated:  02/05/20 1654    Anaerobic Culture [[id number]] Collected:  02/05/20 1618    Order Status:  Completed Specimen:  Other Updated:  02/05/20 1654    Anaerobic Culture [[id number]] Collected:  02/05/20 1555    Order Status:  Completed Specimen:  Other Updated:  02/05/20 1653    CULTURE WOUND W/ GRAM STAIN [[id number]] Collected:  02/05/20 1555    Order Status:  Completed Specimen:  Other Updated:  02/05/20 1653    BLOOD CULTURE x2 [[id number]] Collected:  02/03/20 1632    Order Status:  Completed Specimen:  Blood from Peripheral Updated:  02/04/20 0637     Significant Indicator NEG     Source BLD     Site PERIPHERAL     Culture Result No Growth  Note: Blood cultures are incubated for 5 days and  are monitored continuously.Positive blood cultures  are called to the RN and reported as soon as  they are identified.      Narrative:       Per Hospital Policy: Only change Specimen Src: to \"Line\" if  specified by physician order.  Right AC    CULTURE WOUND W/ GRAM STAIN [[id number]]     Order Status:  No result Specimen:  Wound from Toe             IMAGING:   MR-FOOT-WITH & W/O RIGHT   Final Result      First metatarsal and proximal phalanx osteomyelitis with possible necrosis. First metatarsal-phalangeal effusion from septic arthropathy is not seen, indicating the joint fluid is likely draining through the dorsal soft tissues      Dorsal lateral great toe skin ulceration with probable draining sinus and underlying necrotic tissue interposed between the first and second metatarsal shafts, wrapping under the plantar aspect of the proximal first metatarsal shaft      Medial forefoot cellulitis, myositis      Mild midfoot osteoarthritis      US-RENAL ARTERY DUPLEX COMP   Final Result      DX-FOOT-COMPLETE 3+ RIGHT   Final Result      Mildly increased soft tissue " swelling.      Periarticular erosion most conspicuous about the great toe proximal phalanx which could represent osteomyelitis end your septic arthritis. MRI without and with contrast is recommended for further evaluation.      EC-ECHOCARDIOGRAM COMPLETE W/O CONT    (Results Pending)       MEDS:  Current Facility-Administered Medications   Medication Last Dose   • ceFAZolin in dextrose (ANCEF) IVPB premix 2 g Stopped at 02/06/20 0444   • insulin glargine (LANTUS) injection 10 Units 10 Units at 02/05/20 1802    And   • insulin lispro (HUMALOG) injection 1-6 Units 3 Units at 02/05/20 2210    And   • glucose 4 g chewable tablet 16 g      And   • DEXTROSE 10% BOLUS 250 mL     • NS infusion     • senna-docusate (PERICOLACE or SENOKOT S) 8.6-50 MG per tablet 2 Tab 2 Tab at 02/06/20 0413    And   • polyethylene glycol/lytes (MIRALAX) PACKET 1 Packet 1 Packet at 02/04/20 2038    And   • magnesium hydroxide (MILK OF MAGNESIA) suspension 30 mL      And   • bisacodyl (DULCOLAX) suppository 10 mg     • acetaminophen (TYLENOL) tablet 650 mg 650 mg at 02/06/20 0413   • atorvastatin (LIPITOR) tablet 40 mg 40 mg at 02/06/20 0413   • therapeutic multivitamin-minerals (THERAGRAN-M) tablet 1 Tab 1 Tab at 02/06/20 0413   • HYDROcodone/acetaminophen (NORCO)  MG per tablet 1 Tab 1 Tab at 02/06/20 0646         ASSESSMENT/PLAN:   54 y.o. male with PMH DM II admitted for R great toe swelling, erythema found to have abscess and proximal 1st phalanx and distal 1st metatarsal osteomyelitis requiring ray amputation. Now with MSSA bacteremia.        #Osteomyelitis of R great toe  #Abscess distal/ medial right foot  #Last tetanus vaccine 12 years prior  2-wk hx edema without known inciting skin break. Seen by ED 2 weeks earlier and x-ray without acute fracture, sent home with walking boot. Upon arrival to ED this visit, presentation consistent with infection and unasyn and vancomycin started. Xray with evidence of osteomyelitis. Afebrile.  S/p tetanus vaccine here in hospital. ED with WBC 13.2 and neutrophilia, ESR 93, CRP 24. Subsequent MRI with evidence of osteo (prox phalanx, distal metatarsal). POD #1 ray amputation.   - Continue ancef, per ID will likely need 4 weeks IV abx  - Tylenol, norco PRN for pain  - Ortho plans to return to OR tomorrow for additional washout and hopefully closure  - NPO at midnight  - Continue to f/u blood cultures/wound cultures  - Will need PT/OT eval after surgery tomorrow  - OK to heel weight bear per Ortho  - Diabetic shoe delivered    #MSSA Bacteremia  1 of 2 blood culture bottles positive for MSSA, pan sensitive  ID consulted yesterday and agreed with de-escalation of abx to ancef  Also recommending echocardiogram and repeat blood cultures which are ordered and pending. Appreciated their recommendations  - Continue ancef as above  - Follow up Echo results  - Follow repeat blood culture results to ensure erradication     #NIDDM  A1c 6.9 in Jan 2020. Metformin 500 BID at home. Currently lantus 10 U qHS, and ISS  NPO yesterday, sugars well controlled and minimal insulin coverage required  Will follow insulin needs while eating and adjust as needed  Likely restart metformin after surgery tomorrow     #HLD  - Continue home atorvastatin 40 qD     Lines: PIV  IVF: maintenance while NPO  Abx: Ancef  GI PPx: multimodal; last BM 01/31  DVT PPx: SCDs  Code: full  Diet: Diabetic diet, NPO at midnight        Dispo: Inpatient for additional surgery, IV abx

## 2020-02-06 NOTE — PROGRESS NOTES
Right foot grossly infected at time of amputation and wound vac application.   Plan for repeat I and D, possible wound closure on Friday, 2/7/20.  NPO after midnight Thursday night      Juan Randall MD

## 2020-02-06 NOTE — CARE PLAN
Problem: Communication  Goal: The ability to communicate needs accurately and effectively will improve  Outcome: PROGRESSING AS EXPECTED  Note:   Patient able to communicate needs. All questions answered at this time.      Problem: Safety  Goal: Will remain free from injury  Outcome: PROGRESSING AS EXPECTED  Note:   Non-slip socks are on, bed is locked and in lowest position, personal belongings are within reach, room is free of clutter and spills, call light is in place. Patient educated on how to use the call light and how to call for assistance. Patient verbalized understanding.       Problem: Infection  Goal: Will remain free from infection  Outcome: PROGRESSING AS EXPECTED  Note:   Standard precautions in place. Cleaned hands before and after patient care to prevent the spread of germs.

## 2020-02-06 NOTE — OP REPORT
DATE OF SERVICE:  02/05/2020    SERVICE:  Orthopedic surgery.    PREOPERATIVE DIAGNOSES:  1.  Diabetes mellitus.  2.  Right foot osteomyelitis.  3.  Right foot draining sinus tract with open wound.    POSTOPERATIVE DIAGNOSES:  1.  Diabetes mellitus.  2.  Right foot osteomyelitis.  3.  Right foot draining sinus tract with open wound.    PROCEDURES PERFORMED:  1.  Right foot irrigation and debridement to the level of skin, subcutaneous   tissue, tendon, bone.  2.  Right foot first ray amputation to the metatarsal shaft.  3.  Right first metatarsal bone biopsy.  4.  Right foot wound vacuum application of approximately 3x12 cm.    COMPLICATIONS:  None.    ESTIMATED BLOOD LOSS:  50 mL    SPECIMENS:  Right foot deep culture x2 and right foot bone biopsy for   permanent specimen.    FINDINGS: Gross purulence within the right foot    TOURNIQUET TIME:  14 minutes.    INDICATION FOR PROCEDURE:  The patient is a very pleasant 54-year-old diabetic   who was recently diagnosed last summer with diabetes, who had sustained an   injury to his right foot in January of this year.  He was seen in urgent care   and discharged and then subsequently presented with increased erythema as well   as drainage and fevers and chills.  He was admitted to the hospital service.    He was found to have increased white count.  MRI evaluation showed a sinus   tract as well as osteomyelitis and necrosis of the proximal phalanx as well as   the metatarsal head and tracking of the soft tissue infection underneath the   plantar aspect of the first and second metatarsals.  Risks of the procedure   were discussed with the patient, which include but not limited to bleeding,   infection, damage to surrounding nerves, tendons, ligaments, other structures,   risk of need for future revision surgery, risk of staged procedure, risk of   worsening infection, and need for higher level of amputation.  Patient wished   to proceed and the surgical consent forms were  signed.    DESCRIPTION OF PROCEDURE:  On the day of the surgery, the patient was met in   the preoperative area where the operative extremity was identified by myself,   confirmed with the patient, and marked with my initials.  Patient was then   brought back to the operating room and placed supine on the operating room   table.  General endotracheal anesthesia was undertaken.  The patient was   continued on his preoperative antibiotics, vancomycin and Ancef.  Right lower   extremity was then prepped and draped in the usual sterile fashion with a   disciplinary timeout was performed confirming the correct site, correct   patient, and correct procedure.  Once all were in agreement, we placed a   sterile leg tourniquet on the distal aspect of the leg.  The foot was then   elevated for approximately 1-2 minutes of time in the setting of infection.    The tourniquet was brought to 250 mmHg.  We began by direct medial incision to   the first metatarsal and caring circumferentially and around the draining   sinus tract along the dorsal lateral aspect of the great toe.  This was then   circumferentially carried around the proximal phalanx of the great toe.  Skin   and subcutaneous tissues were incised longitudinally.  Full thickness skin   flaps were created.  The metatarsal shaft was identified. Epi retractors were placed   dorsal and plantar to this.  An oblique osteotomy was then created at the   level of the first metatarsal shaft.  This was then beveled down with use of   an oscillating saw.  The great toe and remaining surrounding stitches were   then removed off the field.  Deep cultures were taken within the plantar   aspect of the wound and immediately upon entering the wound, there was gross   purulence noted throughout the first metatarsophalangeal joint as well as the   deep surrounding soft tissues.  Given amount of gross purulence noted in the   wound, this is decided to be a staged procedure he would  likely benefit from   additional irrigation and debridement in the future.  The remainder of the   soft tissues as well as bone were then irrigated and debrided thoroughly with   the use of blunt dissection with a rongeur, curette, as well as sharp   dissection with scissors and 15-blade scalpel.  There was found to be an   addition fluid collection in the plantar aspect of the first metatarsal   adjacent to the second metatarsal.  This was thoroughly evacuated and   debrided.  Following this, we copiously irrigated the wound with 2 liters of   normal saline.  After copious irrigation of the wound, soft tissues were   assessed and found to have bleeding in viable tissue.  We then turned our   attention to the first metatarsal osteotomy site.  After this had been   debrided ed, we then took a bone sample on to this for culture as well as   permanent pathology for emergent.  Once this was done, the wound was then   again copiously irrigated with a liter of normal saline.  This was then   followed by placement of wound VAC sponge deep into the wound and then on top   of this.  Good suction seal was created.  Sterile dressings were applied.  The   wound VAC was hooked up to 125 mmHg low continuous suction.  The patient was   then awoken from anesthesia, moved onto the postoperative gurney, and to PACU   in stable condition.    POSTOPERATIVE PLAN:  The patient will require additional trip to the operating   room within 2-3 days for repeat irrigation and debridement and likely wound   culture.  We will follow up what his intraoperative culture show.  He can be heel  weightbearing as tolerated in a boot that is offloading for the area of the   amputation.  He will be continued on broad-spectrum antibiotics until his   cultures finalized.       ____________________________________     MD JULISSA Sanchez / YASH    DD:  02/05/2020 16:57:40  DT:  02/05/2020 20:58:32    D#:  [id number]  Job#:  [id number]

## 2020-02-06 NOTE — ANESTHESIA POSTPROCEDURE EVALUATION
Patient: Rob Webster    Procedure Summary     Date:  02/05/20 Room / Location:  [place] [address] / SURGERY [facility]    Anesthesia Start:  1538 Anesthesia Stop:  1637    Procedures:       IRRIGATION AND DEBRIDEMENT, With  WOUND VAC (Right Toe)      AMPUTATION, TOE- RIGHT GREAT TOE (Right Toe) Diagnosis:  (Right foot osteomyelitis)    Surgeon:  Juan Randall Responsible Provider:  Victor M Fischer M.D.    Anesthesia Type:  general ASA Status:  3          Final Anesthesia Type: general  Last vitals  BP   Blood Pressure: (!) 163/90(pt reports he is very nervous)    Temp   37.1 °C (98.7 °F)    Pulse   Pulse: (!) 105   Resp   16    SpO2   96 %      Anesthesia Post Evaluation    Patient location during evaluation: PACU  Patient participation: complete - patient participated  Level of consciousness: sleepy but conscious    Airway patency: patent  Anesthetic complications: no  Cardiovascular status: hemodynamically stable  Respiratory status: acceptable  Hydration status: euvolemic    PONV: none           Nurse Pain Score: 3 (NPRS)

## 2020-02-07 ENCOUNTER — ANESTHESIA EVENT (OUTPATIENT)
Dept: SURGERY | Facility: MEDICAL CENTER | Age: 55
DRG: 854 | End: 2020-02-07
Payer: COMMERCIAL

## 2020-02-07 ENCOUNTER — ANESTHESIA (OUTPATIENT)
Dept: SURGERY | Facility: MEDICAL CENTER | Age: 55
DRG: 854 | End: 2020-02-07
Payer: COMMERCIAL

## 2020-02-07 LAB
BACTERIA BLD CULT: ABNORMAL
BACTERIA BLD CULT: ABNORMAL
BACTERIA WND AEROBE CULT: ABNORMAL
GLUCOSE BLD-MCNC: 188 MG/DL (ref 65–99)
GLUCOSE BLD-MCNC: 271 MG/DL (ref 65–99)
GLUCOSE BLD-MCNC: 317 MG/DL (ref 65–99)
GLUCOSE BLD-MCNC: 320 MG/DL (ref 65–99)
GRAM STN SPEC: ABNORMAL
GRAM STN SPEC: ABNORMAL
SIGNIFICANT IND 70042: ABNORMAL
SITE SITE: ABNORMAL
SOURCE SOURCE: ABNORMAL

## 2020-02-07 PROCEDURE — 160048 HCHG OR STATISTICAL LEVEL 1-5

## 2020-02-07 PROCEDURE — A9270 NON-COVERED ITEM OR SERVICE: HCPCS | Performed by: ANESTHESIOLOGY

## 2020-02-07 PROCEDURE — 770006 HCHG ROOM/CARE - MED/SURG/GYN SEMI*

## 2020-02-07 PROCEDURE — 501838 HCHG SUTURE GENERAL

## 2020-02-07 PROCEDURE — 97162 PT EVAL MOD COMPLEX 30 MIN: CPT

## 2020-02-07 PROCEDURE — 700102 HCHG RX REV CODE 250 W/ 637 OVERRIDE(OP): Performed by: ANESTHESIOLOGY

## 2020-02-07 PROCEDURE — 36415 COLL VENOUS BLD VENIPUNCTURE: CPT

## 2020-02-07 PROCEDURE — 99233 SBSQ HOSP IP/OBS HIGH 50: CPT | Mod: GC | Performed by: INTERNAL MEDICINE

## 2020-02-07 PROCEDURE — A9270 NON-COVERED ITEM OR SERVICE: HCPCS | Performed by: STUDENT IN AN ORGANIZED HEALTH CARE EDUCATION/TRAINING PROGRAM

## 2020-02-07 PROCEDURE — 700105 HCHG RX REV CODE 258: Performed by: ANESTHESIOLOGY

## 2020-02-07 PROCEDURE — 700111 HCHG RX REV CODE 636 W/ 250 OVERRIDE (IP): Performed by: STUDENT IN AN ORGANIZED HEALTH CARE EDUCATION/TRAINING PROGRAM

## 2020-02-07 PROCEDURE — 500881 HCHG PACK, EXTREMITY

## 2020-02-07 PROCEDURE — 700111 HCHG RX REV CODE 636 W/ 250 OVERRIDE (IP): Performed by: ANESTHESIOLOGY

## 2020-02-07 PROCEDURE — 700105 HCHG RX REV CODE 258: Performed by: STUDENT IN AN ORGANIZED HEALTH CARE EDUCATION/TRAINING PROGRAM

## 2020-02-07 PROCEDURE — 160009 HCHG ANES TIME/MIN

## 2020-02-07 PROCEDURE — 87040 BLOOD CULTURE FOR BACTERIA: CPT

## 2020-02-07 PROCEDURE — 0QBN0ZZ EXCISION OF RIGHT METATARSAL, OPEN APPROACH: ICD-10-PCS | Performed by: ORTHOPAEDIC SURGERY

## 2020-02-07 PROCEDURE — A6223 GAUZE >16<=48 NO W/SAL W/O B: HCPCS

## 2020-02-07 PROCEDURE — 160027 HCHG SURGERY MINUTES - 1ST 30 MINS LEVEL 2

## 2020-02-07 PROCEDURE — 700101 HCHG RX REV CODE 250: Performed by: ORTHOPAEDIC SURGERY

## 2020-02-07 PROCEDURE — 160002 HCHG RECOVERY MINUTES (STAT)

## 2020-02-07 PROCEDURE — 501330 HCHG SET, CYSTO IRRIG TUBING

## 2020-02-07 PROCEDURE — 700102 HCHG RX REV CODE 250 W/ 637 OVERRIDE(OP): Performed by: INTERNAL MEDICINE

## 2020-02-07 PROCEDURE — 160038 HCHG SURGERY MINUTES - EA ADDL 1 MIN LEVEL 2

## 2020-02-07 PROCEDURE — 82962 GLUCOSE BLOOD TEST: CPT

## 2020-02-07 PROCEDURE — 700102 HCHG RX REV CODE 250 W/ 637 OVERRIDE(OP): Performed by: STUDENT IN AN ORGANIZED HEALTH CARE EDUCATION/TRAINING PROGRAM

## 2020-02-07 PROCEDURE — 160035 HCHG PACU - 1ST 60 MINS PHASE I

## 2020-02-07 RX ORDER — DIPHENHYDRAMINE HYDROCHLORIDE 50 MG/ML
12.5 INJECTION INTRAMUSCULAR; INTRAVENOUS
Status: DISCONTINUED | OUTPATIENT
Start: 2020-02-07 | End: 2020-02-07 | Stop reason: HOSPADM

## 2020-02-07 RX ORDER — HYDRALAZINE HYDROCHLORIDE 20 MG/ML
5 INJECTION INTRAMUSCULAR; INTRAVENOUS
Status: DISCONTINUED | OUTPATIENT
Start: 2020-02-07 | End: 2020-02-07 | Stop reason: HOSPADM

## 2020-02-07 RX ORDER — HYDROMORPHONE HYDROCHLORIDE 1 MG/ML
0.4 INJECTION, SOLUTION INTRAMUSCULAR; INTRAVENOUS; SUBCUTANEOUS
Status: DISCONTINUED | OUTPATIENT
Start: 2020-02-07 | End: 2020-02-07 | Stop reason: HOSPADM

## 2020-02-07 RX ORDER — OXYCODONE HYDROCHLORIDE AND ACETAMINOPHEN 5; 325 MG/1; MG/1
1 TABLET ORAL
Status: COMPLETED | OUTPATIENT
Start: 2020-02-07 | End: 2020-02-07

## 2020-02-07 RX ORDER — HALOPERIDOL 5 MG/ML
1 INJECTION INTRAMUSCULAR
Status: DISCONTINUED | OUTPATIENT
Start: 2020-02-07 | End: 2020-02-07 | Stop reason: HOSPADM

## 2020-02-07 RX ORDER — CEFAZOLIN SODIUM 1 G/3ML
INJECTION, POWDER, FOR SOLUTION INTRAMUSCULAR; INTRAVENOUS PRN
Status: DISCONTINUED | OUTPATIENT
Start: 2020-02-07 | End: 2020-02-07 | Stop reason: SURG

## 2020-02-07 RX ORDER — DEXAMETHASONE SODIUM PHOSPHATE 4 MG/ML
INJECTION, SOLUTION INTRA-ARTICULAR; INTRALESIONAL; INTRAMUSCULAR; INTRAVENOUS; SOFT TISSUE PRN
Status: DISCONTINUED | OUTPATIENT
Start: 2020-02-07 | End: 2020-02-07 | Stop reason: SURG

## 2020-02-07 RX ORDER — BUPIVACAINE HYDROCHLORIDE 5 MG/ML
INJECTION, SOLUTION EPIDURAL; INTRACAUDAL
Status: DISCONTINUED | OUTPATIENT
Start: 2020-02-07 | End: 2020-02-07 | Stop reason: HOSPADM

## 2020-02-07 RX ORDER — HYDROMORPHONE HYDROCHLORIDE 1 MG/ML
0.1 INJECTION, SOLUTION INTRAMUSCULAR; INTRAVENOUS; SUBCUTANEOUS
Status: DISCONTINUED | OUTPATIENT
Start: 2020-02-07 | End: 2020-02-07 | Stop reason: HOSPADM

## 2020-02-07 RX ORDER — ONDANSETRON 2 MG/ML
INJECTION INTRAMUSCULAR; INTRAVENOUS PRN
Status: DISCONTINUED | OUTPATIENT
Start: 2020-02-07 | End: 2020-02-07 | Stop reason: SURG

## 2020-02-07 RX ORDER — HYDROMORPHONE HYDROCHLORIDE 1 MG/ML
0.2 INJECTION, SOLUTION INTRAMUSCULAR; INTRAVENOUS; SUBCUTANEOUS
Status: DISCONTINUED | OUTPATIENT
Start: 2020-02-07 | End: 2020-02-07 | Stop reason: HOSPADM

## 2020-02-07 RX ORDER — MORPHINE SULFATE 4 MG/ML
2 INJECTION, SOLUTION INTRAMUSCULAR; INTRAVENOUS EVERY 4 HOURS PRN
Status: DISCONTINUED | OUTPATIENT
Start: 2020-02-07 | End: 2020-02-14 | Stop reason: HOSPADM

## 2020-02-07 RX ORDER — LABETALOL HYDROCHLORIDE 5 MG/ML
5 INJECTION, SOLUTION INTRAVENOUS
Status: DISCONTINUED | OUTPATIENT
Start: 2020-02-07 | End: 2020-02-07 | Stop reason: HOSPADM

## 2020-02-07 RX ORDER — SODIUM CHLORIDE, SODIUM LACTATE, POTASSIUM CHLORIDE, CALCIUM CHLORIDE 600; 310; 30; 20 MG/100ML; MG/100ML; MG/100ML; MG/100ML
INJECTION, SOLUTION INTRAVENOUS
Status: DISCONTINUED | OUTPATIENT
Start: 2020-02-07 | End: 2020-02-07 | Stop reason: HOSPADM

## 2020-02-07 RX ORDER — SODIUM CHLORIDE, SODIUM LACTATE, POTASSIUM CHLORIDE, CALCIUM CHLORIDE 600; 310; 30; 20 MG/100ML; MG/100ML; MG/100ML; MG/100ML
INJECTION, SOLUTION INTRAVENOUS CONTINUOUS
Status: DISCONTINUED | OUTPATIENT
Start: 2020-02-07 | End: 2020-02-07 | Stop reason: HOSPADM

## 2020-02-07 RX ORDER — MIDAZOLAM HYDROCHLORIDE 1 MG/ML
INJECTION INTRAMUSCULAR; INTRAVENOUS PRN
Status: DISCONTINUED | OUTPATIENT
Start: 2020-02-07 | End: 2020-02-07 | Stop reason: SURG

## 2020-02-07 RX ORDER — METOPROLOL TARTRATE 1 MG/ML
1 INJECTION, SOLUTION INTRAVENOUS
Status: DISCONTINUED | OUTPATIENT
Start: 2020-02-07 | End: 2020-02-07 | Stop reason: HOSPADM

## 2020-02-07 RX ORDER — OXYCODONE HYDROCHLORIDE AND ACETAMINOPHEN 5; 325 MG/1; MG/1
2 TABLET ORAL
Status: COMPLETED | OUTPATIENT
Start: 2020-02-07 | End: 2020-02-07

## 2020-02-07 RX ADMIN — PROPOFOL 200 MG: 10 INJECTION, EMULSION INTRAVENOUS at 07:23

## 2020-02-07 RX ADMIN — CEFAZOLIN SODIUM 2 G: 2 INJECTION, SOLUTION INTRAVENOUS at 13:01

## 2020-02-07 RX ADMIN — ATORVASTATIN CALCIUM 40 MG: 40 TABLET, FILM COATED ORAL at 04:26

## 2020-02-07 RX ADMIN — HYDROCODONE BITARTRATE AND ACETAMINOPHEN 1 TABLET: 10; 325 TABLET ORAL at 20:00

## 2020-02-07 RX ADMIN — ACETAMINOPHEN 650 MG: 325 TABLET, FILM COATED ORAL at 15:03

## 2020-02-07 RX ADMIN — HYDROCODONE BITARTRATE AND ACETAMINOPHEN 1 TABLET: 10; 325 TABLET ORAL at 11:33

## 2020-02-07 RX ADMIN — FENTANYL CITRATE 100 MCG: 50 INJECTION, SOLUTION INTRAMUSCULAR; INTRAVENOUS at 07:23

## 2020-02-07 RX ADMIN — SODIUM CHLORIDE: 9 INJECTION, SOLUTION INTRAVENOUS at 20:02

## 2020-02-07 RX ADMIN — CEFAZOLIN SODIUM 2 G: 2 INJECTION, SOLUTION INTRAVENOUS at 22:59

## 2020-02-07 RX ADMIN — FENTANYL CITRATE 25 MCG: 0.05 INJECTION, SOLUTION INTRAMUSCULAR; INTRAVENOUS at 08:29

## 2020-02-07 RX ADMIN — DEXAMETHASONE SODIUM PHOSPHATE 4 MG: 4 INJECTION, SOLUTION INTRA-ARTICULAR; INTRALESIONAL; INTRAMUSCULAR; INTRAVENOUS; SOFT TISSUE at 07:13

## 2020-02-07 RX ADMIN — FENTANYL CITRATE 25 MCG: 50 INJECTION, SOLUTION INTRAMUSCULAR; INTRAVENOUS at 07:31

## 2020-02-07 RX ADMIN — INSULIN GLARGINE 10 UNITS: 100 INJECTION, SOLUTION SUBCUTANEOUS at 17:46

## 2020-02-07 RX ADMIN — FENTANYL CITRATE 25 MCG: 0.05 INJECTION, SOLUTION INTRAMUSCULAR; INTRAVENOUS at 08:17

## 2020-02-07 RX ADMIN — ONDANSETRON 4 MG: 2 INJECTION INTRAMUSCULAR; INTRAVENOUS at 07:31

## 2020-02-07 RX ADMIN — SENNOSIDES AND DOCUSATE SODIUM 2 TABLET: 8.6; 5 TABLET ORAL at 04:26

## 2020-02-07 RX ADMIN — OXYCODONE HYDROCHLORIDE AND ACETAMINOPHEN 1 TABLET: 5; 325 TABLET ORAL at 08:17

## 2020-02-07 RX ADMIN — MULTIPLE VITAMINS W/ MINERALS TAB 1 TABLET: TAB at 04:26

## 2020-02-07 RX ADMIN — SODIUM CHLORIDE, POTASSIUM CHLORIDE, SODIUM LACTATE AND CALCIUM CHLORIDE: 600; 310; 30; 20 INJECTION, SOLUTION INTRAVENOUS at 07:09

## 2020-02-07 RX ADMIN — CEFAZOLIN 2 G: 330 INJECTION, POWDER, FOR SOLUTION INTRAMUSCULAR; INTRAVENOUS at 07:09

## 2020-02-07 RX ADMIN — HYDROCODONE BITARTRATE AND ACETAMINOPHEN 1 TABLET: 10; 325 TABLET ORAL at 04:26

## 2020-02-07 RX ADMIN — MIDAZOLAM HYDROCHLORIDE 2 MG: 1 INJECTION, SOLUTION INTRAMUSCULAR; INTRAVENOUS at 07:09

## 2020-02-07 RX ADMIN — CEFAZOLIN SODIUM 2 G: 2 INJECTION, SOLUTION INTRAVENOUS at 04:26

## 2020-02-07 RX ADMIN — SODIUM CHLORIDE: 9 INJECTION, SOLUTION INTRAVENOUS at 11:33

## 2020-02-07 ASSESSMENT — GAIT ASSESSMENTS
DEVIATION: ANTALGIC;INCREASED BASE OF SUPPORT
GAIT LEVEL OF ASSIST: SUPERVISED
DISTANCE (FEET): 10
ASSISTIVE DEVICE: FRONT WHEEL WALKER

## 2020-02-07 ASSESSMENT — PAIN SCALES - GENERAL: PAIN_LEVEL: 0

## 2020-02-07 ASSESSMENT — COGNITIVE AND FUNCTIONAL STATUS - GENERAL
SUGGESTED CMS G CODE MODIFIER MOBILITY: CH
MOBILITY SCORE: 24

## 2020-02-07 NOTE — ANESTHESIA PREPROCEDURE EVALUATION
Relevant Problems   Other   (+) Osteomyelitis of great toe of right foot (HCC)       Physical Exam    Airway   Mallampati: II  TM distance: >3 FB  Neck ROM: full       Cardiovascular - normal exam  Rhythm: regular  Rate: normal  (-) murmur     Dental - normal exam         Pulmonary - normal exam  Breath sounds clear to auscultation     Abdominal    Neurological - normal exam                 Anesthesia Plan    ASA 3   ASA physical status 3 criteria: diabetes - poorly controlled    Plan - general       Airway plan will be LMA        Induction: intravenous    Postoperative Plan: Postoperative administration of opioids is intended.    Pertinent diagnostic labs and testing reviewed    Informed Consent:    Anesthetic plan and risks discussed with patient.    Use of blood products discussed with: patient whom consented to blood products.

## 2020-02-07 NOTE — PROGRESS NOTES
[facility] [place] [facility] PROGRESS NOTE     Attending: Dr. Silverio    Senior Resident: Dr. Lewis    Cj Resident: Dr. Clement    PATIENT: Rob Webster; [id number]; 1965 Hospital Day: 5    ID: 54 y.o. male PMH DM II admitted for R great toe swelling, erythema found to have abscess and proximal phalanx and distal 1st metatarsal osteomyelitis requiring ray amputation. POD #2    SUBJECTIVE: No acute events overnight.    Ortho with plans for OR again today for debridement, washout and possible closure.     Repeat blood cx (02/05) 1/2 pos for gram pos cocci, possible staph. Echo yesterday WNL, no valvular vegetations.     ID with recs for continued IV cefazolin x 6 weeks total, as well as for placement of PICC line once blood cx neg x 48 hrs (but blood cx continue to be pos).     OBJECTIVE:     Vitals:    02/06/20 0800 02/06/20 1600 02/06/20 2051 02/07/20 0413   BP: 129/81 136/81 152/85 140/90   Pulse: 95 100 98 86   Resp: 17 18 18 16   Temp: 36.5 °C (97.7 °F) 37 °C (98.6 °F) 36.9 °C (98.4 °F) 37 °C (98.6 °F)   TempSrc: Temporal Temporal Temporal Temporal   SpO2: 96% 95% 93% 95%   Weight:       Height:           Intake/Output Summary (Last 24 hours) at 2/7/2020 0622  Last data filed at 2/7/2020 0453  Gross per 24 hour   Intake 780 ml   Output 1650 ml   Net -870 ml       PE:  General: No acute distress, resting comfortably in bed.  HEENT: Normocephalic, atraumatic. EOMI. MMM  Cardiovascular: RRR with no M/R/G. Loud S2  Respiratory: Symmetrical chest. Clear to auscultation bilaterally with no wheezes, rales or rhonchi  Abdomen: soft, non-tender, non-distended no masses, +BS   EXT:  Moves all extremities. Clean dressing present on the right foot.   Neuro: non focal, sensation grossly intact    LABS:  Recent Labs     02/05/20  0042   WBC 12.5*   RBC 4.42*   HEMOGLOBIN 12.3*   HEMATOCRIT 36.7*   MCV 83.0   MCH 27.8   RDW 37.6   PLATELETCT 348   MPV 9.1   NEUTSPOLYS 75.10*   LYMPHOCYTES 10.00*   MONOCYTES 10.90    EOSINOPHILS 1.30   BASOPHILS 0.50     Recent Labs     02/05/20  0042 02/06/20  0211   SODIUM 133* 132*   POTASSIUM 3.6 3.7   CHLORIDE 99 99   CO2 25 25   BUN 8 9   CREATININE 0.58 0.72   CALCIUM 8.8 8.0*   MAGNESIUM 2.2  --      Estimated GFR/CRCL = Estimated Creatinine Clearance: 112 mL/min (by C-G formula based on SCr of 0.72 mg/dL).  Recent Labs     02/05/20  0042  02/06/20  0211  02/06/20  1751 02/06/20  2300 02/07/20  0555   GLUCOSE 217*  --  187*  --   --   --   --    POCGLUCOSE  --    < >  --    < > 226* 256* 188*    < > = values in this interval not displayed.                 No results for input(s): INR, APTT, FIBRINOGEN in the last 72 hours.    Invalid input(s): DIMER    MICROBIOLOGY: repeat blood cx (02/05) 1/2 pos for gram pos cocci, possible staph      IMAGING:   EC-ECHOCARDIOGRAM COMPLETE W/O CONT   Final Result      MR-FOOT-WITH & W/O RIGHT   Final Result      First metatarsal and proximal phalanx osteomyelitis with possible necrosis. First metatarsal-phalangeal effusion from septic arthropathy is not seen, indicating the joint fluid is likely draining through the dorsal soft tissues      Dorsal lateral great toe skin ulceration with probable draining sinus and underlying necrotic tissue interposed between the first and second metatarsal shafts, wrapping under the plantar aspect of the proximal first metatarsal shaft      Medial forefoot cellulitis, myositis      Mild midfoot osteoarthritis      US-RENAL ARTERY DUPLEX COMP   Final Result      DX-FOOT-COMPLETE 3+ RIGHT   Final Result      Mildly increased soft tissue swelling.      Periarticular erosion most conspicuous about the great toe proximal phalanx which could represent osteomyelitis end your septic arthritis. MRI without and with contrast is recommended for further evaluation.            MEDS:  Current Facility-Administered Medications   Medication Last Dose   • NS infusion     • [MAR Hold] ceFAZolin in dextrose (ANCEF) IVPB premix 2 g  Stopped at 02/07/20 0456   • [MAR Hold] insulin glargine (LANTUS) injection 10 Units 10 Units at 02/06/20 1753    And   • [MAR Hold] insulin lispro (HUMALOG) injection 1-6 Units Stopped at 02/07/20 0600    And   • [MAR Hold] glucose 4 g chewable tablet 16 g      And   • [MAR Hold] DEXTROSE 10% BOLUS 250 mL     • [MAR Hold] senna-docusate (PERICOLACE or SENOKOT S) 8.6-50 MG per tablet 2 Tab 2 Tab at 02/07/20 0426    And   • [MAR Hold] polyethylene glycol/lytes (MIRALAX) PACKET 1 Packet 1 Packet at 02/04/20 2038    And   • [MAR Hold] magnesium hydroxide (MILK OF MAGNESIA) suspension 30 mL      And   • [MAR Hold] bisacodyl (DULCOLAX) suppository 10 mg     • [MAR Hold] acetaminophen (TYLENOL) tablet 650 mg 650 mg at 02/06/20 0413   • [MAR Hold] atorvastatin (LIPITOR) tablet 40 mg 40 mg at 02/07/20 0426   • [MAR Hold] therapeutic multivitamin-minerals (THERAGRAN-M) tablet 1 Tab 1 Tab at 02/07/20 0426   • [MAR Hold] HYDROcodone/acetaminophen (NORCO)  MG per tablet 1 Tab 1 Tab at 02/07/20 0426       PROBLEM LIST:  No problems updated.    ASSESSMENT/PLAN:   54 y.o. male with PMH DM II admitted for R great toe swelling, erythema found to have abscess and proximal 1st phalanx and distal 1st metatarsal osteomyelitis requiring ray amputation (POD #2). Now with MSSA bacteremia.         #Osteomyelitis of R great toe  #Abscess distal/ medial right foot  #Last tetanus vaccine 12 years prior  2-wk hx edema without known inciting skin break. Seen by ED 2 weeks earlier and x-ray without acute fracture, sent home with walking boot. Upon arrival to ED this visit, presentation consistent with infection. Xray with evidence of osteomyelitis. Afebrile. S/p tetanus vaccine here in hospital. ED with WBC 13.2 and neutrophilia, ESR 93, CRP 24. Subsequent MRI with evidence of osteo (prox phalanx, distal metatarsal). POD #2 s/p ray amputation. ID with recs for IV cefazolin x 6 weeks minimum.   - Continue ancef, per ID will likely need 6  weeks IV abx  - Tylenol, norco PRN for pain  - Ortho plans to return to OR today for additional washout and hopefully closure  - Has been NPO since midnight  - Continue to f/u blood cultures/wound cultures  - Will need PT/OT eval after surgery tomorrow  - OK to heel weight bear per Ortho  - Diabetic shoe delivered     #MSSA Bacteremia  1 of 2 blood culture bottles positive for MSSA, pan sensitive; repeat blood culture 02/05 with prelim growth of gram pos cocci, possibly staph. Note that these cultures were drawn before source control.   ID with recs for ancef x 6 weeks.  Echo 02/06 WNL and no vegetations.   Also recommending echocardiogram and repeat blood cultures which are ordered and pending. Appreciated their recommendations.  - Continue ancef as above  - Contact ID today to determine whether ancef adequate in light of pos repeat blood cx  - Repeat blood cultures again today     #NIDDM  A1c 6.9 in Jan 2020. Metformin 500 BID at home. Currently lantus 10 U qHS, and ISS  Sugars well controlled with 8 U SS coverage required  - Will follow insulin needs while eating and adjust as needed  - Likely restart metformin after surgery today     #HLD  - Continue home atorvastatin 40 qD     Lines: PIV  IVF: maintenance while NPO  Abx: Ancef  GI PPx: multimodal; last BM 02/06  DVT PPx: SCDs  Code: full  Diet: NPO for OR; to resume diabetic diet thereafter        Dispo: Inpatient for additional surgery, IV abx    Michelle Clement MD, PGY1

## 2020-02-07 NOTE — ANESTHESIA PROCEDURE NOTES
Airway  Date/Time: 2/7/2020 7:23 AM  Performed by: Arnold Wiley D.O.  Authorized by: Arnold Wiley D.O.     Location:  OR  Urgency:  Elective  Indications for Airway Management:  Anesthesia  Spontaneous Ventilation: absent    Sedation Level:  Deep  Preoxygenated: Yes    Mask Difficulty Assessment:  1 - vent by mask  Final Airway Type:  Supraglottic airway  Final Supraglottic Airway:  Standard LMA  SGA Size:  5  Cuff Pressure (cm H2O):  30  Number of Attempts at Approach:  1

## 2020-02-07 NOTE — PROGRESS NOTES
Right foot osteomyelitis with grossly infected wound  Plan for OR this morning for wound irrigation and debridement, possible wound closure and possible wound vacuum application  NPO  Surgery, risks, benefits discussed with the patient      Juan Randall MD

## 2020-02-07 NOTE — THERAPY
consult received; noted 6 clicks of 24 and mobilizing with nsg; needs IV Abx per chart and awaiting I&D tomorrow; will assess post I&D as needed/appropriate

## 2020-02-07 NOTE — OP REPORT
DATE OF SERVICE:  02/07/2020    SERVICE:  Orthopedic surgery.    PREOPERATIVE DIAGNOSIS:  Right foot osteomyelitis, status post irrigation and   debridement and open wound VAC.    POSTOPERATIVE DIAGNOSIS:  Right foot osteomyelitis, status post irrigation and   debridement and open wound VAC.    PROCEDURES PERFORMED:  1.  Right foot irrigation and debridement.  2.  Right foot wound closure.    COMPLICATIONS:  None.    SPECIMENS:  None.    TOURNIQUET TIME:  Zero.    FINDINGS:  No gross purulence within the wound, with a small amount of   devitalized tissue.    INDICATION FOR PROCEDURE:  The patient is a 54-year-old male with a previous   staged amputation and open wound VAC on 02/05/2020 for severe infection of the   distal aspect of the metatarsal head as well as proximal phalanx with open   wound and draining sinus tract.  At the time of surgery, it was determined   that the wound was too grossly infected for attempted closure and he was wound   VAC for 2 days in order for improved granulation tissue and approved eventual   healing.  Operative and nonoperative treatment was discussed with the   patient, he elected for operative treatment.  Risks of procedure were   discussed with the patient, which include but not limited to bleeding,   infection, damage to surrounding nerves, tendons, ligaments or other   structures, risk for future or revision surgery including higher level of   amputation.  The patient wished to proceed and the surgical consent forms were   signed.    DESCRIPTION OF PROCEDURE:  On the day of surgery, the patient was met in   preoperative area where the operative extremity was identified by myself and   marked with my initials after confirming with the patient.  The patient was   then brought back to the operating room and placed supine on the operating   table.  All bony prominences were well padded.  General endotracheal   anesthesia was then undertaken.  The patient was continued on his  preoperative   systemic antibiotics.  A multidisciplinary time-out was then performed to   confirm the correct site, correct patient, and correct procedure.  Once all   were in agreement, the right lower extremity wound was opened.  At the time of   prep, the wound VAC was removed.  The wound was inspected and was sharply   debrided with the use of the scissors, knife, and bluntly with a curette, all   devitalized tissue throughout the remainder of the wound bed.  This was done   to the level of the skin, subcutaneous tissue, tendon, bone.  There was   healthy granulation and bleeding tissue within the wound bed itself.  Once   this was done circumferentially around the remainder of the first metatarsal   amputation site as well as the wound bed and soft tissues, copious irrigation   was then performed.  Any remaining devitalized tissue was removed with the use   of a rongeur.  This was followed by again another 2 liters of copious   irrigation of normal saline.  Following this, it was determined that we will   be able to close the wound primarily.  This was then closed with 4 interrupted   deep buried 2-0 Prolene sutures to approximate the skin followed by   interrupted 3-0 nylon suture for skin closure.  Sterile dressings were   applied.  The patient was then awoken from anesthesia, moved on to the   postoperative gurney off the bed after extubation and to PACU in stable   condition.    POSTOPERATIVE PLAN:  He will be continued on his antibiotics per infectious   disease recommendations, which is to include 6 weeks course of IV antibiotics   given the gross infection as well as osteomyelitis.  We will have a dressing   change in 2 days and we will continue to watch him carefully in the   postoperative recovery period.       ____________________________________     MD JULISSA Sanchez / YASH    DD:  02/07/2020 07:56:58  DT:  02/07/2020 09:32:40    D#:  [id number]  Job#:  [id number]

## 2020-02-07 NOTE — PROGRESS NOTES
Infectious disease progres note    Reason for Consult:  Asked by Dr James Silverio M.D. to see this patient with left first toe osteomyelitis and MSSA bacteremia  Patient's PCP: Galilea Reyes M.D.    CC:   Chief Complaint   Patient presents with   • Foot Pain     R; recent visit for similar issue - worsening pain. Wearing fracture boots as educated prior.       HPI:  54-year-old male with a past medical history of diabetes type 2 and recent first right toe traumatic injury for which she presented to emergency department on 1/20/2020 presented to emergency department on 2/3/2020 with a 2-week history of worsening right first toe inflammation, erythema and pain.  At the emergency department patient was noted for leukocytosis with WBC at 13 2.  Inflammatory markers were also elevated.  X-ray tissue swelling and pus patient was admitted to surgical floor and started on a vancomycin and Unasyn antibiotic regimen.  Follow-up right foot MRI showing showed first metatarsal and proximal phalanx osteomyelitis with possible necrosis. 2/3/2020 blood cultures grew MSSA.  LPS/orthopedic surgery following case for which patient is scheduled for amputation.  Infectious disease was consulted for recommendations regarding MSSA bacteremia work-up and antibiotic recommendation.    Interval events  2/6/2020 - Patient evaluated at bedside and found AAOX4, afebrile and in NAD. Reports some amputation site pain overnight that resolved. No chest pain, dyspnea or palpitations. Repeat blood clx w/NGTD.      2/7/2020 -patient evaluated bedside on comfortable in no acute distress.  Patient status post or today.  Patient feeling well less pain from surgical site.  No constitutional symptoms, chest pain, palpitations or dyspnea.  2/5/2020 blood and OR bone culture growing MSSA.  Continue 6-week Ancef antibiotic regimen.      Medications / Drug list prior to admission:  No current facility-administered medications on file prior to encounter.       Current Outpatient Medications on File Prior to Encounter   Medication Sig Dispense Refill   • naproxen (NAPROSYN) 500 MG Tab Take 500 mg by mouth 2 times a day, with meals.     • therapeutic multivitamin-minerals (THERAGRAN-M) Tab Take 1 Tab by mouth every day.     • atorvastatin (LIPITOR) 40 MG Tab Take 40 mg by mouth every day.     • metFORMIN (GLUCOPHAGE) 500 MG Tab Take 500 mg by mouth 2 Times a Day.         Current list of administered Medications:    Current Facility-Administered Medications:   •  morphine (pf) 4 MG/ML injection 2 mg, 2 mg, Intravenous, Q4HRS PRN, Nola Lewis M.D.  •  NS infusion, , Intravenous, Continuous, Nola Lewis M.D., Last Rate: 125 mL/hr at 02/07/20 1133  •  ceFAZolin in dextrose (ANCEF) IVPB premix 2 g, 2 g, Intravenous, Q8HRS, Nola Lewis M.D., Stopped at 02/07/20 1331  •  insulin glargine (LANTUS) injection 10 Units, 10 Units, Subcutaneous, Q EVENING, 10 Units at 02/06/20 1753 **AND** insulin lispro (HUMALOG) injection 1-6 Units, 1-6 Units, Subcutaneous, Q6HRS, 4 Units at 02/07/20 1129 **AND** Accu-Chek Q6 if NPO, , , Q6H **AND** NOTIFY MD and PharmD, , , Once **AND** glucose 4 g chewable tablet 16 g, 16 g, Oral, Q15 MIN PRN **AND** DEXTROSE 10% BOLUS 250 mL, 250 mL, Intravenous, Q15 MIN PRN, Bi Caceres M.D.  •  senna-docusate (PERICOLACE or SENOKOT S) 8.6-50 MG per tablet 2 Tab, 2 Tab, Oral, BID, 2 Tab at 02/07/20 0426 **AND** polyethylene glycol/lytes (MIRALAX) PACKET 1 Packet, 1 Packet, Oral, QDAY PRN, 1 Packet at 02/04/20 2038 **AND** magnesium hydroxide (MILK OF MAGNESIA) suspension 30 mL, 30 mL, Oral, QDAY PRN **AND** bisacodyl (DULCOLAX) suppository 10 mg, 10 mg, Rectal, QDAY PRN, Ad Grande M.D.  •  acetaminophen (TYLENOL) tablet 650 mg, 650 mg, Oral, Q6HRS PRN, Ad Grande M.D., 650 mg at 02/07/20 1503  •  atorvastatin (LIPITOR) tablet 40 mg, 40 mg, Oral, DAILY, Radha Hedrick M.D., 40 mg at 02/07/20 0426  •  therapeutic multivitamin-minerals  (THERAGRAN-M) tablet 1 Tab, 1 Tab, Oral, DAILY, Radha Hedrick M.D., 1 Tab at 02/07/20 0426  •  HYDROcodone/acetaminophen (NORCO)  MG per tablet 1 Tab, 1 Tab, Oral, Q6HRS PRN, Ad Grande M.D., 1 Tab at 02/07/20 1133    Past Medical History:   Diagnosis Date   • Glaucoma 5/23/2012       Past Surgical History:   Procedure Laterality Date   • INCISION AND DRAINAGE ORTHOPEDIC Right 2/7/2020    Procedure: INCISION AND DRAINAGE, WOUND, BY ORTHOPEDICS;  Surgeon: Juan Randall;  Location: SURGERY [facility];  Service: Orthopedics   • WOUND CLOSURE ORTHO Right 2/7/2020    Procedure: CLOSURE, WOUND, ORTHO;  Surgeon: Juan Randall;  Location: SURGERY [facility];  Service: Orthopedics   • IRRIGATION & DEBRIDEMENT ORTHO Right 2/5/2020    Procedure: IRRIGATION AND DEBRIDEMENT, With  WOUND VAC;  Surgeon: Juan Randall;  Location: SURGERY [facility];  Service: Orthopedics   • TOE AMPUTATION Right 2/5/2020    Procedure: AMPUTATION, TOE- RIGHT GREAT TOE;  Surgeon: Juan Randall;  Location: SURGERY [facility];  Service: Orthopedics       Family History   Problem Relation Age of Onset   • Cancer Neg Hx    • Diabetes Neg Hx    • Heart Disease Neg Hx    • Hypertension Neg Hx      Patient family history was personally reviewed, no pertinent family history to current presentation    Social History     Tobacco Use   • Smoking status: Never Smoker   • Smokeless tobacco: Never Used   Substance Use Topics   • Alcohol use: Not Currently     Comment: no ETOH x17 years   • Drug use: Never       ALLERGIES:  No Known Allergies    Review of systems:  A complete review of symptoms was reviewed with patient. This is reviewed in H&P and PMH. ALL OTHERS reviewed and negative    Physical exam:  Patient Vitals for the past 24 hrs:   BP Temp Temp src Pulse Resp SpO2 Height Weight   02/05/20 1502 (!) 163/90 37.1 °C (98.7 °F) Temporal (!) 105 16 96 % -- --   02/05/20 0800 (!) 161/87 36.7 °C (98.1  "°F) Temporal 94 17 97 % 1.6 m (5' 2.99\") 79.6 kg (175 lb 7.8 oz)   02/05/20 0500 158/89 36.3 °C (97.4 °F) Temporal 93 17 97 % -- --   02/04/20 2100 (!) 169/93 36.9 °C (98.4 °F) Temporal (!) 103 17 96 % -- --   02/04/20 1700 139/85 37.2 °C (98.9 °F) Temporal 83 17 96 % -- --     General: No acute distress.   EYES: no jaundice  HEENT: OP clear   Neck: No bruits No JVD.   CVS: RRR. S1 + S2. No M/R/G  Resp: CTAB. No wheezing or crackles/rhonchi.  Abdomen: Soft, NT, ND,  Skin: Grossly nothing acute no obvious rashes  Neurological: Alert, Moves all extremities, no cranial nerve defects on limited exam  Extremities: Right foot covered with wound vac system    Data:  Laboratory studies personally reviewed by me:  Recent Results (from the past 24 hour(s))   ACCU-CHEK GLUCOSE    Collection Time: 02/06/20  5:51 PM   Result Value Ref Range    Glucose - Accu-Ck 226 (H) 65 - 99 mg/dL   ACCU-CHEK GLUCOSE    Collection Time: 02/06/20 11:00 PM   Result Value Ref Range    Glucose - Accu-Ck 256 (H) 65 - 99 mg/dL   ACCU-CHEK GLUCOSE    Collection Time: 02/07/20  5:55 AM   Result Value Ref Range    Glucose - Accu-Ck 188 (H) 65 - 99 mg/dL   ACCU-CHEK GLUCOSE    Collection Time: 02/07/20 11:28 AM   Result Value Ref Range    Glucose - Accu-Ck 320 (H) 65 - 99 mg/dL       Imaging:  EC-ECHOCARDIOGRAM COMPLETE W/O CONT   Final Result      MR-FOOT-WITH & W/O RIGHT   Final Result      First metatarsal and proximal phalanx osteomyelitis with possible necrosis. First metatarsal-phalangeal effusion from septic arthropathy is not seen, indicating the joint fluid is likely draining through the dorsal soft tissues      Dorsal lateral great toe skin ulceration with probable draining sinus and underlying necrotic tissue interposed between the first and second metatarsal shafts, wrapping under the plantar aspect of the proximal first metatarsal shaft      Medial forefoot cellulitis, myositis      Mild midfoot osteoarthritis      US-RENAL ARTERY DUPLEX " COMP   Final Result      DX-FOOT-COMPLETE 3+ RIGHT   Final Result      Mildly increased soft tissue swelling.      Periarticular erosion most conspicuous about the great toe proximal phalanx which could represent osteomyelitis end your septic arthritis. MRI without and with contrast is recommended for further evaluation.          Active Problems:    Cellulitis of toe, right POA: Unknown    DM (diabetes mellitus) (HCC) POA: Unknown    Hyperlipidemia POA: Unknown    Osteomyelitis of great toe of right foot (HCC) POA: Unknown  Resolved Problems:    * No resolved hospital problems. *      Assessment / Plan:  #Right first toe osteomyelitis  #MSSA bacteremia  #DM2  Presenting with a 2-week history of worsening first toe inflammation  MSSA osteomyelitis likely secondary to skin tear from injury  Leukocytosis trending down slowly  Right foot x-ray showed mildly increased soft tissue swelling, possible osteomyelitis  Right foot MRI showed first metatarsal and proximal phalanx osteomyelitis with possible necrosis  2/3/2020 Blood cultures grew MSSA   2/5/2020 Blood cultures grew MSSA  2/5/2020 right big toe bone culture grew MSSA  TTE normal  Orthopedic following case, S/P OR right first toe amputation 2/5/2020  Continue IV Cefazolin 2g TID (started on 2/5/2020, likely 6-week regimen)   Repeat blood cultures      Thank you for the consult, infectious disease will continue to follow along    It is my pleasure to participate in the care of Mr. Rome Webster.  Please do not hesitate to contact me with questions or concerns.    Please note that this dictation was created using voice recognition software. I have worked with consultants from the vendor as well as technical experts from [facility] to optimize the interface. I have made every reasonable attempt to correct obvious errors, but I expect that there are errors of grammar and possibly content I did not discover before finalizing the note.

## 2020-02-07 NOTE — ANESTHESIA TIME REPORT
Anesthesia Start and Stop Event Times     Date Time Event    2/7/2020 0709 Anesthesia Start     0802 Anesthesia Stop        Responsible Staff  02/07/20    Name Role Begin End    Arnold Wiley D.O. Anesth 0709 0802        Preop Diagnosis (Free Text):  Pre-op Diagnosis     Right foot osteomyelitis        Preop Diagnosis (Codes):    Post op Diagnosis  Acute osteomyelitis of right foot (HCC)      Premium Reason  Non-Premium    Comments:

## 2020-02-07 NOTE — OP REPORT
POST-OP NOTE FOR LIMB PRESERVATION SERVICE    SURGERY DATE: 2/7/2020    PROCEDURE: Procedure(s):  INCISION AND DRAINAGE, WOUND, BY ORTHOPEDICS - Wound Class: Dirty or Infected  CLOSURE, WOUND, ORTHO - Wound Class: Dirty or Infected    WEIGHT BEARING STATUS: Heel weight bearing    PT CONSULT: Yes    ANTIBIOTICS: Per ID recommendation    PLAN TO RETURN TO O.R.: No    WOUND CARE PLAN: Incisional dressing - Change POD #2 (Directions: Adaptic over incision, Gauze, Roll Gauze, Ace Wrap)    FOLLOW-UP: LPS rounds in Wound Clinic    DURABLE MEDICAL EQUIPMENT: Offloading boot  when ambulating    OTHER:       Juan Randall M.D.

## 2020-02-07 NOTE — THERAPY
"Physical Therapy Evaluation completed.     Bed Mobility:  Supine to Sit: Supervised  Transfers: Sit to Stand: Supervised with FWW   Gait: Level Of Assist: Supervised with Front-Wheel Walker for 10' with 2 turns maintaining heel weight-bearing precaution on R foot with max VCs       Plan of Care: Will benefit from Physical Therapy 5 times per week  Discharge Recommendations: Equipment: Front-Wheel Walker. Recommend post-acute placement for continued physical therapy services prior to discharge home.         Pt is 60yo male s/p I&D on R 1st ray secondary to distal 1st metatarsal osteomyelitis with current heel weight-bearing status on R LE and offloading boot when ambulating. Pt has hx of diabetes. Pt presents upon Eval with decreased functional mobility, pain, decreased activity tolerance, and poor safety awareness limiting pt from returning home at this time and participating in ADLs as PLOF. Pt needs constant reinforcement of weight-bearing precautions, using AD appropriately, and donning/doffing boot correctly and is recommended for post acute services at this time; however, pt will be followed for PT services at this time to assess changes in functional status with ambulation/stair training for potential d/c home.     See \"Rehab Therapy-Acute\" Patient Summary Report for complete documentation.     "

## 2020-02-07 NOTE — NON-PROVIDER
[facility] PROGRESS NOTE     Attending: Dr. Silverio  Senior Resident: Dr. Lewis  Cj Resident: Dr. Clement  PATIENT: Rob Webster; [id number]; 1965, Hospital day 5    Subjective:     Pt s/p repeat I & D with wound closure this AM. Pt in good spirits and reports minimal pain.   Pt was given Norco this morning.  Pt was administered scheduled cefazolin this AM.    OBJECTIVE:  Temp:  [36.5 °C (97.7 °F)-37 °C (98.6 °F)] 37 °C (98.6 °F)  Pulse:  [] 86  Resp:  [16-18] 16  BP: (129-152)/(81-90) 140/90  SpO2:  [93 %-96 %] 95 %    Intake/Output Summary (Last 24 hours) at 2/6/2020 0615  Last data filed at 2/6/2020 0438  Gross per 24 hour   Intake 1580 ml   Output 2595 ml   Net -1015 ml       PE:  N/A    LABS:  Recent Labs     02/05/20 0042   WBC 12.5*   RBC 4.42*   HEMOGLOBIN 12.3*   HEMATOCRIT 36.7*   MCV 83.0   MCH 27.8   RDW 37.6   PLATELETCT 348   MPV 9.1   NEUTSPOLYS 75.10*   LYMPHOCYTES 10.00*   MONOCYTES 10.90   EOSINOPHILS 1.30   BASOPHILS 0.50     Recent Labs     02/05/20 0042 02/06/20  0211   SODIUM 133* 132*   POTASSIUM 3.6 3.7   CHLORIDE 99 99   CO2 25 25   BUN 8 9   CREATININE 0.58 0.72   CALCIUM 8.8 8.0*   MAGNESIUM 2.2  --      Estimated GFR/CRCL = Estimated Creatinine Clearance: 112 mL/min (by C-G formula based on SCr of 0.72 mg/dL).  Recent Labs     02/05/20  0042  02/06/20  0211  02/06/20  1751 02/06/20  2300 02/07/20  0555   GLUCOSE 217*  --  187*  --   --   --   --    POCGLUCOSE  --    < >  --    < > 226* 256* 188*    < > = values in this interval not displayed.                 No results for input(s): INR, APTT, FIBRINOGEN in the last 72 hours.    Invalid input(s): DIMER    MICROBIOLOGY:     Contains abnormal data BLOOD CULTURE   Order: [id number]   Status:  Preliminary result   Visible to patient:  No (Not Released) Next appt:  None   Specimen Information: Peripheral; Blood        Component 2d ago   Significant Indicator POSPositive (POS) P   Source BLD P   Site  "PERIPHERAL P   Culture Result Abnormal    Growth detected by Bactec instrument. 02/06/2020  18:16   Gram Stain: Gram positive cocci: Possible Staphylococcus sp.   P      Resulting Agency M      Narrative   Performed by: M   CALL  [facility]  131 tel. [phone],  CALLED  131 tel. [phone] 02/06/2020, 18:20, RB PERF. RESULTS CALLED  TO:[id number]  Per Hospital Policy: Only change Specimen Src: to \"Line\" if  specified by physician order.  Left Forearm/Arm                 Procedure Component Value Units Date/Time     BLOOD CULTURE x2 [[id number]]  (Abnormal)  (Susceptibility) Collected:  02/03/20 1632     Order Status:  Completed Specimen:  Blood from Peripheral Updated:  02/06/20 0835       Significant Indicator POS       Source BLD       Site PERIPHERAL       Culture Result Growth detected by Bactec instrument. 02/04/2020  19:13  Staphylococcus aureus (methicillin sensitive)  detected by PCR.           Staphylococcus aureus     Narrative:        CALL  [facility]  131 tel. [phone],  CALLED  131 tel. [phone] 02/04/2020, 19:16, RB PERF. RESULTS CALLED TO: RN  [phone]  Per Hospital Policy: Only change Specimen Src: to \"Line\" if  specified by physician order.  Right Hand          Susceptibility              Staphylococcus aureus (1)              Antibiotic Interpretation Method Status      Azithromycin Sensitive BELEN Final      Clindamycin Sensitive BELEN Final      Cefazolin Sensitive BELEN Final      Ceftaroline Sensitive BELEN Final      Daptomycin Sensitive BELEN Final      Ampicillin/sulbactam Sensitive BELEN Final      Erythromycin Sensitive BELEN Final      Vancomycin Sensitive BELEN Final      Oxacillin Sensitive BELEN Final      Pip/Tazobactam Sensitive BELEN Final      Trimeth/Sulfa Sensitive BELEN Final      Tetracycline Sensitive BELEN Final                        BLOOD CULTURE [[id number]] Collected:  02/05/20 1655     Order Status:  Completed Specimen:  Blood from Peripheral Updated:  02/06/20 0712       Significant Indicator NEG       " "Source BLD       Site PERIPHERAL       Culture Result No Growth  Note: Blood cultures are incubated for 5 days and  are monitored continuously.Positive blood cultures  are called to the RN and reported as soon as  they are identified.        Narrative:        Per Hospital Policy: Only change Specimen Src: to \"Line\" if  specified by physician order.  Left Hand     BLOOD CULTURE [[id number]] Collected:  02/05/20 1655     Order Status:  Completed Specimen:  Blood from Peripheral Updated:  02/06/20 0712       Significant Indicator NEG       Source BLD       Site PERIPHERAL       Culture Result No Growth  Note: Blood cultures are incubated for 5 days and  are monitored continuously.Positive blood cultures  are called to the RN and reported as soon as  they are identified.        Narrative:        Per Hospital Policy: Only change Specimen Src: to \"Line\" if  specified by physician order.  Left Forearm/Arm     GRAM STAIN [[id number]] Collected:  02/05/20 1555     Order Status:  Completed Specimen:  Wound Updated:  02/05/20 2041       Significant Indicator .       Source WND       Site Right Great Toe Deep       Gram Stain Result Moderate WBCs.  Few Gram positive cocci.        Narrative:        Surgery - swabs received     GRAM STAIN [[id number]] Collected:  02/05/20 1618     Order Status:  Completed Specimen:  Wound Updated:  02/05/20 2040       Significant Indicator .       Source WND       Site Right Bone Toe Biopsy       Gram Stain Result Rare WBCs.  Few Gram positive cocci.        CULTURE WOUND W/ GRAM STAIN [[id number]] Collected:  02/05/20 1618     Order Status:  Completed Specimen:  Other Updated:  02/05/20 1654     Anaerobic Culture [[id number]] Collected:  02/05/20 1618     Order Status:  Completed Specimen:  Other Updated:  02/05/20 1654     Anaerobic Culture [[id number]] Collected:  02/05/20 1555     Order Status:  Completed Specimen:  Other Updated:  02/05/20 1653     CULTURE WOUND W/ GRAM STAIN [[id number]] Collected: " " 02/05/20 1555     Order Status:  Completed Specimen:  Other Updated:  02/05/20 1653     BLOOD CULTURE x2 [[id number]] Collected:  02/03/20 1632     Order Status:  Completed Specimen:  Blood from Peripheral Updated:  02/04/20 0637       Significant Indicator NEG       Source BLD       Site PERIPHERAL       Culture Result No Growth  Note: Blood cultures are incubated for 5 days and  are monitored continuously.Positive blood cultures  are called to the RN and reported as soon as  they are identified.        Narrative:        Per Hospital Policy: Only change Specimen Src: to \"Line\" if  specified by physician order.  Right AC     CULTURE WOUND W/ GRAM STAIN [[id number]]       Order Status:  No result Specimen:  Wound from Toe                   IMAGING:   EC-ECHOCARDIOGRAM COMPLETE W/O CONT   Final Result      MR-FOOT-WITH & W/O RIGHT   Final Result      First metatarsal and proximal phalanx osteomyelitis with possible necrosis. First metatarsal-phalangeal effusion from septic arthropathy is not seen, indicating the joint fluid is likely draining through the dorsal soft tissues      Dorsal lateral great toe skin ulceration with probable draining sinus and underlying necrotic tissue interposed between the first and second metatarsal shafts, wrapping under the plantar aspect of the proximal first metatarsal shaft      Medial forefoot cellulitis, myositis      Mild midfoot osteoarthritis      US-RENAL ARTERY DUPLEX COMP   Final Result      DX-FOOT-COMPLETE 3+ RIGHT   Final Result      Mildly increased soft tissue swelling.      Periarticular erosion most conspicuous about the great toe proximal phalanx which could represent osteomyelitis end your septic arthritis. MRI without and with contrast is recommended for further evaluation.          MEDS:  Current Facility-Administered Medications   Medication Last Dose   • NS infusion     • [MAR Hold] ceFAZolin in dextrose (ANCEF) IVPB premix 2 g Stopped at 02/07/20 0456   • [MAR " Hold] insulin glargine (LANTUS) injection 10 Units 10 Units at 02/06/20 1753    And   • [MAR Hold] insulin lispro (HUMALOG) injection 1-6 Units Stopped at 02/07/20 0600    And   • [MAR Hold] glucose 4 g chewable tablet 16 g      And   • [MAR Hold] DEXTROSE 10% BOLUS 250 mL     • [MAR Hold] senna-docusate (PERICOLACE or SENOKOT S) 8.6-50 MG per tablet 2 Tab 2 Tab at 02/07/20 0426    And   • [MAR Hold] polyethylene glycol/lytes (MIRALAX) PACKET 1 Packet 1 Packet at 02/04/20 2038    And   • [MAR Hold] magnesium hydroxide (MILK OF MAGNESIA) suspension 30 mL      And   • [MAR Hold] bisacodyl (DULCOLAX) suppository 10 mg     • [MAR Hold] acetaminophen (TYLENOL) tablet 650 mg 650 mg at 02/06/20 0413   • [MAR Hold] atorvastatin (LIPITOR) tablet 40 mg 40 mg at 02/07/20 0426   • [MAR Hold] therapeutic multivitamin-minerals (THERAGRAN-M) tablet 1 Tab 1 Tab at 02/07/20 0426   • [MAR Hold] HYDROcodone/acetaminophen (NORCO)  MG per tablet 1 Tab 1 Tab at 02/07/20 0426       PROBLEM LIST:  No problems updated.    ASSESSMENT/PLAN: 54 y.o. male admitted for     # Osteomyelitis  # Bacteremia  - S/p Irrigation and drainage 2/5/2020 with wound vac assistance  - Repeat I & D this morning with wound closure  - Cultures show MSSA, continue cefazolin  - Pain management with [facility] PRN    # MSSA bacteremia  - 1/2 blood cultures 2/3/2020 positive for MSSA. In setting of confirmed osteomyelitis, pt requires 4 weeks IV cefazolin  - Repeat blood cultures today  - PIC line insertion requires 48 hour negative cultures  - Echo results show no evidence of valvular abnormality concerning for endocarditis       # DM2  # Hyperglycemia  - A1C in 1/2020 was 6.9  - Holding metformin until after surgery this morning  - Currently on Glargine, and sliding scale lispro    # Post procedure constipation prophylaxis  - Senna-docusate PRN    # Hyperlipidemia  - Continue home atorvastatin 40 mg QD

## 2020-02-07 NOTE — ANESTHESIA POSTPROCEDURE EVALUATION
Patient: Rob Webster    Procedure Summary     Date:  02/07/20 Room / Location:  [place] [address] / SURGERY [facility]    Anesthesia Start:  0709 Anesthesia Stop:  0802    Procedures:       INCISION AND DRAINAGE, WOUND, BY ORTHOPEDICS (Right Foot)      CLOSURE, WOUND, ORTHO (Right Foot) Diagnosis:  (Right foot osteomyelitis)    Surgeon:  Juan Randall Responsible Provider:  Arnold Wiley D.O.    Anesthesia Type:  general ASA Status:  3          Final Anesthesia Type: general  Last vitals  BP   Blood Pressure: (!) 162/94    Temp   37.1 °C (98.7 °F)    Pulse   Pulse: 98   Resp   16    SpO2   93 %      Anesthesia Post Evaluation    Patient location during evaluation: PACU  Patient participation: complete - patient participated  Level of consciousness: awake and alert  Pain score: 0    Airway patency: patent  Anesthetic complications: no  Cardiovascular status: hemodynamically stable  Respiratory status: acceptable  Hydration status: euvolemic    PONV: none           Nurse Pain Score: 2 (NPRS)

## 2020-02-08 LAB
BACTERIA BLD CULT: NORMAL
BACTERIA SPEC ANAEROBE CULT: NORMAL
BACTERIA SPEC ANAEROBE CULT: NORMAL
GLUCOSE BLD-MCNC: 202 MG/DL (ref 65–99)
GLUCOSE BLD-MCNC: 255 MG/DL (ref 65–99)
GLUCOSE BLD-MCNC: 259 MG/DL (ref 65–99)
GLUCOSE BLD-MCNC: 266 MG/DL (ref 65–99)
SIGNIFICANT IND 70042: NORMAL
SITE SITE: NORMAL
SOURCE SOURCE: NORMAL

## 2020-02-08 PROCEDURE — 82962 GLUCOSE BLOOD TEST: CPT

## 2020-02-08 PROCEDURE — A9270 NON-COVERED ITEM OR SERVICE: HCPCS | Performed by: STUDENT IN AN ORGANIZED HEALTH CARE EDUCATION/TRAINING PROGRAM

## 2020-02-08 PROCEDURE — 700102 HCHG RX REV CODE 250 W/ 637 OVERRIDE(OP): Performed by: STUDENT IN AN ORGANIZED HEALTH CARE EDUCATION/TRAINING PROGRAM

## 2020-02-08 PROCEDURE — 700102 HCHG RX REV CODE 250 W/ 637 OVERRIDE(OP): Performed by: INTERNAL MEDICINE

## 2020-02-08 PROCEDURE — 700111 HCHG RX REV CODE 636 W/ 250 OVERRIDE (IP): Performed by: STUDENT IN AN ORGANIZED HEALTH CARE EDUCATION/TRAINING PROGRAM

## 2020-02-08 PROCEDURE — 700105 HCHG RX REV CODE 258: Performed by: STUDENT IN AN ORGANIZED HEALTH CARE EDUCATION/TRAINING PROGRAM

## 2020-02-08 PROCEDURE — 770006 HCHG ROOM/CARE - MED/SURG/GYN SEMI*

## 2020-02-08 PROCEDURE — 99233 SBSQ HOSP IP/OBS HIGH 50: CPT | Performed by: INTERNAL MEDICINE

## 2020-02-08 RX ORDER — LISINOPRIL 10 MG/1
10 TABLET ORAL
Status: DISCONTINUED | OUTPATIENT
Start: 2020-02-08 | End: 2020-02-14 | Stop reason: HOSPADM

## 2020-02-08 RX ADMIN — ATORVASTATIN CALCIUM 40 MG: 40 TABLET, FILM COATED ORAL at 04:29

## 2020-02-08 RX ADMIN — CEFAZOLIN SODIUM 2 G: 2 INJECTION, SOLUTION INTRAVENOUS at 04:26

## 2020-02-08 RX ADMIN — MULTIPLE VITAMINS W/ MINERALS TAB 1 TABLET: TAB at 04:30

## 2020-02-08 RX ADMIN — INSULIN HUMAN 3 UNITS: 100 INJECTION, SOLUTION PARENTERAL at 09:48

## 2020-02-08 RX ADMIN — CEFAZOLIN SODIUM 2 G: 2 INJECTION, SOLUTION INTRAVENOUS at 13:48

## 2020-02-08 RX ADMIN — ACETAMINOPHEN 650 MG: 325 TABLET, FILM COATED ORAL at 04:29

## 2020-02-08 RX ADMIN — INSULIN HUMAN 5 UNITS: 100 INJECTION, SOLUTION PARENTERAL at 18:13

## 2020-02-08 RX ADMIN — SENNOSIDES AND DOCUSATE SODIUM 2 TABLET: 8.6; 5 TABLET ORAL at 04:30

## 2020-02-08 RX ADMIN — HYDROCODONE BITARTRATE AND ACETAMINOPHEN 1 TABLET: 10; 325 TABLET ORAL at 13:51

## 2020-02-08 RX ADMIN — LISINOPRIL 10 MG: 10 TABLET ORAL at 09:48

## 2020-02-08 RX ADMIN — INSULIN GLARGINE 10 UNITS: 100 INJECTION, SOLUTION SUBCUTANEOUS at 18:14

## 2020-02-08 RX ADMIN — HYDROCODONE BITARTRATE AND ACETAMINOPHEN 1 TABLET: 10; 325 TABLET ORAL at 21:03

## 2020-02-08 RX ADMIN — INSULIN HUMAN 5 UNITS: 100 INJECTION, SOLUTION PARENTERAL at 12:33

## 2020-02-08 RX ADMIN — METFORMIN HYDROCHLORIDE 500 MG: 500 TABLET ORAL at 18:20

## 2020-02-08 RX ADMIN — INSULIN HUMAN 5 UNITS: 100 INJECTION, SOLUTION PARENTERAL at 20:56

## 2020-02-08 RX ADMIN — SODIUM CHLORIDE: 9 INJECTION, SOLUTION INTRAVENOUS at 04:28

## 2020-02-08 RX ADMIN — CEFAZOLIN SODIUM 2 G: 2 INJECTION, SOLUTION INTRAVENOUS at 21:04

## 2020-02-08 ASSESSMENT — ENCOUNTER SYMPTOMS
ABDOMINAL PAIN: 0
NAUSEA: 0
HEADACHES: 0
MYALGIAS: 1
DIZZINESS: 0
COUGH: 0
SHORTNESS OF BREATH: 0
DIARRHEA: 0
VOMITING: 0
SENSORY CHANGE: 1
CHILLS: 0
FEVER: 0

## 2020-02-08 NOTE — PROGRESS NOTES
[facility] PROGRESS NOTE     Attending: Dr. Sanchez    Senior Resident: Dr. Lewis    Cj Resident: Dr. Clement    PATIENT: Rob Webster; [id number]; 1965 Hospital Day: 6    ID: 54 y.o. male PMH DM II admitted for R great toe swelling, erythema found to have abscess and proximal phalanx and distal 1st metatarsal osteomyelitis. Also with MSSA bacteremia as recently as 02/05 (repeat blood cx 02/07 pending, NGTD)  POD#3 s/p ray amputation  POD#1 s/p debridement and wound closure    SUBJECTIVE: Pt underwent I&D/ washout of wound with closure yesterday morning. Not frankly purulent. Patient tolerated procedure well. Pain well controlled with minimal PRN analgesics (tylenol x2 and norco x2 last 24); no requirement for morphine.     ID with recs for continued cefazolin x 6 weeks, as well as insertion of PICC line once blood cx neg x 72 hrs. 1/2 lood cx 02/05 pos for MSSA. Repeat blood cx 02/07 collected. Not in system; per phone call with micro, they have been received and are currently being incubated and are negative to date.     PT saw patient and he was noted to ambulate with offloading boot and front-wheeled walker. They recommend PT 5x/ week, and discharge to post-acute facility.       OBJECTIVE:     Vitals:    02/07/20 1600 02/07/20 2016 02/08/20 0033 02/08/20 0413   BP: 149/87 135/87 156/85 151/82   Pulse: 97 93 82 81   Resp: 17 18 18 18   Temp: 36.1 °C (97 °F) 37.3 °C (99.1 °F) 36.6 °C (97.8 °F) 36.9 °C (98.4 °F)   TempSrc: Temporal Temporal Temporal Temporal   SpO2: 95% 97% 96% 94%   Weight:    76.1 kg (167 lb 12.3 oz)   Height:           Intake/Output Summary (Last 24 hours) at 2/8/2020 0611  Last data filed at 2/8/2020 0413  Gross per 24 hour   Intake 2105 ml   Output 3445 ml   Net -1340 ml       PE:  General: No acute distress, resting comfortably in bed.  HEENT: Normocephalic, atraumatic. EOMI. MMM  Cardiovascular: RRR with no M/R/G. Loud S2  Respiratory: Symmetrical chest. Clear to  auscultation bilaterally with no wheezes, rales or rhonchi  Abdomen: soft, non-tender, non-distended no masses, +BS   EXT:  Moves all extremities. Clean dressing present on the right foot.   Neuro: non focal, sensation grossly intact    LABS:  No results for input(s): WBC, RBC, HEMOGLOBIN, HEMATOCRIT, MCV, MCH, RDW, PLATELETCT, MPV, NEUTSPOLYS, LYMPHOCYTES, MONOCYTES, EOSINOPHILS, BASOPHILS, RBCMORPHOLO in the last 72 hours.  Recent Labs     02/06/20  0211   SODIUM 132*   POTASSIUM 3.7   CHLORIDE 99   CO2 25   BUN 9   CREATININE 0.72   CALCIUM 8.0*     Estimated GFR/CRCL = Estimated Creatinine Clearance: 109.7 mL/min (by C-G formula based on SCr of 0.72 mg/dL).  Recent Labs     02/06/20  0211  02/07/20  1128 02/07/20  1742 02/07/20  2302   GLUCOSE 187*  --   --   --   --    POCGLUCOSE  --    < > 320* 317* 271*    < > = values in this interval not displayed.                 No results for input(s): INR, APTT, FIBRINOGEN in the last 72 hours.    Invalid input(s): DIMER    MICROBIOLOGY: repeat blood cx 02/05 pos for MSSA; repeat blood cx 02/07 N GTD    IMAGING: none new    MEDS:  Current Facility-Administered Medications   Medication Last Dose   • morphine (pf) 4 MG/ML injection 2 mg     • ceFAZolin in dextrose (ANCEF) IVPB premix 2 g 2 g at 02/08/20 0426   • insulin glargine (LANTUS) injection 10 Units 10 Units at 02/07/20 1746    And   • insulin lispro (HUMALOG) injection 1-6 Units 3 Units at 02/07/20 2311    And   • glucose 4 g chewable tablet 16 g      And   • DEXTROSE 10% BOLUS 250 mL     • senna-docusate (PERICOLACE or SENOKOT S) 8.6-50 MG per tablet 2 Tab 2 Tab at 02/08/20 0430    And   • polyethylene glycol/lytes (MIRALAX) PACKET 1 Packet 1 Packet at 02/04/20 2038    And   • magnesium hydroxide (MILK OF MAGNESIA) suspension 30 mL      And   • bisacodyl (DULCOLAX) suppository 10 mg     • acetaminophen (TYLENOL) tablet 650 mg 650 mg at 02/08/20 0429   • atorvastatin (LIPITOR) tablet 40 mg 40 mg at 02/08/20 0429    • therapeutic multivitamin-minerals (THERAGRAN-M) tablet 1 Tab 1 Tab at 02/08/20 0430   • HYDROcodone/acetaminophen (NORCO)  MG per tablet 1 Tab 1 Tab at 02/07/20 2000       PROBLEM LIST:  No problems updated.    ASSESSMENT/PLAN:   54 y.o. male PMH DM II admitted for R great toe swelling, erythema found to have abscess and proximal phalanx and distal 1st metatarsal osteomyelitis. Also with MSSA bacteremia as recently as 02/05 (repeat blood cx 02/07 pending, NGTD)  POD#3 s/p ray amputation  POD#1 s/p debridement and wound closure        #Osteomyelitis of R great toe and distal 1st metatarsa  #Abscess distal/ medial right foot  #S/p ray amputation 02/05, I&D with wound closure 02/07  #Last tetanus vaccine 12 years prior  2-wk hx edema without known inciting skin break. Seen by ED 2 weeks earlier and x-ray without acute fracture, sent home with walking boot. Upon arrival to ED this visit, presentation consistent with infection. Xray with evidence of osteomyelitis. Afebrile. S/p tetanus vaccine here in hospital. ED with WBC 13.2 and neutrophilia, ESR 93, CRP 24. Subsequent MRI with evidence of osteo (prox phalanx, distal metatarsal). POD #3 s/p ray amputation. ID with recs for IV cefazolin x 6 weeks minimum. PT with recs for d/c to post-acute facility for 5x/ week PT.   - Continue ancef, per ID will need 6 weeks IV abx  - Tylenol, norco, morphine PRN for pain  - Continue to f/u blood cultures/wound cultures  - PT following with recs for 5x/ week PT   - OK to heel weight bear per Ortho  - PICC line to be inserted once neg blood cx x 48 hours       #MSSA Bacteremia  1 of 2 blood culture bottles positive for MSSA, pan sensitive; repeat blood culture 02/05 with prelim growth of gram pos cocci, possibly staph. Note that these cultures were drawn before source control. Repeat blood cx 02/07 NGTD.  ID with recs for ancef x 6 weeks.  Echo 02/06 WNL and no vegetations.   - Continue ancef as above  - F/u blood cx  results       #NIDDM  #HTN  A1c 6.9 in Jan 2020. Metformin 500 BID at home. Currently lantus 10 U qHS, and ISS. Patient diagnosed with DMII in May 2019; states he likely has had it for a long time but did not have a PCP until June 2019. Has never been diagnosed with HTN in the past but BP has been consistently elevated since hospitalization. At first, thought 2/2 pain but now pain very well controlled and BPs still elevated.   - Will follow insulin needs while eating and adjust as needed  - Increase SS  - Likely restart metformin today  - Start lisinopril for HTN and renoprotection; will discharge patient with this medication     #HLD  - Continue home atorvastatin 40 qD     Lines: PIV  IVF: -  Abx: Ancef  GI PPx: multimodal; last BM 02/06  DVT PPx: SCDs  Code: full  Diet: diabetic diet        Dispo: for IV abx, pending PICC line placement once blood cx neg x 72 hours, and eventual d/c to post-acute setting for continued PT     Michelle Clement MD, PGY1

## 2020-02-08 NOTE — CARE PLAN
Problem: Communication  Goal: The ability to communicate needs accurately and effectively will improve  Outcome: PROGRESSING AS EXPECTED  Note:   A/Ox4. Able to make needs known. Arabic is primary language. There does not appear to be any language barrier regarding english usage.      Problem: Safety  Goal: Will remain free from injury  Outcome: PROGRESSING AS EXPECTED  Note:   Bed in lowest position. Call light within reach. No impulsive behavior observed. Non skid socks on.      Problem: Infection  Goal: Will remain free from infection  Outcome: PROGRESSING AS EXPECTED  Note:   R foot dressing CDI. Last WBC on 2/05. Afebrile. Slightly hypertensive. Lisinopril started.      Problem: Venous Thromboembolism (VTW)/Deep Vein Thrombosis (DVT) Prevention:  Goal: Patient will participate in Venous Thrombosis (VTE)/Deep Vein Thrombosis (DVT)Prevention Measures  Outcome: PROGRESSING AS EXPECTED  Note:   SCD's on.      Problem: Bowel/Gastric:  Goal: Normal bowel function is maintained or improved  Outcome: PROGRESSING AS EXPECTED  Note:   States Bm made yesterday. Denies constipation or bloating.      Problem: Pain Management  Goal: Pain level will decrease to patient's comfort goal  Outcome: PROGRESSING AS EXPECTED  Note:   Norco provided states adequate relief.      Problem: Mobility  Goal: Risk for activity intolerance will decrease  Outcome: PROGRESSING AS EXPECTED  Note:   Up with x1 person assist with FWW. Heel weight baring to R foot with offloading shoe.      Problem: Acute Care of the Diabetic Patient  Goal: Optimal Outcome for the Diabetic Patient  Outcome: PROGRESSING AS EXPECTED  Note:   AC/HS checks. FSBS >200. Sliding scale adjusted.

## 2020-02-08 NOTE — PROGRESS NOTES
Infectious Disease Progress Note    Author: Anitra Conner M.D. Date & Time of service: 2020  9:47 AM    Chief Complaint:  Follow-up for MSSA sepsis/right foot osteomyelitis    Interval History:   afebrile, no CBC.  Patient resting comfortably with no new complaints.  Family at bedside.  Plan of care discussed in detail with patient at bedside.  He states he will be transferred to a nursing skilled facility at discharge.    Labs Reviewed and Medications Reviewed.    Review of Systems:  Review of Systems   Constitutional: Negative for chills and fever.   Respiratory: Negative for cough and shortness of breath.    Gastrointestinal: Negative for abdominal pain, diarrhea, nausea and vomiting.   Musculoskeletal: Positive for myalgias.   Neurological: Positive for sensory change. Negative for dizziness and headaches.   All other systems reviewed and are negative.      Hemodynamics:  Temp (24hrs), Av.3 °C (97.4 °F), Min:35.8 °C (96.5 °F), Max:37.3 °C (99.1 °F)  Temperature: 36.7 °C (98 °F)  Pulse  Av.2  Min: 81  Max: 107   Blood Pressure: 150/91       Physical Exam:  Physical Exam  Constitutional:       Appearance: Normal appearance.   HENT:      Mouth/Throat:      Mouth: Mucous membranes are moist.      Pharynx: Oropharynx is clear. No oropharyngeal exudate.   Eyes:      Extraocular Movements: Extraocular movements intact.      Conjunctiva/sclera: Conjunctivae normal.      Pupils: Pupils are equal, round, and reactive to light.   Neck:      Musculoskeletal: Normal range of motion and neck supple.   Cardiovascular:      Rate and Rhythm: Normal rate and regular rhythm.      Pulses: Normal pulses.      Heart sounds: Normal heart sounds.   Pulmonary:      Effort: Pulmonary effort is normal.      Breath sounds: Normal breath sounds.   Abdominal:      Palpations: Abdomen is soft.      Tenderness: There is no tenderness.   Musculoskeletal:      Comments: Right foot and surgical dressing   Skin:     General:  Skin is warm and dry.   Neurological:      General: No focal deficit present.      Mental Status: He is alert and oriented to person, place, and time.      Cranial Nerves: No cranial nerve deficit.   Psychiatric:         Mood and Affect: Mood normal.         Behavior: Behavior normal.      Comments: Pleasant         Meds:    Current Facility-Administered Medications:   •  insulin regular **AND** Accu-Chek ACHS **AND** NOTIFY MD and PharmD **AND** glucose **AND** dextrose 10% bolus  •  lisinopril  •  metFORMIN  •  morphine injection  •  ceFAZolin  •  insulin glargine **AND** [DISCONTINUED] insulin lispro **AND** Accu-Chek Q6 if NPO **AND** NOTIFY MD and PharmD **AND** [DISCONTINUED] glucose **AND** [DISCONTINUED] dextrose 10% bolus  •  senna-docusate **AND** polyethylene glycol/lytes **AND** magnesium hydroxide **AND** bisacodyl  •  acetaminophen  •  atorvastatin  •  therapeutic multivitamin-minerals  •  HYDROcodone/acetaminophen    Labs:  No results for input(s): WBC, RBC, HEMOGLOBIN, HEMATOCRIT, MCV, MCH, RDW, PLATELETCT, MPV, NEUTSPOLYS, LYMPHOCYTES, MONOCYTES, EOSINOPHILS, BASOPHILS, RBCMORPHOLO in the last 72 hours.  Recent Labs     02/06/20  0211   SODIUM 132*   POTASSIUM 3.7   CHLORIDE 99   CO2 25   GLUCOSE 187*   BUN 9     Recent Labs     02/06/20  0211   CREATININE 0.72       Imaging:  Dx-foot-complete 3+ Right    Result Date: 2/3/2020  2/3/2020 6:28 PM HISTORY/REASON FOR EXAM:  Pain/Deformity Following Trauma Redness, swelling and pain TECHNIQUE/EXAM DESCRIPTION AND NUMBER OF VIEWS: 3 views of the RIGHT foot. COMPARISON:  1/20/2020 FINDINGS: There is no fracture or dislocation. The visualized osseous structures are in anatomic alignment. There are mild degenerative changes of the great toe MTP joint. There is some soft tissue swelling of the great toe and in the dorsum of the foot. There is suggestion of small marginal erosion medial proximal phalangeal base of the great toe, not well seen on prior study.  Cannot exclude osteomyelitis or septic arthritis. MRI without and with contrast is recommended for further evaluation. Achilles and plantar calcaneal spurs. Extensive atherosclerosis.     Mildly increased soft tissue swelling. Periarticular erosion most conspicuous about the great toe proximal phalanx which could represent osteomyelitis end your septic arthritis. MRI without and with contrast is recommended for further evaluation.    Dx-foot-complete 3+ Right    Result Date: 1/20/2020 1/20/2020 5:58 AM HISTORY/REASON FOR EXAM:  Pain/Deformity Following Trauma Great toe injury, pain TECHNIQUE/EXAM DESCRIPTION AND NUMBER OF VIEWS: 3 views of the RIGHT foot. COMPARISON:  None. FINDINGS: There is no fracture or dislocation. Small calcific density adjacent to the distal phalangeal neck of the great toe measuring 3 mm of uncertain etiology or significance. The visualized osseous structures are in anatomic alignment. Minimal degenerative changes great toe MTP joint within the interphalangeal joints. Plantar and Achilles calcaneal spurs. Bone mineralization is age-appropriate.. Prominent atherosclerotic arterial calcifications. Soft tissue swelling dorsum of the foot.     Soft tissue swelling without acute osseous abnormality. If pain persists, repeat radiographs in 7-10 days is recommended.    Mr-foot-with & W/o Right    Result Date: 2/4/2020 2/4/2020 1:46 PM HISTORY/REASON FOR EXAM:  Osteomyelitis suspected, diabetic. Great toe swelling and pain for 2 weeks. TECHNIQUE/EXAM DESCRIPTION: MRI of the RIGHT foot with and without contrast. Using a Sensopia 1.5 Rosa MRI scanner, T1 axial, sagittal, and coronal, fast spin-echo T2 fat-suppressed axial and coronal, fast inversion recovery sagittal, and T1 fat suppressed axial and sagittal post intravenous contrast images were obtained. A total of 15 mL ProHance given. COMPARISON:  Radiographs February 3. FINDINGS: There is considerable first ray abnormality. There is fatty  marrow replacement with increased T2, decreased T1 signal throughout the first metatarsal and proximal phalanx. The abnormal signal extends all the way to the first metatarsal base. There is no abnormal first TMT effusion. There is patchy bony enhancement in the distal first metatarsal neck, head and just anterior to the metatarsal base. There are some areas of absent enhancement at the plantar first metatarsal base as well as in the lateral metatarsal head which could indicate necrosis. There is edema and enhancement tracking along the metatarsal shaft but this has a rind of absent enhancement measuring up to 7 mm and this is compatible with necrotic tissue favored over abscess because this does not have as high a T2 signal as would be expected for abscess. Both are possible. There is absent enhancement which wraps around the lateral margin of the mid metatarsal shaft into the dorsal forefoot soft tissues anteriorly where there is a dorsal proximal phalanx level area of skin ulceration  and absent enhancement. This may be a draining sinus There is mild edema and enhancement centered at the medial intercuneiform articulation and this is most likely degenerative The remainder of the bony structures are normal. There is no subluxation or bony fragmentation. There is diffuse forefoot and midfoot soft tissue edema especially along the dorsum subcutaneous fat. There is skin thickening and enhancement anteromedially in the forefoot. There is some intrinsic musculature enhancement centered at the first but also involving the second and third rays No abnormal joint effusion is seen No abnormal flexor or extensor tendon signal is noted     First metatarsal and proximal phalanx osteomyelitis with possible necrosis. First metatarsal-phalangeal effusion from septic arthropathy is not seen, indicating the joint fluid is likely draining through the dorsal soft tissues Dorsal lateral great toe skin ulceration with probable  draining sinus and underlying necrotic tissue interposed between the first and second metatarsal shafts, wrapping under the plantar aspect of the proximal first metatarsal shaft Medial forefoot cellulitis, myositis Mild midfoot osteoarthritis    Ec-echocardiogram Complete W/o Cont    Result Date: 2020  Transthoracic Echo Report Echocardiography Laboratory CONCLUSIONS No prior study is available for comparison. Normal left ventricular systolic function. No evidence of valvular abnormality based on Doppler evaluation. NABILA URIAS Exam Date:         2020                    11:16 Exam Location:     Inpatient Priority:          Routine Ordering Physician:        YOVANY GILLIAM Referring Physician:       [id number] Baldwin Sonographer:               Tico Smith RDCS Age:    54     Gender:    M MRN:    [id number] :    1965 BSA:    1.83   Ht (in):    63     Wt (lb):    176 Exam Type:     Complete Indications:     Acute/Subacute Bacterial Endocarditis ICD Codes:       421 CPT Codes:       27714 BP:   161    /   87     HR:   100 Technical Quality:       Fair MEASUREMENTS  (Male / Female) Normal Values 2D ECHO LV Diastolic Diameter PLAX        4.5 cm                4.2 - 5.9 / 3.9 - 5.3 cm LV Systolic Diameter PLAX         2.7 cm                2.1 - 4.0 cm IVS Diastolic Thickness           0.94 cm               LVPW Diastolic Thickness          1 cm                  RV Diameter 4C                    2.7 cm                2.5 - 2.9 cm LVOT Diameter                     2.1 cm                RA Diameter                       3.9 cm                Estimated LV Ejection Fraction    65 %                  LV Ejection Fraction MOD BP       62.5 %                >= 55  % LV Ejection Fraction MOD 4C       62.4 %                LV Ejection Fraction MOD 2C       60.9 %                LA Volume Index                    28.4 cm3/m2           16 - 28 cm3/m2 IVC Diameter                      1.7 cm                DOPPLER AV Peak Velocity                  1.3 m/s               AV Peak Gradient                  6.4 mmHg              AV Mean Gradient                  3.7 mmHg              LVOT Peak Velocity                0.97 m/s              AV Area Cont Eq vti               2.8 cm2               MV Velocity Time Integral         19.6 cm               Mitral E Point Velocity           0.86 m/s              Mitral E to A Ratio               0.95                  MV Pressure Half Time             55.2 ms               MV Area PHT                       4 cm2                 MV Deceleration Time              190 ms                PV Peak Velocity                  1.3 m/s               PV Peak Gradient                  6.8 mmHg              RVOT Peak Velocity                0.67 m/s              * Indicates values subject to auto-interpretation LV EF:  65    % FINDINGS Left Ventricle Normal left ventricular chamber size. Normal left ventricular wall thickness. Normal left ventricular systolic function. Left ventricular ejection fraction is visually estimated to be 65%. Normal regional wall motion. Normal diastolic function. Right Ventricle Normal right ventricular size and systolic function. Right Atrium Normal right atrial size. Normal inferior vena cava size and inspiratory collapse. Left Atrium Normal left atrial size. Left atrial volume index is 28 mL/sq m. Mitral Valve Structurally normal mitral valve. No mitral stenosis. Trace mitral regurgitation. Aortic Valve Structurally normal aortic valve. No aortic stenosis. No aortic insufficiency. Tricuspid Valve Structurally normal tricuspid valve. No tricuspid stenosis. Trace tricuspid regurgitation. Unable to estimate pulmonary artery pressure due to an inadequate tricuspid regurgitant jet. Pulmonic Valve Structurally normal pulmonic valve. No pulmonic stenosis. No pulmonic  insufficiency. Pericardium No pericardial effusion seen. Aorta Normal aortic root for body surface area. Ascending aorta diameter is 3.2 cm. Rivera AGUIRRE To (Electronically Signed) Final Date:     2020                 12:09    Us-renal Artery Duplex Comp    Result Date: 2020  Renal Artery Duplex  Ultrasound Report  Vascular Laboratory  CONCLUSIONS  No evidence of renal artery stenosis.  NABILA URIAS  Exam Date:     2020 07:11  Room #:     Inpatient  Priority:     Routine  Ht (in):             Wt (lb):  Ordering Physician:        MARIETTA ESTEBAN  Referring Physician:       [id number] Stephens  Sonographer:               Sonia Morris RVT  Study Type:                Complete Bilateral  Technical Quality:         Adequate  Age:    54    Gender:     M  MRN:    [id number]  :    1965      BSA:  Indications:     Diabetes  CPT Codes:       41378  ICD Codes:       250  History:         Pain in abdomen. History of diabetes with right great toe                   infection. No prior duplex.  Limitations:  Right           /  Brachial Blood Pressure (mmHg)  Left            /                     PSV (cm/sec)           Artery                  Diameter (cm)                         197.00             Celiac                         101.00             SMA                         82.00              Aorta - Proximal           2.40                         155.00             Aorta - Mid                2.60                         86.00              Aorta - Distal             1.70            Right Kidney                                                Left Kidney  Renal/Aortic Ratio    PSV (cm/sec)                                PSV (cm/sec)    Renal/Aortic Ratio       0.65              66.00       Proximal Renal Artery               51.00           0.50       0.51              52.00       Mid Renal Artery                    35.00           0.35       1.15              116.00      Distal Renal Artery          "        62.00           0.61  Resistive Index        55.00       Renal Artery At Hilum               52.00          Resistive Index  At Hilum                                                                              At Hilum        0.68             45.00       Medullary Artery                    46.00                0.65                         29.00       Cortical Artery                     29.00                         11.70       Kidney Length (cm)                  11.00                         2.70        Cortical Thickness (cm)             2.60                         6.04        Kidney Diameter (cm)                6.20           Normal                    Appearance                          Normal           Normal                    Perfusion Scan                      Normal  FINDINGS  Widening of the abdominal aorta to a maximum diameter of 2.6 cm.  Velocities and waveforms are normal through the bilateral renal arteries.  Waveforms of the bilateral renal veins are normal.  Resistive indices are normal at the bilateral renal zhou.  Bilateral kidney size, perfusion and tissue densities appear normal.  Renal aortic ratios are within normal limits: Right: 0.6 to 1.4, Left: 0.4 to   0.75.  Isidro Rodriguez MD  (Electronically Signed)  Final Date:      04 February 2020 14:39      Micro:  Results     Procedure Component Value Units Date/Time    Blood Culture [[id number]] Collected:  02/07/20 1206    Order Status:  Completed Specimen:  Blood from Peripheral Updated:  02/08/20 0701     Significant Indicator NEG     Source BLD     Site PERIPHERAL     Culture Result No Growth  Note: Blood cultures are incubated for 5 days and  are monitored continuously.Positive blood cultures  are called to the RN and reported as soon as  they are identified.      Narrative:       Per Hospital Policy: Only change Specimen Src: to \"Line\" if  specified by physician order.  Left Forearm/Arm    Blood Culture [[id number]] Collected:  " "02/07/20 1206    Order Status:  Completed Specimen:  Blood from Peripheral Updated:  02/08/20 0701     Significant Indicator NEG     Source BLD     Site PERIPHERAL     Culture Result No Growth  Note: Blood cultures are incubated for 5 days and  are monitored continuously.Positive blood cultures  are called to the RN and reported as soon as  they are identified.      Narrative:       Per Hospital Policy: Only change Specimen Src: to \"Line\" if  specified by physician order.  Left AC    BLOOD CULTURE [[id number]]  (Abnormal) Collected:  02/05/20 1655    Order Status:  Completed Specimen:  Blood from Peripheral Updated:  02/07/20 1036     Significant Indicator POS     Source BLD     Site PERIPHERAL     Culture Result Growth detected by Bactec instrument. 02/06/2020  18:16      Staphylococcus aureus  See previous culture for sensitivity report.      Narrative:       CALL  Mccormick  131 tel. [phone],  CALLED  131 tel. [phone] 02/06/2020, 18:20, RB PERF. RESULTS CALLED  TO:[id number]  Per Hospital Policy: Only change Specimen Src: to \"Line\" if  specified by physician order.  Left Forearm/Arm    Anaerobic Culture [[id number]] Collected:  02/05/20 1618    Order Status:  Completed Specimen:  Wound Updated:  02/07/20 1005     Significant Indicator NEG     Source WND     Site Right Bone Toe Biopsy     Culture Result Culture in progress.    CULTURE WOUND W/ GRAM STAIN [[id number]]  (Abnormal) Collected:  02/05/20 1618    Order Status:  Completed Specimen:  Wound Updated:  02/07/20 1005     Significant Indicator POS     Source WND     Site Right Bone Toe Biopsy     Culture Result -     Gram Stain Result Rare WBCs.  Few Gram positive cocci.       Culture Result Staphylococcus aureus  Heavy growth  See previous culture for sensitivity report.      CULTURE WOUND W/ GRAM STAIN [[id number]]  (Abnormal)  (Susceptibility) Collected:  02/05/20 1555    Order Status:  Completed Specimen:  Wound Updated:  02/07/20 1004     Significant Indicator " "POS     Source WND     Site Right Great Toe Deep     Culture Result -     Gram Stain Result Moderate WBCs.  Few Gram positive cocci.       Culture Result Staphylococcus aureus  Heavy growth      Narrative:       Surgery - swabs received    Susceptibility     Staphylococcus aureus (1)     Antibiotic Interpretation Microscan Method Status    Azithromycin Sensitive <=2 mcg/mL BELEN Final    Clindamycin Sensitive <=0.5 mcg/mL BELEN Final    Cefazolin Sensitive <=8 mcg/mL BELEN Final    Ceftaroline Sensitive <=0.5 mcg/mL BELEN Final    Daptomycin Sensitive <=1 mcg/mL BELEN Final    Ampicillin/sulbactam Sensitive <=8/4 mcg/mL BELEN Final    Erythromycin Sensitive <=0.25 mcg/mL BELEN Final    Vancomycin Sensitive 1 mcg/mL BELEN Final    Oxacillin Sensitive <=0.25 mcg/mL BELEN Final    Pip/Tazobactam Sensitive <=4 mcg/mL BELEN Final    Trimeth/Sulfa Sensitive <=0.5/9.5 mcg/mL BELEN Final    Tetracycline Sensitive <=4 mcg/mL BELEN Final                   Anaerobic Culture [[id number]] Collected:  02/05/20 1555    Order Status:  Completed Specimen:  Wound Updated:  02/07/20 1004     Significant Indicator NEG     Source WND     Site Right Great Toe Deep     Culture Result Culture in progress.    Narrative:       Surgery - swabs received    BLOOD CULTURE x2 [[id number]]  (Abnormal)  (Susceptibility) Collected:  02/03/20 1632    Order Status:  Completed Specimen:  Blood from Peripheral Updated:  02/06/20 0835     Significant Indicator POS     Source BLD     Site PERIPHERAL     Culture Result Growth detected by Bactec instrument. 02/04/2020  19:13  Staphylococcus aureus (methicillin sensitive)  detected by PCR.        Staphylococcus aureus    Narrative:       CALL  Mccormick  131 tel. [phone],  CALLED  131 tel. [phone] 02/04/2020, 19:16, RB PERF. RESULTS CALLED TO: RN  [phone]  Per Hospital Policy: Only change Specimen Src: to \"Line\" if  specified by physician order.  Right Hand    Susceptibility     Staphylococcus aureus (1)     Antibiotic Interpretation " "Microscan Method Status    Azithromycin Sensitive <=2 mcg/mL BELEN Final    Clindamycin Sensitive <=0.5 mcg/mL BELEN Final    Cefazolin Sensitive <=8 mcg/mL BELEN Final    Ceftaroline Sensitive <=0.5 mcg/mL BELEN Final    Daptomycin Sensitive <=1 mcg/mL BELEN Final    Ampicillin/sulbactam Sensitive <=8/4 mcg/mL BELEN Final    Erythromycin Sensitive <=0.25 mcg/mL BELEN Final    Vancomycin Sensitive 1 mcg/mL BELEN Final    Oxacillin Sensitive <=0.25 mcg/mL BELEN Final    Pip/Tazobactam Sensitive <=4 mcg/mL BELEN Final    Trimeth/Sulfa Sensitive <=0.5/9.5 mcg/mL BELEN Final    Tetracycline Sensitive <=4 mcg/mL BELEN Final                   BLOOD CULTURE [[id number]] Collected:  02/05/20 1655    Order Status:  Completed Specimen:  Blood from Peripheral Updated:  02/06/20 0712     Significant Indicator NEG     Source BLD     Site PERIPHERAL     Culture Result No Growth  Note: Blood cultures are incubated for 5 days and  are monitored continuously.Positive blood cultures  are called to the RN and reported as soon as  they are identified.      Narrative:       Per Hospital Policy: Only change Specimen Src: to \"Line\" if  specified by physician order.  Left Hand    GRAM STAIN [[id number]] Collected:  02/05/20 1555    Order Status:  Completed Specimen:  Wound Updated:  02/05/20 2041     Significant Indicator .     Source WND     Site Right Great Toe Deep     Gram Stain Result Moderate WBCs.  Few Gram positive cocci.      Narrative:       Surgery - swabs received    GRAM STAIN [[id number]] Collected:  02/05/20 1618    Order Status:  Completed Specimen:  Wound Updated:  02/05/20 2040     Significant Indicator .     Source WND     Site Right Bone Toe Biopsy     Gram Stain Result Rare WBCs.  Few Gram positive cocci.      BLOOD CULTURE x2 [[id number]] Collected:  02/03/20 1632    Order Status:  Completed Specimen:  Blood from Peripheral Updated:  02/04/20 0637     Significant Indicator NEG     Source BLD     Site PERIPHERAL     Culture Result No " "Growth  Note: Blood cultures are incubated for 5 days and  are monitored continuously.Positive blood cultures  are called to the RN and reported as soon as  they are identified.      Narrative:       Per Hospital Policy: Only change Specimen Src: to \"Line\" if  specified by physician order.  Right AC    CULTURE WOUND W/ GRAM STAIN [[id number]]     Order Status:  No result Specimen:  Wound from Toe           Assessment:  Active Hospital Problems    Diagnosis   • Cellulitis of toe, right [L03.031]   • Osteomyelitis of great toe of right foot ([facility]) [M86.9]   • DM (diabetes mellitus) ([facility]) [E11.9]   • Hyperlipidemia [E78.5]       Pertinent diagnoses:  Right great toe osteomyelitis  MSSA bacteremia, source foot  Type 2 diabetes mellitus, HgA1c 6.9  Leukocytosis     Plan:  - Continue IV cefazolin 2 g every 8 hours  - Blood cultures on 2/3,  2/5 - MSSA  -Repeat blood cultures on 2/7 - NGTD  - TTE - negative  -Pathology +OM  -Status post I&D down to bone, right great toe amputation with wound VAC placement on 2/5 with Dr. Brower.  Gross purulence noted per the operative note  - Status post repeat I&D with wound closure on 2/7 by Dr. Randall  -Anticipate a 6 weeks of IV antibiotics for osteomyelitis from date of 1st negative BCx  --PICC line once repeat Bcx negative for at least 72 hours.  -Blood sugar control to control infection and promote wound healing.    Disposition: TBD    I have performed a physical exam and reviewed and updated ROS and plan today 2/8/2020.  In review of yesterday's note 2/7/2020, there are no changes except as documented above.      Discussed with internal medicine.  "

## 2020-02-08 NOTE — CARE PLAN
Problem: Communication  Goal: The ability to communicate needs accurately and effectively will improve  Outcome: PROGRESSING AS EXPECTED   Pt calls staff appropriately, Pt is able to communicate all needs.    Problem: Pain Management  Goal: Pain level will decrease to patient's comfort goal  Outcome: PROGRESSING AS EXPECTED  Pt pain is managed with PRN pain medication.

## 2020-02-08 NOTE — PROGRESS NOTES
LIMB PRESERVATION SERVICE   POST SURGICAL PROGRESS NOTE      HPI:  Rob Webster is a 54 y.o.  with a past medical history that includes type 2 diabetes, admitted 2/3/2020 for DM foot (HCC)  DM foot (HCC).   LPS has been consulted for R great toe abscess.       Patient reports on 1/17/2020 he [place] in the park and his right great toe began to swell.  He took some Tylenol with no relief.  He tried Arnica gel but still no relief.  The toe became increasingly painful and swollen.  He went to the ER on 1/20/2020.  Was given some pain medicine and discharged home.  He returned to the ED on 2/3/2020 with increased erythema, edema, pain to his foot.  Patient also recalls around [place] time 2019.  Patient found a piece of metal that went through the top part of his shoe over his great toe.  He removed the metal, did not notice any bleeding or wound.      SURGERY DATE: 02/05/2020  PROCEDURE:   1.  Right foot irrigation and debridement to the level of skin, subcutaneous   tissue, tendon, bone.  2.  Right foot first ray amputation to the metatarsal shaft.  3.  Right first metatarsal bone biopsy.  4.  Right foot wound vacuum application of approximately 3x12 cm.    SURGERY DATE: 02/07/2020  PROCEDURE:   1.  Right foot irrigation and debridement.  2.  Right foot wound closure.    2/8/2020: Patient denies fevers, chills, nausea, vomiting.  Pain well controlled.       PERTINENT LPS RESULTS:   OR pathology   FINAL DIAGNOSIS:    A. Right great toe:         Section specimen demonstrates benign skin and subcutaneous          tissue with area of ulceration, associated marked acute          inflammation, necrosis and fibrinopurulent debris.         Underlying bone demonstrates areas of acute inflammation          consistent with osteomyelitis.         Skin/soft tissue margin demonstrates areas of acute inflammation          and fibropurulent debris.         Bone resection margin demonstrates area of necrotic debris and          " few scattered neutrophils focally extending to the inked          resection margin.  B. Right bone toe biopsy:         Small fragments of benign bone with intervening fibrofatty          marrow spaces demonstrating necrotic debris, few small          aggregates of neutrophils and reactive changes, overall          findings suspicious for osteomyelitis.      OR microbiology  Significant Indicator POSPositive (POS)    Source WND    Site Right Bone Toe Biopsy    Culture Result -Abnormal     Gram Stain Result Rare WBCs.   Few Gram positive cocci.    Culture Result Abnormal    Staphylococcus aureus   Heavy growth   See previous culture for sensitivity report.        SURGICAL SITE EXAM:      /91   Pulse 86   Temp 36.7 °C (98 °F) (Temporal)   Resp 18   Ht 1.6 m (5' 2.99\")   Wt 76.1 kg (167 lb 12.3 oz)   SpO2 94%   BMI 29.73 kg/m²     Pedal Pulses 2+ DP/PT  Foot warm to touch    Incision well approximated, sutures intact.   no Erythema  2+ pitting Edema  no Drainage       Incision 02/05/20 Foot (Active)   Site Assessment JOSHUA 2/8/2020  7:45 AM   Tonya-wound Assessment Clean;Dry;Intact 2/8/2020  9:00 AM   Wound Length (cm) 10.5 cm 2/8/2020  9:00 AM   Wound Width (cm) 0 cm 2/8/2020  9:00 AM   Wound Depth (cm) 0 cm 2/8/2020  9:00 AM   Wound Surface Area (cm^2) 0 cm^2 2/8/2020  9:00 AM   Tunneling 0 cm 2/8/2020  9:00 AM   Undermining 0 cm 2/8/2020  9:00 AM   Closure Approximated;Sutures 2/8/2020  9:00 AM   Drainage Amount None 2/8/2020  9:00 AM   Drainage Description Serosanguineous 2/6/2020  7:50 PM   Treatments Cleansed 2/8/2020  9:00 AM   Periwound Protectant Not Applicable 2/8/2020  9:00 AM   Dressing Options Nonadherent Contact Layer;Dry Gauze;Roll Gauze 2/8/2020  9:00 AM   Dressing Changed Changed 2/8/2020  9:00 AM   Dressing Status Clean;Dry;Intact 2/8/2020  9:00 AM   Dressing Change Frequency Daily 2/8/2020  9:00 AM   NEXT Dressing Change  02/09/20 2/8/2020  9:00 AM   NEXT Weekly Photo (Inpatient Only) " "02/15/20 2/8/2020  9:00 AM   WOUND NURSE ONLY - Odor None 2/8/2020  9:00 AM   WOUND NURSE ONLY - Pulses Right;2+;Bounding;DP;PT 2/8/2020  9:00 AM   WOUND NURSE ONLY - Exposed Structures None 2/8/2020  9:00 AM   WOUND NURSE ONLY - Tissue Type and Percentage 100% well approximated sutures 2/8/2020  9:00 AM   WOUND NURSE ONLY - Time Spent with Patient (mins) 30 2/8/2020  9:00 AM             DIABETES MANAGEMENT:    Blood glucose:   Results from last 7 days   Lab Units 02/08/20  0833 02/07/20  2302 02/07/20  1742 02/07/20  1128 02/07/20  0555 02/06/20  2300 02/06/20  1751 02/06/20  1153   ACCU CHECK GLUCOSE 788 mg/dL 202* 271* 317* 320* 188* 256* 226* 241*     A1c:   Lab Results   Component Value Date/Time    HBA1C 6.9 (H) 01/21/2020 09:37 AM        INFECTION MANAGEMENT:    Results from last 7 days   Lab Units 02/05/20  0042 02/04/20  0539 02/03/20  1833   WBC 1501 K/uL 12.5* 12.8* 13.2*   PLATELET COUNT 1518 K/uL 348 338 361     Wound culture results:   Results     Procedure Component Value Units Date/Time    Blood Culture [[id number]] Collected:  02/07/20 1206    Order Status:  Completed Specimen:  Blood from Peripheral Updated:  02/08/20 0701     Significant Indicator NEG     Source BLD     Site PERIPHERAL     Culture Result No Growth  Note: Blood cultures are incubated for 5 days and  are monitored continuously.Positive blood cultures  are called to the RN and reported as soon as  they are identified.      Narrative:       Per Hospital Policy: Only change Specimen Src: to \"Line\" if  specified by physician order.  Left Forearm/Arm    Blood Culture [[id number]] Collected:  02/07/20 1206    Order Status:  Completed Specimen:  Blood from Peripheral Updated:  02/08/20 0701     Significant Indicator NEG     Source BLD     Site PERIPHERAL     Culture Result No Growth  Note: Blood cultures are incubated for 5 days and  are monitored continuously.Positive blood cultures  are called to the RN and reported as soon as  they are " "identified.      Narrative:       Per Hospital Policy: Only change Specimen Src: to \"Line\" if  specified by physician order.  Left AC    BLOOD CULTURE [[id number]]  (Abnormal) Collected:  02/05/20 1655    Order Status:  Completed Specimen:  Blood from Peripheral Updated:  02/07/20 1036     Significant Indicator POS     Source BLD     Site PERIPHERAL     Culture Result Growth detected by Bactec instrument. 02/06/2020  18:16      Staphylococcus aureus  See previous culture for sensitivity report.      Narrative:       CALL  Mccormick  131 tel. [phone],  CALLED  131 tel. [phone] 02/06/2020, 18:20, RB PERF. RESULTS CALLED  TO:[phone]  Per Hospital Policy: Only change Specimen Src: to \"Line\" if  specified by physician order.  Left Forearm/Arm    Anaerobic Culture [[id number]] Collected:  02/05/20 1618    Order Status:  Completed Specimen:  Wound Updated:  02/07/20 1005     Significant Indicator NEG     Source WND     Site Right Bone Toe Biopsy     Culture Result Culture in progress.    CULTURE WOUND W/ GRAM STAIN [[id number]]  (Abnormal) Collected:  02/05/20 1618    Order Status:  Completed Specimen:  Wound Updated:  02/07/20 1005     Significant Indicator POS     Source WND     Site Right Bone Toe Biopsy     Culture Result -     Gram Stain Result Rare WBCs.  Few Gram positive cocci.       Culture Result Staphylococcus aureus  Heavy growth  See previous culture for sensitivity report.      CULTURE WOUND W/ GRAM STAIN [[id number]]  (Abnormal)  (Susceptibility) Collected:  02/05/20 1555    Order Status:  Completed Specimen:  Wound Updated:  02/07/20 1004     Significant Indicator POS     Source WND     Site Right Great Toe Deep     Culture Result -     Gram Stain Result Moderate WBCs.  Few Gram positive cocci.       Culture Result Staphylococcus aureus  Heavy growth      Narrative:       Surgery - swabs received    Susceptibility     Staphylococcus aureus (1)     Antibiotic Interpretation Microscan Method Status    " "Azithromycin Sensitive <=2 mcg/mL BELEN Final    Clindamycin Sensitive <=0.5 mcg/mL BELEN Final    Cefazolin Sensitive <=8 mcg/mL BELEN Final    Ceftaroline Sensitive <=0.5 mcg/mL BELEN Final    Daptomycin Sensitive <=1 mcg/mL BELEN Final    Ampicillin/sulbactam Sensitive <=8/4 mcg/mL BELEN Final    Erythromycin Sensitive <=0.25 mcg/mL BELEN Final    Vancomycin Sensitive 1 mcg/mL BELEN Final    Oxacillin Sensitive <=0.25 mcg/mL BELEN Final    Pip/Tazobactam Sensitive <=4 mcg/mL BELEN Final    Trimeth/Sulfa Sensitive <=0.5/9.5 mcg/mL BELEN Final    Tetracycline Sensitive <=4 mcg/mL BELEN Final                   Anaerobic Culture [[id number]] Collected:  02/05/20 1555    Order Status:  Completed Specimen:  Wound Updated:  02/07/20 1004     Significant Indicator NEG     Source WND     Site Right Great Toe Deep     Culture Result Culture in progress.    Narrative:       Surgery - swabs received    BLOOD CULTURE x2 [[id number]]  (Abnormal)  (Susceptibility) Collected:  02/03/20 1632    Order Status:  Completed Specimen:  Blood from Peripheral Updated:  02/06/20 0835     Significant Indicator POS     Source BLD     Site PERIPHERAL     Culture Result Growth detected by Bactec instrument. 02/04/2020  19:13  Staphylococcus aureus (methicillin sensitive)  detected by PCR.        Staphylococcus aureus    Narrative:       CALL  Mccormick  131 tel. [phone],  CALLED  131 tel. [phone] 02/04/2020, 19:16, RB PERF. RESULTS CALLED TO: RN  [phone]  Per Hospital Policy: Only change Specimen Src: to \"Line\" if  specified by physician order.  Right Hand    Susceptibility     Staphylococcus aureus (1)     Antibiotic Interpretation Microscan Method Status    Azithromycin Sensitive <=2 mcg/mL BELEN Final    Clindamycin Sensitive <=0.5 mcg/mL BELEN Final    Cefazolin Sensitive <=8 mcg/mL BELEN Final    Ceftaroline Sensitive <=0.5 mcg/mL BELEN Final    Daptomycin Sensitive <=1 mcg/mL BELEN Final    Ampicillin/sulbactam Sensitive <=8/4 mcg/mL BELEN Final    Erythromycin Sensitive " "<=0.25 mcg/mL BELEN Final    Vancomycin Sensitive 1 mcg/mL BELEN Final    Oxacillin Sensitive <=0.25 mcg/mL BELEN Final    Pip/Tazobactam Sensitive <=4 mcg/mL BELEN Final    Trimeth/Sulfa Sensitive <=0.5/9.5 mcg/mL BELEN Final    Tetracycline Sensitive <=4 mcg/mL BELEN Final                   BLOOD CULTURE [[id number]] Collected:  02/05/20 1655    Order Status:  Completed Specimen:  Blood from Peripheral Updated:  02/06/20 0712     Significant Indicator NEG     Source BLD     Site PERIPHERAL     Culture Result No Growth  Note: Blood cultures are incubated for 5 days and  are monitored continuously.Positive blood cultures  are called to the RN and reported as soon as  they are identified.      Narrative:       Per Hospital Policy: Only change Specimen Src: to \"Line\" if  specified by physician order.  Left Hand    GRAM STAIN [[id number]] Collected:  02/05/20 1555    Order Status:  Completed Specimen:  Wound Updated:  02/05/20 2041     Significant Indicator .     Source WND     Site Right Great Toe Deep     Gram Stain Result Moderate WBCs.  Few Gram positive cocci.      Narrative:       Surgery - swabs received    GRAM STAIN [[id number]] Collected:  02/05/20 1618    Order Status:  Completed Specimen:  Wound Updated:  02/05/20 2040     Significant Indicator .     Source WND     Site Right Bone Toe Biopsy     Gram Stain Result Rare WBCs.  Few Gram positive cocci.      BLOOD CULTURE x2 [[id number]] Collected:  02/03/20 1632    Order Status:  Completed Specimen:  Blood from Peripheral Updated:  02/04/20 0637     Significant Indicator NEG     Source BLD     Site PERIPHERAL     Culture Result No Growth  Note: Blood cultures are incubated for 5 days and  are monitored continuously.Positive blood cultures  are called to the RN and reported as soon as  they are identified.      Narrative:       Per Hospital Policy: Only change Specimen Src: to \"Line\" if  specified by physician order.  Right AC    CULTURE WOUND W/ GRAM STAIN [[id number]]     " Order Status:  No result Specimen:  Wound from Toe                ASSESSMENT:   POD#1 s/p I&D of right foot abscess, POD#3 s/p R great toe amputation with Dr. Randall   Sutures well approximated, no drainage, 2+ pitting edema      PLAN:  Continue with daily wound care. 3 weeks post op follow up with LPS on 02/28     Wound care: Adaptic, gauze, and rolled gauze to be changed daily by bedside RN    Vascular status: Palpable pulses    Antibiotics: Per ID recommendation    Weight Bearing Status: Heel weight bearing    Offloading: Offloading boot  when ambulating    PT/OT : involved, last seen 2/7    Diabetes Education: involved, last seen 2/7    - Implications of loss of protective sensation (LOPS) discussed with patient- including increased risk for amputation.  Advised to check feet at least daily, moisturize feet, and to always wear protective foot wear.   -avoid trimming own nails. See podiatrist or certified foot and nail RN  -keep blood sugars <150 for improved wound healing    Plan to return to [facility] no further surgeries planned at this time      DISCHARGE PLAN:    Disposition: home    Follow-up: [facility] rounds [facility] on 2/28, sutures to be removed approximately 3 weeks post-op      Bee Parks R.N.    If any questions or concerns, please call [phone]

## 2020-02-09 LAB
GLUCOSE BLD-MCNC: 170 MG/DL (ref 65–99)
GLUCOSE BLD-MCNC: 187 MG/DL (ref 65–99)
GLUCOSE BLD-MCNC: 223 MG/DL (ref 65–99)
GLUCOSE BLD-MCNC: 224 MG/DL (ref 65–99)

## 2020-02-09 PROCEDURE — 700111 HCHG RX REV CODE 636 W/ 250 OVERRIDE (IP): Performed by: STUDENT IN AN ORGANIZED HEALTH CARE EDUCATION/TRAINING PROGRAM

## 2020-02-09 PROCEDURE — 82962 GLUCOSE BLOOD TEST: CPT

## 2020-02-09 PROCEDURE — 700102 HCHG RX REV CODE 250 W/ 637 OVERRIDE(OP): Performed by: INTERNAL MEDICINE

## 2020-02-09 PROCEDURE — 700102 HCHG RX REV CODE 250 W/ 637 OVERRIDE(OP): Performed by: STUDENT IN AN ORGANIZED HEALTH CARE EDUCATION/TRAINING PROGRAM

## 2020-02-09 PROCEDURE — A9270 NON-COVERED ITEM OR SERVICE: HCPCS | Performed by: STUDENT IN AN ORGANIZED HEALTH CARE EDUCATION/TRAINING PROGRAM

## 2020-02-09 PROCEDURE — 99233 SBSQ HOSP IP/OBS HIGH 50: CPT | Performed by: INTERNAL MEDICINE

## 2020-02-09 PROCEDURE — 770006 HCHG ROOM/CARE - MED/SURG/GYN SEMI*

## 2020-02-09 RX ADMIN — CEFAZOLIN SODIUM 2 G: 2 INJECTION, SOLUTION INTRAVENOUS at 04:50

## 2020-02-09 RX ADMIN — METFORMIN HYDROCHLORIDE 500 MG: 500 TABLET ORAL at 17:53

## 2020-02-09 RX ADMIN — LISINOPRIL 10 MG: 10 TABLET ORAL at 04:49

## 2020-02-09 RX ADMIN — INSULIN HUMAN 2 UNITS: 100 INJECTION, SOLUTION PARENTERAL at 21:17

## 2020-02-09 RX ADMIN — ACETAMINOPHEN 650 MG: 325 TABLET, FILM COATED ORAL at 02:01

## 2020-02-09 RX ADMIN — SENNOSIDES AND DOCUSATE SODIUM 2 TABLET: 8.6; 5 TABLET ORAL at 04:49

## 2020-02-09 RX ADMIN — CEFAZOLIN SODIUM 2 G: 2 INJECTION, SOLUTION INTRAVENOUS at 14:24

## 2020-02-09 RX ADMIN — INSULIN HUMAN 2 UNITS: 100 INJECTION, SOLUTION PARENTERAL at 09:01

## 2020-02-09 RX ADMIN — HYDROCODONE BITARTRATE AND ACETAMINOPHEN 1 TABLET: 10; 325 TABLET ORAL at 21:15

## 2020-02-09 RX ADMIN — ACETAMINOPHEN 650 MG: 325 TABLET, FILM COATED ORAL at 14:41

## 2020-02-09 RX ADMIN — METFORMIN HYDROCHLORIDE 500 MG: 500 TABLET ORAL at 09:03

## 2020-02-09 RX ADMIN — MULTIPLE VITAMINS W/ MINERALS TAB 1 TABLET: TAB at 04:49

## 2020-02-09 RX ADMIN — CEFAZOLIN SODIUM 2 G: 2 INJECTION, SOLUTION INTRAVENOUS at 21:13

## 2020-02-09 RX ADMIN — INSULIN HUMAN 3 UNITS: 100 INJECTION, SOLUTION PARENTERAL at 17:51

## 2020-02-09 RX ADMIN — INSULIN GLARGINE 10 UNITS: 100 INJECTION, SOLUTION SUBCUTANEOUS at 17:52

## 2020-02-09 RX ADMIN — INSULIN HUMAN 3 UNITS: 100 INJECTION, SOLUTION PARENTERAL at 12:34

## 2020-02-09 RX ADMIN — ATORVASTATIN CALCIUM 40 MG: 40 TABLET, FILM COATED ORAL at 04:49

## 2020-02-09 ASSESSMENT — ENCOUNTER SYMPTOMS
SENSORY CHANGE: 1
HEADACHES: 0
DIZZINESS: 0
DIARRHEA: 0
ABDOMINAL PAIN: 0
MYALGIAS: 1
CHILLS: 0
FEVER: 0
COUGH: 0
VOMITING: 0
SHORTNESS OF BREATH: 0
NAUSEA: 0

## 2020-02-09 NOTE — DISCHARGE SUMMARY
PATIENT DISCHARGE SUMMARY     Admit Date:  2/3/2020       Discharge Date:   2/14/2020      Service:   [facility] Team  Attending Physician(s):   Laura Silverio       Senior Resident(s):   Marlon Lewis  Cj Resident(s):   Urbano      Admission Diagnosis:   R foot edema, probable osteomyelitis  Sepsis  DM II      Discharge Diagnoses:                R distal 1st metatarsal and proximal phalanx osteomyelitis   S/p ray amputation 02/05   S/p washout and wound closure 02/07  MSSA bacteremia, resolved with 02/07 blood cultures negative  DM II  HTN    HPI Summary:       Pt presented to ED with worsening pain in R foot. Two weeks prior, he had stubbed his toe and present to ED; imaging at that time failed to reveal a fracture, and he was sent home. He later saw his PCP, and apparently there was not sign of infection at the time and the patient was again sent home. Finally, the pain continued increasing and the patient presented to the ED 02/03/20, where his foot was found to be very edematous with erythema spreading across the foot, and areas of fluctuance under thinned skin with visible pus underlying.     Patient /Hospital Summary:        Pt admitted to hospital. Found to have osteomyelitis of distal 1st metatarsal and proximal phalanx with abscess/ extensive soft tissue involvement. Also found to have sepsis (tachycardia, leukocytosis) and MSSA bacteremia. Pt underwent ray amputation 02/05 and subsequent wash-out and wound closure 02/07. He was provided with IV cefazolin (as well as earlier unasyn and vancomycin, which were discontinued) per ID recs. ID also recommended continuation of IV cefazolin x 6 weeks from date of negative blood culture; PICC line was therefore placed 72 hours after negative blood cultures. His pain was well controlled with minimal analgesics. He was seen by PT, who recommended 5x/ week therapy and d/c to SNF.     The patient's diabetes was noted to be relatively well controlled  on home metformin with HgbA1c 6.9 in January. His metformin was initially held per procedures and imaging, and he was maintained on insulin while inpatient. His metformin was restarted toward the end of his stay. Patient was also noted to be hypertensive during his stay; initially, this was ascribed to pain of infection. However, as the pain improved following surgery, the patient remained hypertensive. He was prescribed linisopril for HTN with good effect, and also renoprotection, as he had not previously been taking this medication.     Consultants:      Orthopedic surgery  Infectious disease    Procedures:        Right foot ray amputation (1st distal metatarsal and great toe) 02/05  Washout and wound closure 02/07    Imaging/ Testing:      EC-ECHOCARDIOGRAM COMPLETE W/O CONT   Final Result      MR-FOOT-WITH & W/O RIGHT   Final Result      First metatarsal and proximal phalanx osteomyelitis with possible necrosis. First metatarsal-phalangeal effusion from septic arthropathy is not seen, indicating the joint fluid is likely draining through the dorsal soft tissues      Dorsal lateral great toe skin ulceration with probable draining sinus and underlying necrotic tissue interposed between the first and second metatarsal shafts, wrapping under the plantar aspect of the proximal first metatarsal shaft      Medial forefoot cellulitis, myositis      Mild midfoot osteoarthritis      US-RENAL ARTERY DUPLEX COMP   Final Result      DX-FOOT-COMPLETE 3+ RIGHT   Final Result      Mildly increased soft tissue swelling.      Periarticular erosion most conspicuous about the great toe proximal phalanx which could represent osteomyelitis end your septic arthritis. MRI without and with contrast is recommended for further evaluation.          No results for input(s): WBC, RBC, HEMOGLOBIN, HEMATOCRIT, MCV, MCH, RDW, PLATELETCT, MPV, NEUTSPOLYS, LYMPHOCYTES, MONOCYTES, EOSINOPHILS, BASOPHILS, RBCMORPHOLO in the last 72 hours.  No  "results for input(s): SODIUM, POTASSIUM, CHLORIDE, CO2, GLUCOSE, BUN, CPKTOTAL in the last 72 hours.  No results for input(s): ALBUMIN, TBILIRUBIN, ALKPHOSPHAT, TOTPROTEIN, ALTSGPT, ASTSGOT, CREATININE in the last 72 hours.  Results     Procedure Component Value Units Date/Time    BLOOD CULTURE x2 [[id number]] Collected:  02/03/20 1632    Order Status:  Completed Specimen:  Blood from Peripheral Updated:  02/08/20 2100     Significant Indicator NEG     Source BLD     Site PERIPHERAL     Culture Result No growth after 5 days of incubation.    Narrative:       Per Hospital Policy: Only change Specimen Src: to \"Line\" if  specified by physician order.  Right AC    Anaerobic Culture [[id number]] Collected:  02/05/20 1618    Order Status:  Completed Specimen:  Wound Updated:  02/08/20 1352     Significant Indicator NEG     Source WND     Site Right Bone Toe Biopsy     Culture Result No Anaerobes isolated.    CULTURE WOUND W/ GRAM STAIN [[id number]]  (Abnormal) Collected:  02/05/20 1618    Order Status:  Completed Specimen:  Wound Updated:  02/08/20 1352     Significant Indicator POS     Source WND     Site Right Bone Toe Biopsy     Culture Result -     Gram Stain Result Rare WBCs.  Few Gram positive cocci.       Culture Result Staphylococcus aureus  Heavy growth  See previous culture for sensitivity report.      CULTURE WOUND W/ GRAM STAIN [[id number]]  (Abnormal)  (Susceptibility) Collected:  02/05/20 1555    Order Status:  Completed Specimen:  Wound Updated:  02/08/20 1352     Significant Indicator POS     Source WND     Site Right Great Toe Deep     Culture Result -     Gram Stain Result Moderate WBCs.  Few Gram positive cocci.       Culture Result Staphylococcus aureus  Heavy growth      Narrative:       Surgery - swabs received    Susceptibility     Staphylococcus aureus (1)     Antibiotic Interpretation Method Status    Azithromycin Sensitive BELEN Final    Clindamycin Sensitive BELEN Final    Cefazolin Sensitive BELEN Final " "   Ceftaroline Sensitive BELEN Final    Daptomycin Sensitive BELEN Final    Ampicillin/sulbactam Sensitive BELEN Final    Erythromycin Sensitive BELEN Final    Vancomycin Sensitive BELEN Final    Oxacillin Sensitive BELEN Final    Pip/Tazobactam Sensitive BELEN Final    Trimeth/Sulfa Sensitive BELEN Final    Tetracycline Sensitive BELEN Final                   Anaerobic Culture [[id number]] Collected:  02/05/20 1555    Order Status:  Completed Specimen:  Wound Updated:  02/08/20 1352     Significant Indicator NEG     Source WND     Site Right Great Toe Deep     Culture Result No Anaerobes isolated.    Narrative:       Surgery - swabs received    Blood Culture [[id number]] Collected:  02/07/20 1206    Order Status:  Completed Specimen:  Blood from Peripheral Updated:  02/08/20 0701     Significant Indicator NEG     Source BLD     Site PERIPHERAL     Culture Result No Growth  Note: Blood cultures are incubated for 5 days and  are monitored continuously.Positive blood cultures  are called to the RN and reported as soon as  they are identified.      Narrative:       Per Hospital Policy: Only change Specimen Src: to \"Line\" if  specified by physician order.  Left Forearm/Arm    Blood Culture [[id number]] Collected:  02/07/20 1206    Order Status:  Completed Specimen:  Blood from Peripheral Updated:  02/08/20 0701     Significant Indicator NEG     Source BLD     Site PERIPHERAL     Culture Result No Growth  Note: Blood cultures are incubated for 5 days and  are monitored continuously.Positive blood cultures  are called to the RN and reported as soon as  they are identified.      Narrative:       Per Hospital Policy: Only change Specimen Src: to \"Line\" if  specified by physician order.  Left AC    BLOOD CULTURE [[id number]]  (Abnormal) Collected:  02/05/20 1655    Order Status:  Completed Specimen:  Blood from Peripheral Updated:  02/07/20 1036     Significant Indicator POS     Source BLD     Site PERIPHERAL     Culture Result Growth detected by " "Bactec instrument. 02/06/2020  18:16      Staphylococcus aureus  See previous culture for sensitivity report.      Narrative:       CALL  Mccormick  131 tel. [phone],  CALLED  131 tel. [phone] 02/06/2020, 18:20, RB PERF. RESULTS CALLED  TO:[phone]  Per Hospital Policy: Only change Specimen Src: to \"Line\" if  specified by physician order.  Left Forearm/Arm    BLOOD CULTURE x2 [[id number]]  (Abnormal)  (Susceptibility) Collected:  02/03/20 1632    Order Status:  Completed Specimen:  Blood from Peripheral Updated:  02/06/20 0835     Significant Indicator POS     Source BLD     Site PERIPHERAL     Culture Result Growth detected by Bactec instrument. 02/04/2020  19:13  Staphylococcus aureus (methicillin sensitive)  detected by PCR.        Staphylococcus aureus    Narrative:       CALL  Mccormick  131 tel. [phone],  CALLED  131 tel. [phone] 02/04/2020, 19:16, RB PERF. RESULTS CALLED TO: RN  [phone]  Per Hospital Policy: Only change Specimen Src: to \"Line\" if  specified by physician order.  Right Hand    Susceptibility     Staphylococcus aureus (1)     Antibiotic Interpretation Method Status    Azithromycin Sensitive BELEN Final    Clindamycin Sensitive BELEN Final    Cefazolin Sensitive BELEN Final    Ceftaroline Sensitive BELEN Final    Daptomycin Sensitive BELEN Final    Ampicillin/sulbactam Sensitive BELEN Final    Erythromycin Sensitive BELEN Final    Vancomycin Sensitive BELEN Final    Oxacillin Sensitive BELEN Final    Pip/Tazobactam Sensitive BELEN Final    Trimeth/Sulfa Sensitive BELEN Final    Tetracycline Sensitive BELEN Final                   BLOOD CULTURE [[id number]] Collected:  02/05/20 1655    Order Status:  Completed Specimen:  Blood from Peripheral Updated:  02/06/20 0712     Significant Indicator NEG     Source BLD     Site PERIPHERAL     Culture Result No Growth  Note: Blood cultures are incubated for 5 days and  are monitored continuously.Positive blood cultures  are called to the RN and reported as soon as  they are " "identified.      Narrative:       Per Hospital Policy: Only change Specimen Src: to \"Line\" if  specified by physician order.  Left Hand    GRAM STAIN [[id number]] Collected:  02/05/20 1555    Order Status:  Completed Specimen:  Wound Updated:  02/05/20 2041     Significant Indicator .     Source WND     Site Right Great Toe Deep     Gram Stain Result Moderate WBCs.  Few Gram positive cocci.      Narrative:       Surgery - swabs received    GRAM STAIN [[id number]] Collected:  02/05/20 1618    Order Status:  Completed Specimen:  Wound Updated:  02/05/20 2040     Significant Indicator .     Source WND     Site Right Bone Toe Biopsy     Gram Stain Result Rare WBCs.  Few Gram positive cocci.      CULTURE WOUND W/ GRAM STAIN [[id number]]     Order Status:  No result Specimen:  Wound from Toe         EC-ECHOCARDIOGRAM COMPLETE W/O CONT   Final Result      MR-FOOT-WITH & W/O RIGHT   Final Result      First metatarsal and proximal phalanx osteomyelitis with possible necrosis. First metatarsal-phalangeal effusion from septic arthropathy is not seen, indicating the joint fluid is likely draining through the dorsal soft tissues      Dorsal lateral great toe skin ulceration with probable draining sinus and underlying necrotic tissue interposed between the first and second metatarsal shafts, wrapping under the plantar aspect of the proximal first metatarsal shaft      Medial forefoot cellulitis, myositis      Mild midfoot osteoarthritis      US-RENAL ARTERY DUPLEX COMP   Final Result      DX-FOOT-COMPLETE 3+ RIGHT   Final Result      Mildly increased soft tissue swelling.      Periarticular erosion most conspicuous about the great toe proximal phalanx which could represent osteomyelitis end your septic arthritis. MRI without and with contrast is recommended for further evaluation.          Discharge Medications:           Medication List      START taking these medications      Instructions   ceFAZolin in dextrose 2 g/100mL " IVPB  Commonly known as:  ANCEF   100 mL by Intravenous route every 8 hours for 35 days.  Dose:  2 g     HYDROcodone/acetaminophen  MG Tabs  Commonly known as:  NORCO   Take 1 Tab by mouth every 6 hours as needed for up to 10 days.  Dose:  1 Tab     insulin regular 100 Unit/mL Soln  Commonly known as:  HUMULIN R   Inject 5 Units as instructed 3 times a day before meals.  Dose:  5 Units     Lantus SoloStar 100 UNIT/ML Sopn injection  Generic drug:  insulin glargine   Inject 15 Units as instructed every evening.  Dose:  15 Units     lisinopril 10 MG Tabs  Start taking on:  February 15, 2020  Commonly known as:  PRINIVIL   Take 1 Tab by mouth every day.  Dose:  10 mg        CHANGE how you take these medications      Instructions   metformin 1000 MG tablet  What changed:    · medication strength  · how much to take  · when to take this  Commonly known as:  GLUCOPHAGE   Take 1 Tab by mouth 2 times a day, with meals.  Dose:  1,000 mg        CONTINUE taking these medications      Instructions   atorvastatin 40 MG Tabs  Commonly known as:  LIPITOR   Take 40 mg by mouth every day.  Dose:  40 mg     therapeutic multivitamin-minerals Tabs   Take 1 Tab by mouth every day.  Dose:  1 Tab        STOP taking these medications    naproxen 500 MG Tabs  Commonly known as:  NAPROSYN            Disposition:   Discharge to SNF      Instructions:         The patient was instructed to return to the ER in the event of worsening symptoms. I have counseled the patient on the importance of compliance and the patient has agreed to proceed with all medical recommendations and follow up plan indicated above.   The patient understands that all medications come with benefits and risks. Risks may include permanent injury or death and these risks can be minimized with close reassessment and monitoring.        Primary Care Provider:    Dr Reyes        Follow up appointment details :      Dr. Reyes  Orthopedic Surgery   [place]  Infectious Disease Dr. Conner        CC: Ubaldo Reyes Cassinelli

## 2020-02-09 NOTE — CARE PLAN
Problem: Communication  Goal: The ability to communicate needs accurately and effectively will improve  Outcome: PROGRESSING AS EXPECTED  Note:   A/Ox4. Able to make needs known. Bruneian is primary language. There does not appear to be any language barrier regarding english usage. Pt is able to hold conversations.      Problem: Safety  Goal: Will remain free from injury  Outcome: PROGRESSING AS EXPECTED  Note:   Bed in lowest position. Call light within reach. No impulsive behavior observed. Non skid socks on.      Problem: Infection  Goal: Will remain free from infection  Outcome: PROGRESSING AS EXPECTED  Note:   R foot incision. Dressing changed. Last WBC on 2/05. Afebrile. BP WINL.      Problem: Venous Thromboembolism (VTW)/Deep Vein Thrombosis (DVT) Prevention:  Goal: Patient will participate in Venous Thrombosis (VTE)/Deep Vein Thrombosis (DVT)Prevention Measures  Outcome: PROGRESSING AS EXPECTED  Note:   SCD's on.      Problem: Bowel/Gastric:  Goal: Normal bowel function is maintained or improved  Outcome: PROGRESSING AS EXPECTED  Note:   BM made today. Denies constipation or boating.      Problem: Pain Management  Goal: Pain level will decrease to patient's comfort goal  Outcome: PROGRESSING AS EXPECTED  Note:   Tylenol provided for R foot pain.      Problem: Mobility  Goal: Risk for activity intolerance will decrease  Outcome: PROGRESSING AS EXPECTED  Note:   Up with x1 person assist with FWW. Heel weight baring to R foot with offloading boot.      Problem: Acute Care of the Diabetic Patient  Goal: Optimal Outcome for the Diabetic Patient  Outcome: PROGRESSING AS EXPECTED  Note:   AC/HS checks. FSBS >200. Sliding scale provided.

## 2020-02-09 NOTE — CARE PLAN
Problem: Safety  Goal: Will remain free from injury  Outcome: PROGRESSING AS EXPECTED   Pt remains free from injury. Pt calls staff appropriately.   Problem: Pain Management  Goal: Pain level will decrease to patient's comfort goal  Outcome: PROGRESSING AS EXPECTED  Pt pain is controlled at his comfort level.

## 2020-02-09 NOTE — PROGRESS NOTES
[facility]GRESS NOTE     Attending: Dr. Sanchez    Senior Resident: Dr. Mason    Cj Resident: Dr. Clement    PATIENT: Rob Webster; [id number]; 1965 Hospital Day: 7    ID: 54 y.o. male PMH DM II admitted for R great toe swelling, erythema found to have abscess and proximal phalanx and distal 1st metatarsal osteomyelitis. Also with MSSA bacteremia as recently as 02/05 (repeat blood cx 02/07 pending, NGTD)  POD#4 s/p ray amputation  POD#2 s/p debridement and wound closure    SUBJECTIVE: No acute events overnight, pain remains well controlled. PT not by to ambulate with patient yesterday, and he hopes to be able to ambulate today.     Blood cultures from 02/07 remain NGTD.    Pt started on metformin and SS increased; BG remained elevated at 202-266.    Started on lisinopril yesterday morning with subsequent BPs 130s-140s/80s-90s.    OBJECTIVE:     Vitals:    02/08/20 0700 02/08/20 1534 02/08/20 2000 02/09/20 0446   BP: 150/91 138/86 149/91 134/83   Pulse: 86 89 87 82   Resp: 18 18 18 18   Temp: 36.7 °C (98 °F) 36.9 °C (98.5 °F) 36.8 °C (98.3 °F) 36.3 °C (97.3 °F)   TempSrc: Temporal Temporal Temporal Temporal   SpO2: 94% 95% 94% 94%   Weight:       Height:           Intake/Output Summary (Last 24 hours) at 2/9/2020 0638  Last data filed at 2/9/2020 0200  Gross per 24 hour   Intake 720 ml   Output 2399 ml   Net -1679 ml       PE:  General: No acute distress, resting comfortably in bed and reading on his phone.  HEENT: Normocephalic, atraumatic. EOMI. MMM  Cardiovascular: RRR with no M/R/G. Loud S2  Respiratory: Symmetrical chest. Clear to auscultation bilaterally with no wheezes, rales or rhonchi  Abdomen: soft, non-tender, non-distended no masses, +BS   EXT:  Moves all extremities. Clean dressing present on the right foot.  Neuro: non focal, sensation grossly intact    LABS:  No results for input(s): WBC, RBC, HEMOGLOBIN, HEMATOCRIT, MCV, MCH, RDW, PLATELETCT, MPV, NEUTSPOLYS,  LYMPHOCYTES, MONOCYTES, EOSINOPHILS, BASOPHILS, RBCMORPHOLO in the last 72 hours.  No results for input(s): SODIUM, POTASSIUM, CHLORIDE, CO2, BUN, CREATININE, CALCIUM, MAGNESIUM, PHOSPHORUS, ALBUMIN in the last 72 hours.  Estimated GFR/CRCL = Estimated Creatinine Clearance: 109.7 mL/min (by C-G formula based on SCr of 0.72 mg/dL).  Recent Labs     02/08/20  1228 02/08/20  1707 02/08/20 2055   POCGLUCOSE 255* 259* 266*                 No results for input(s): INR, APTT, FIBRINOGEN in the last 72 hours.    Invalid input(s): DIMER    MICROBIOLOGY: repeat blood cx 02/05 pos for MSSA; repeat blood cx 02/07 NGTD     IMAGING: none new    MEDS:  Current Facility-Administered Medications   Medication Last Dose   • insulin regular (HUMULIN R) injection 2-9 Units 5 Units at 02/08/20 2056    And   • glucose 4 g chewable tablet 16 g      And   • DEXTROSE 10% BOLUS 250 mL     • lisinopril (PRINIVIL) 10 MG tablet 10 mg 10 mg at 02/09/20 0449   • metFORMIN (GLUCOPHAGE) tablet 500 mg 500 mg at 02/08/20 1820   • morphine (pf) 4 MG/ML injection 2 mg     • ceFAZolin in dextrose (ANCEF) IVPB premix 2 g Stopped at 02/09/20 0520   • insulin glargine (LANTUS) injection 10 Units 10 Units at 02/08/20 1814   • senna-docusate (PERICOLACE or SENOKOT S) 8.6-50 MG per tablet 2 Tab 2 Tab at 02/09/20 0449    And   • polyethylene glycol/lytes (MIRALAX) PACKET 1 Packet 1 Packet at 02/04/20 2038    And   • magnesium hydroxide (MILK OF MAGNESIA) suspension 30 mL      And   • bisacodyl (DULCOLAX) suppository 10 mg     • acetaminophen (TYLENOL) tablet 650 mg 650 mg at 02/09/20 0201   • atorvastatin (LIPITOR) tablet 40 mg 40 mg at 02/09/20 0449   • therapeutic multivitamin-minerals (THERAGRAN-M) tablet 1 Tab 1 Tab at 02/09/20 0449   • HYDROcodone/acetaminophen (NORCO)  MG per tablet 1 Tab 1 Tab at 02/08/20 2103       PROBLEM LIST:  No problems updated.    ASSESSMENT/PLAN:   54 y.o. male PMH DM II admitted for R great toe swelling, erythema found to  have abscess and proximal phalanx and distal 1st metatarsal osteomyelitis. Also with MSSA bacteremia as recently as 02/05 (repeat blood cx 02/07 pending, NGTD)  POD#4 s/p ray amputation  POD#2 s/p debridement and wound closure        #Osteomyelitis of R great toe and distal 1st metatarsal  #Abscess distal/ medial right foot  #S/p ray amputation 02/05, I&D with wound closure 02/07  #Last tetanus vaccine 12 years prior  S/p tetanus vaccine here in hospital. POD #4 s/p ray amputation and POD #2 with wound closure. Heel-touch weight bearing per ortho. ID with recs for IV cefazolin x 6 weeks minimum. PT with recs for d/c to post-acute facility for 5x/ week PT.   - Continue ancef, per ID will need 6 weeks IV abx  - Tylenol, norco, morphine PRN for pain  - Continue to f/u blood cultures/wound cultures  - PT following with recs for 5x/ week PT   - OK to heel weight bear per Ortho  - PICC line to be inserted once neg blood cx x 72 hours        #MSSA Bacteremia  1 of 2 blood culture bottles positive for MSSA, pan sensitive; repeat blood culture 02/05 with prelim growth of gram pos cocci, possibly staph. Note that these cultures were drawn before source control. Repeat blood cx 02/07 NGTD.  ID with recs for ancef x 6 weeks.  Echo 02/06 WNL and no vegetations.   - Continue ancef as above  - F/u blood cx results        #NIDDM  #HTN  A1c 6.9 in Jan 2020. Metformin 500 BID at home. Currently lantus 10 U qHS, and ISS (SS increased last 24). BG still 200s with SS change; however metformin just restarted last dose. Patient diagnosed with DMII in May 2019; states he likely has had it for a long time but did not have a PCP until June 2019. Has never been diagnosed with HTN in the past but BP has been consistently elevated since hospitalization. At first, thought 2/2 pain but now pain very well controlled and BPs still elevated.   - Will follow insulin needs while eating and adjust as needed  - Increase SS  - Metformin restarted 02/08  PM  - If not better control by this evening, increase long-acting insulin  - Start lisinopril for HTN and renoprotection; will discharge patient with this medication     #HLD  - Continue home atorvastatin 40 qD     Lines: PIV  IVF: -  Abx: Ancef  GI PPx: multimodal; last BM 02/08  DVT PPx: SCDs  Code: full  Diet: diabetic diet        Dispo: for IV abx, pending PICC line placement once blood cx neg x 72 hours, and eventual d/c to post-acute setting for continued PT     Michelle Clement MD, PGY1

## 2020-02-09 NOTE — PROGRESS NOTES
Infectious Disease Progress Note    Author: Kaylan Cooper M.D. Date & Time of service: 2020  2:11 PM    Chief Complaint:  Follow-up for MSSA sepsis/right foot osteomyelitis    Interval History:  54-year-old diabetic male with recent first right toe traumatic injury on 2020 admitted 2/3/2020 with a 2-week history of worsening right first toe inflammation, erythema and pain   afebrile, no CBC.  Patient resting comfortably with no new complaints.  Family at bedside.  Plan of care discussed in detail with patient at bedside.  He states he will be transferred to a nursing skilled facility at discharge.   AF WBC12.5 states pain controlled-denies SE abx Appetite good  Labs Reviewed and Medications Reviewed.    Review of Systems:  Review of Systems   Constitutional: Negative for chills and fever.   Respiratory: Negative for cough and shortness of breath.    Gastrointestinal: Negative for abdominal pain, diarrhea, nausea and vomiting.   Musculoskeletal: Positive for myalgias.   Neurological: Positive for sensory change. Negative for dizziness and headaches.   All other systems reviewed and are negative.      Hemodynamics:  Temp (24hrs), Av.6 °C (97.9 °F), Min:36.3 °C (97.3 °F), Max:36.9 °C (98.5 °F)  Temperature: (q8 v/s per RN)  Pulse  Av.4  Min: 81  Max: 107   Blood Pressure: 136/83       Physical Exam:  Physical Exam  Vitals signs and nursing note reviewed.   Constitutional:       General: He is not in acute distress.     Appearance: He is not ill-appearing or toxic-appearing.   HENT:      Nose: No rhinorrhea.      Mouth/Throat:      Mouth: Mucous membranes are moist.      Pharynx: No oropharyngeal exudate.   Eyes:      General: No scleral icterus.     Extraocular Movements: Extraocular movements intact.      Pupils: Pupils are equal, round, and reactive to light.   Neck:      Musculoskeletal: Neck supple.   Cardiovascular:      Rate and Rhythm: Regular rhythm. Tachycardia present.       Pulses: Normal pulses.   Pulmonary:      Effort: Pulmonary effort is normal. No respiratory distress.      Breath sounds: Normal breath sounds.   Abdominal:      General: There is no distension.      Palpations: Abdomen is soft.      Tenderness: There is no tenderness.   Musculoskeletal:      Right lower leg: No edema.      Left lower leg: No edema.      Comments: Right foot and surgical dressing   Lymphadenopathy:      Cervical: No cervical adenopathy.   Skin:     General: Skin is warm.      Coloration: Skin is not jaundiced.   Neurological:      General: No focal deficit present.      Mental Status: He is alert and oriented to person, place, and time.      Cranial Nerves: No cranial nerve deficit.   Psychiatric:         Mood and Affect: Mood normal.         Behavior: Behavior normal.      Comments: Pleasant         Meds:    Current Facility-Administered Medications:   •  insulin regular **AND** Accu-Chek ACHS **AND** NOTIFY MD and PharmD **AND** glucose **AND** dextrose 10% bolus  •  lisinopril  •  metFORMIN  •  morphine injection  •  ceFAZolin  •  insulin glargine **AND** [DISCONTINUED] insulin lispro **AND** Accu-Chek Q6 if NPO **AND** NOTIFY MD and PharmD **AND** [DISCONTINUED] glucose **AND** [DISCONTINUED] dextrose 10% bolus  •  senna-docusate **AND** polyethylene glycol/lytes **AND** magnesium hydroxide **AND** bisacodyl  •  acetaminophen  •  atorvastatin  •  therapeutic multivitamin-minerals  •  HYDROcodone/acetaminophen    Labs:  No results for input(s): WBC, RBC, HEMOGLOBIN, HEMATOCRIT, MCV, MCH, RDW, PLATELETCT, MPV, NEUTSPOLYS, LYMPHOCYTES, MONOCYTES, EOSINOPHILS, BASOPHILS, RBCMORPHOLO in the last 72 hours.  No results for input(s): SODIUM, POTASSIUM, CHLORIDE, CO2, GLUCOSE, BUN, CPKTOTAL in the last 72 hours.  No results for input(s): ALBUMIN, TBILIRUBIN, ALKPHOSPHAT, TOTPROTEIN, ALTSGPT, ASTSGOT, CREATININE in the last 72 hours.    Imaging:  Dx-foot-complete 3+ Right    Result Date:  2/3/2020  2/3/2020 6:28 PM HISTORY/REASON FOR EXAM:  Pain/Deformity Following Trauma Redness, swelling and pain TECHNIQUE/EXAM DESCRIPTION AND NUMBER OF VIEWS: 3 views of the RIGHT foot. COMPARISON:  1/20/2020 FINDINGS: There is no fracture or dislocation. The visualized osseous structures are in anatomic alignment. There are mild degenerative changes of the great toe MTP joint. There is some soft tissue swelling of the great toe and in the dorsum of the foot. There is suggestion of small marginal erosion medial proximal phalangeal base of the great toe, not well seen on prior study. Cannot exclude osteomyelitis or septic arthritis. MRI without and with contrast is recommended for further evaluation. Achilles and plantar calcaneal spurs. Extensive atherosclerosis.     Mildly increased soft tissue swelling. Periarticular erosion most conspicuous about the great toe proximal phalanx which could represent osteomyelitis end your septic arthritis. MRI without and with contrast is recommended for further evaluation.    Dx-foot-complete 3+ Right    Result Date: 1/20/2020 1/20/2020 5:58 AM HISTORY/REASON FOR EXAM:  Pain/Deformity Following Trauma Great toe injury, pain TECHNIQUE/EXAM DESCRIPTION AND NUMBER OF VIEWS: 3 views of the RIGHT foot. COMPARISON:  None. FINDINGS: There is no fracture or dislocation. Small calcific density adjacent to the distal phalangeal neck of the great toe measuring 3 mm of uncertain etiology or significance. The visualized osseous structures are in anatomic alignment. Minimal degenerative changes great toe MTP joint within the interphalangeal joints. Plantar and Achilles calcaneal spurs. Bone mineralization is age-appropriate.. Prominent atherosclerotic arterial calcifications. Soft tissue swelling dorsum of the foot.     Soft tissue swelling without acute osseous abnormality. If pain persists, repeat radiographs in 7-10 days is recommended.    Mr-foot-with & W/o Right    Result Date:  2/4/2020 2/4/2020 1:46 PM HISTORY/REASON FOR EXAM:  Osteomyelitis suspected, diabetic. Great toe swelling and pain for 2 weeks. TECHNIQUE/EXAM DESCRIPTION: MRI of the RIGHT foot with and without contrast. Using a [employer] Signa 1.5 Rosa MRI scanner, T1 axial, sagittal, and coronal, fast spin-echo T2 fat-suppressed axial and coronal, fast inversion recovery sagittal, and T1 fat suppressed axial and sagittal post intravenous contrast images were obtained. A total of 15 mL ProHance given. COMPARISON:  Radiographs February 3. FINDINGS: There is considerable first ray abnormality. There is fatty marrow replacement with increased T2, decreased T1 signal throughout the first metatarsal and proximal phalanx. The abnormal signal extends all the way to the first metatarsal base. There is no abnormal first TMT effusion. There is patchy bony enhancement in the distal first metatarsal neck, head and just anterior to the metatarsal base. There are some areas of absent enhancement at the plantar first metatarsal base as well as in the lateral metatarsal head which could indicate necrosis. There is edema and enhancement tracking along the metatarsal shaft but this has a rind of absent enhancement measuring up to 7 mm and this is compatible with necrotic tissue favored over abscess because this does not have as high a T2 signal as would be expected for abscess. Both are possible. There is absent enhancement which wraps around the lateral margin of the mid metatarsal shaft into the dorsal forefoot soft tissues anteriorly where there is a dorsal proximal phalanx level area of skin ulceration  and absent enhancement. This may be a draining sinus There is mild edema and enhancement centered at the medial intercuneiform articulation and this is most likely degenerative The remainder of the bony structures are normal. There is no subluxation or bony fragmentation. There is diffuse forefoot and midfoot soft tissue edema especially along the  dorsum subcutaneous fat. There is skin thickening and enhancement anteromedially in the forefoot. There is some intrinsic musculature enhancement centered at the first but also involving the second and third rays No abnormal joint effusion is seen No abnormal flexor or extensor tendon signal is noted     First metatarsal and proximal phalanx osteomyelitis with possible necrosis. First metatarsal-phalangeal effusion from septic arthropathy is not seen, indicating the joint fluid is likely draining through the dorsal soft tissues Dorsal lateral great toe skin ulceration with probable draining sinus and underlying necrotic tissue interposed between the first and second metatarsal shafts, wrapping under the plantar aspect of the proximal first metatarsal shaft Medial forefoot cellulitis, myositis Mild midfoot osteoarthritis    Ec-echocardiogram Complete W/o Cont    Result Date: 2/6/2020  Transthoracic Echo Report Echocardiography Laboratory CONCLUSIONS No prior study is available for comparison. Normal left ventricular systolic function. No evidence of valvular abnormality based on Doppler evaluation.Time             LV EF:  65    % FINDINGS Left Ventricle Normal left ventricular chamber size. Normal left ventricular wall thickness. Normal left ventricular systolic function. Left ventricular ejection fraction is visually estimated to be 65%. Normal regional wall motion. Normal diastolic function. Right Ventricle Normal right ventricular size and systolic function. Right Atrium Normal right atrial size. Normal inferior vena cava size and inspiratory collapse. Left Atrium Normal left atrial size. Left atrial volume index is 28 mL/sq m. Mitral Valve Structurally normal mitral valve. No mitral stenosis. Trace mitral regurgitation. Aortic Valve Structurally normal aortic valve. No aortic stenosis. No aortic insufficiency. Tricuspid Valve Structurally normal tricuspid valve. No tricuspid stenosis. Trace tricuspid  "regurgitation. Unable to estimate pulmonary artery pressure due to an inadequate tricuspid regurgitant jet. Pulmonic Valve Structurally normal pulmonic valve. No pulmonic stenosis. No pulmonic insufficiency. Pericardium No pericardial effusion seen. Aorta Normal aortic root for body surface area. Ascending aorta diameter is 3.2 cm. Robelcintialucho Nolasco (Electronically Signed) Final Date:     06 February 2020                 12:09    Us-renal Artery Duplex Comp    Result Date: 2/4/2020  Renal Artery Duplex  Ultrasound Report  Vascular Laboratory  CONCLUSIONS  No evidence of renal artery stenosis.                     Appearance                          Normal           Normal                    Perfusion Scan                      Normal  FINDINGS  Widening of the abdominal aorta to a maximum diameter of 2.6 cm.  Velocities and waveforms are normal through the bilateral renal arteries.  Waveforms of the bilateral renal veins are normal.  Resistive indices are normal at the bilateral renal zhou.  Bilateral kidney size, perfusion and tissue densities appear normal.  Renal aortic ratios are within normal limits: Right: 0.6 to 1.4, Left: 0.4 to   0.75.  Isidro Rodriguez MD  (Electronically Signed)  Final Date:      04 February 2020 14:39      Micro:  Results     Procedure Component Value Units Date/Time    BLOOD CULTURE x2 [[id number]] Collected:  02/03/20 1632    Order Status:  Completed Specimen:  Blood from Peripheral Updated:  02/08/20 2100     Significant Indicator NEG     Source BLD     Site PERIPHERAL     Culture Result No growth after 5 days of incubation.    Narrative:       Per Hospital Policy: Only change Specimen Src: to \"Line\" if  specified by physician order.  Right AC    Anaerobic Culture [[id number]] Collected:  02/05/20 1618    Order Status:  Completed Specimen:  Wound Updated:  02/08/20 1352     Significant Indicator NEG     Source WND     Site Right Bone Toe Biopsy     Culture Result No Anaerobes " isolated.    CULTURE WOUND W/ GRAM STAIN [[id number]]  (Abnormal) Collected:  02/05/20 1618    Order Status:  Completed Specimen:  Wound Updated:  02/08/20 1352     Significant Indicator POS     Source WND     Site Right Bone Toe Biopsy     Culture Result -     Gram Stain Result Rare WBCs.  Few Gram positive cocci.       Culture Result Staphylococcus aureus  Heavy growth  See previous culture for sensitivity report.      CULTURE WOUND W/ GRAM STAIN [[id number]]  (Abnormal)  (Susceptibility) Collected:  02/05/20 1555    Order Status:  Completed Specimen:  Wound Updated:  02/08/20 1352     Significant Indicator POS     Source WND     Site Right Great Toe Deep     Culture Result -     Gram Stain Result Moderate WBCs.  Few Gram positive cocci.       Culture Result Staphylococcus aureus  Heavy growth      Narrative:       Surgery - swabs received    Susceptibility     Staphylococcus aureus (1)     Antibiotic Interpretation Microscan Method Status    Azithromycin Sensitive <=2 mcg/mL BELEN Final    Clindamycin Sensitive <=0.5 mcg/mL BELEN Final    Cefazolin Sensitive <=8 mcg/mL BELEN Final    Ceftaroline Sensitive <=0.5 mcg/mL BELEN Final    Daptomycin Sensitive <=1 mcg/mL BELEN Final    Ampicillin/sulbactam Sensitive <=8/4 mcg/mL BELEN Final    Erythromycin Sensitive <=0.25 mcg/mL BELEN Final    Vancomycin Sensitive 1 mcg/mL BELEN Final    Oxacillin Sensitive <=0.25 mcg/mL BELEN Final    Pip/Tazobactam Sensitive <=4 mcg/mL BELEN Final    Trimeth/Sulfa Sensitive <=0.5/9.5 mcg/mL BELEN Final    Tetracycline Sensitive <=4 mcg/mL BELEN Final                   Anaerobic Culture [[id number]] Collected:  02/05/20 1555    Order Status:  Completed Specimen:  Wound Updated:  02/08/20 1352     Significant Indicator NEG     Source WND     Site Right Great Toe Deep     Culture Result No Anaerobes isolated.    Narrative:       Surgery - swabs received    Blood Culture [[id number]] Collected:  02/07/20 1206    Order Status:  Completed Specimen:  Blood from  "Peripheral Updated:  02/08/20 0701     Significant Indicator NEG     Source BLD     Site PERIPHERAL     Culture Result No Growth  Note: Blood cultures are incubated for 5 days and  are monitored continuously.Positive blood cultures  are called to the RN and reported as soon as  they are identified.      Narrative:       Per Hospital Policy: Only change Specimen Src: to \"Line\" if  specified by physician order.  Left Forearm/Arm    Blood Culture [[id number]] Collected:  02/07/20 1206    Order Status:  Completed Specimen:  Blood from Peripheral Updated:  02/08/20 0701     Significant Indicator NEG     Source BLD     Site PERIPHERAL     Culture Result No Growth  Note: Blood cultures are incubated for 5 days and  are monitored continuously.Positive blood cultures  are called to the RN and reported as soon as  they are identified.      Narrative:       Per Hospital Policy: Only change Specimen Src: to \"Line\" if  specified by physician order.  Left AC    BLOOD CULTURE [[id number]]  (Abnormal) Collected:  02/05/20 1655    Order Status:  Completed Specimen:  Blood from Peripheral Updated:  02/07/20 1036     Significant Indicator POS     Source BLD     Site PERIPHERAL     Culture Result Growth detected by Bactec instrument. 02/06/2020  18:16      Staphylococcus aureus  See previous culture for sensitivity report.      Narrative:       CALL  Mccormick  131 tel. [phone],  CALLED  131 tel. [phone] 02/06/2020, 18:20, RB PERF. RESULTS CALLED  TO:[id number]  Per Hospital Policy: Only change Specimen Src: to \"Line\" if  specified by physician order.  Left Forearm/Arm    BLOOD CULTURE x2 [[id number]]  (Abnormal)  (Susceptibility) Collected:  02/03/20 1632    Order Status:  Completed Specimen:  Blood from Peripheral Updated:  02/06/20 0835     Significant Indicator POS     Source BLD     Site PERIPHERAL     Culture Result Growth detected by Bactec instrument. 02/04/2020  19:13  Staphylococcus aureus (methicillin sensitive)  detected by " "PCR.        Staphylococcus aureus    Narrative:       CALL  Mccormick  131 tel. [phone],  CALLED  131 tel. [phone] 02/04/2020, 19:16, RB PERF. RESULTS CALLED TO: RN  [phone]  Per Hospital Policy: Only change Specimen Src: to \"Line\" if  specified by physician order.  Right Hand    Susceptibility     Staphylococcus aureus (1)     Antibiotic Interpretation Microscan Method Status    Azithromycin Sensitive <=2 mcg/mL BELEN Final    Clindamycin Sensitive <=0.5 mcg/mL BELEN Final    Cefazolin Sensitive <=8 mcg/mL BELEN Final    Ceftaroline Sensitive <=0.5 mcg/mL BELEN Final    Daptomycin Sensitive <=1 mcg/mL BELEN Final    Ampicillin/sulbactam Sensitive <=8/4 mcg/mL BELEN Final    Erythromycin Sensitive <=0.25 mcg/mL BELEN Final    Vancomycin Sensitive 1 mcg/mL BELEN Final    Oxacillin Sensitive <=0.25 mcg/mL BELEN Final    Pip/Tazobactam Sensitive <=4 mcg/mL BELEN Final    Trimeth/Sulfa Sensitive <=0.5/9.5 mcg/mL BELEN Final    Tetracycline Sensitive <=4 mcg/mL BELEN Final                   BLOOD CULTURE [[id number]] Collected:  02/05/20 1655    Order Status:  Completed Specimen:  Blood from Peripheral Updated:  02/06/20 0712     Significant Indicator NEG     Source BLD     Site PERIPHERAL     Culture Result No Growth  Note: Blood cultures are incubated for 5 days and  are monitored continuously.Positive blood cultures  are called to the RN and reported as soon as  they are identified.      Narrative:       Per Hospital Policy: Only change Specimen Src: to \"Line\" if  specified by physician order.  Left Hand    GRAM STAIN [[id number]] Collected:  02/05/20 1555    Order Status:  Completed Specimen:  Wound Updated:  02/05/20 2041     Significant Indicator .     Source WND     Site Right Great Toe Deep     Gram Stain Result Moderate WBCs.  Few Gram positive cocci.      Narrative:       Surgery - swabs received    GRAM STAIN [[id number]] Collected:  02/05/20 1618    Order Status:  Completed Specimen:  Wound Updated:  02/05/20 2040     Significant " Indicator .     Source WND     Site Right Bone Toe Biopsy     Gram Stain Result Rare WBCs.  Few Gram positive cocci.      CULTURE WOUND W/ GRAM STAIN [[id number]]     Order Status:  No result Specimen:  Wound from Toe       FINAL DIAGNOSIS:    A. Right great toe:         Section specimen demonstrates benign skin and subcutaneous          tissue with area of ulceration, associated marked acute          inflammation, necrosis and fibrinopurulent debris.         Underlying bone demonstrates areas of acute inflammation          consistent with osteomyelitis.         Skin/soft tissue margin demonstrates areas of acute inflammation          and fibropurulent debris.         Bone resection margin demonstrates area of necrotic debris and          few scattered neutrophils focally extending to the inked          resection margin.  B. Right bone toe biopsy:         Small fragments of benign bone with intervening fibrofatty          marrow spaces demonstrating necrotic debris, few small          aggregates of neutrophils and reactive changes, overall          findings suspicious for osteomyelitis.       Assessment:  Active Hospital Problems    Diagnosis   • Cellulitis of toe, right [L03.031]   • Osteomyelitis of great toe of right foot ([facility]) [M86.9]   • DM (diabetes mellitus) ([facility]) [E11.9]   • Hyperlipidemia [E78.5]       Pertinent diagnoses:  Right great toe osteomyelitis  MSSA sepsis  Type 2 diabetes mellitus, HgA1c 6.9  Leukocytosis     Plan:  - Continue IV cefazolin 2 g every 8 hours  - Blood cultures on 2/3,  2/5 - MSSA  -Repeat blood cultures on 2/7 - NGTD  - TTE - negative. Recommend BRYN if feasible  -s/p I&D down to bone, right great toe amputation with wound VAC placement on 2/5 with Dr. Brower.  Gross purulence noted per the operative note. Path +OM  - s/p repeat I&D with wound closure on 2/7 by Dr. Randall  -Anticipate a 6 weeks of IV antibiotics for osteomyelitis from date of 1st negative BCx  --PICC line  once repeat Bcx negative for at least 72 hours. Will order tomorrow  -Blood sugar control to control infection and promote wound healing.  Anticipated stop date abx 3/20/2020      I have performed a physical exam and reviewed and updated ROS and plan today 2/9/2020.  In review of yesterday's note 2/8/2020, there are no changes except as documented above.

## 2020-02-10 ENCOUNTER — APPOINTMENT (OUTPATIENT)
Dept: RADIOLOGY | Facility: MEDICAL CENTER | Age: 55
DRG: 854 | End: 2020-02-10
Attending: INTERNAL MEDICINE
Payer: COMMERCIAL

## 2020-02-10 ENCOUNTER — APPOINTMENT (OUTPATIENT)
Dept: RADIOLOGY | Facility: MEDICAL CENTER | Age: 55
DRG: 854 | End: 2020-02-10
Attending: STUDENT IN AN ORGANIZED HEALTH CARE EDUCATION/TRAINING PROGRAM
Payer: COMMERCIAL

## 2020-02-10 LAB
BACTERIA BLD CULT: NORMAL
GLUCOSE BLD-MCNC: 155 MG/DL (ref 65–99)
GLUCOSE BLD-MCNC: 175 MG/DL (ref 65–99)
GLUCOSE BLD-MCNC: 181 MG/DL (ref 65–99)
GLUCOSE BLD-MCNC: 266 MG/DL (ref 65–99)
SIGNIFICANT IND 70042: NORMAL
SITE SITE: NORMAL
SOURCE SOURCE: NORMAL

## 2020-02-10 PROCEDURE — A9270 NON-COVERED ITEM OR SERVICE: HCPCS | Performed by: STUDENT IN AN ORGANIZED HEALTH CARE EDUCATION/TRAINING PROGRAM

## 2020-02-10 PROCEDURE — 700102 HCHG RX REV CODE 250 W/ 637 OVERRIDE(OP): Performed by: STUDENT IN AN ORGANIZED HEALTH CARE EDUCATION/TRAINING PROGRAM

## 2020-02-10 PROCEDURE — 36573 INSJ PICC RS&I 5 YR+: CPT

## 2020-02-10 PROCEDURE — 700111 HCHG RX REV CODE 636 W/ 250 OVERRIDE (IP): Performed by: INTERNAL MEDICINE

## 2020-02-10 PROCEDURE — 02HV33Z INSERTION OF INFUSION DEVICE INTO SUPERIOR VENA CAVA, PERCUTANEOUS APPROACH: ICD-10-PCS | Performed by: FAMILY MEDICINE

## 2020-02-10 PROCEDURE — 700111 HCHG RX REV CODE 636 W/ 250 OVERRIDE (IP): Performed by: STUDENT IN AN ORGANIZED HEALTH CARE EDUCATION/TRAINING PROGRAM

## 2020-02-10 PROCEDURE — 99233 SBSQ HOSP IP/OBS HIGH 50: CPT | Performed by: INTERNAL MEDICINE

## 2020-02-10 PROCEDURE — 82962 GLUCOSE BLOOD TEST: CPT | Mod: 91

## 2020-02-10 PROCEDURE — B548ZZA ULTRASONOGRAPHY OF SUPERIOR VENA CAVA, GUIDANCE: ICD-10-PCS | Performed by: FAMILY MEDICINE

## 2020-02-10 PROCEDURE — 770006 HCHG ROOM/CARE - MED/SURG/GYN SEMI*

## 2020-02-10 RX ORDER — INSULIN GLARGINE 100 [IU]/ML
15 INJECTION, SOLUTION SUBCUTANEOUS EVERY EVENING
Status: DISCONTINUED | OUTPATIENT
Start: 2020-02-10 | End: 2020-02-14 | Stop reason: HOSPADM

## 2020-02-10 RX ADMIN — MULTIPLE VITAMINS W/ MINERALS TAB 1 TABLET: TAB at 04:54

## 2020-02-10 RX ADMIN — SENNOSIDES AND DOCUSATE SODIUM 2 TABLET: 8.6; 5 TABLET ORAL at 04:54

## 2020-02-10 RX ADMIN — LISINOPRIL 10 MG: 10 TABLET ORAL at 04:54

## 2020-02-10 RX ADMIN — INSULIN HUMAN 5 UNITS: 100 INJECTION, SOLUTION PARENTERAL at 13:05

## 2020-02-10 RX ADMIN — INSULIN GLARGINE 15 UNITS: 100 INJECTION, SOLUTION SUBCUTANEOUS at 18:11

## 2020-02-10 RX ADMIN — CEFAZOLIN SODIUM 2 G: 2 INJECTION, SOLUTION INTRAVENOUS at 21:48

## 2020-02-10 RX ADMIN — INSULIN HUMAN 2 UNITS: 100 INJECTION, SOLUTION PARENTERAL at 07:58

## 2020-02-10 RX ADMIN — HYDROCODONE BITARTRATE AND ACETAMINOPHEN 1 TABLET: 10; 325 TABLET ORAL at 21:47

## 2020-02-10 RX ADMIN — CEFAZOLIN SODIUM 2 G: 2 INJECTION, SOLUTION INTRAVENOUS at 13:11

## 2020-02-10 RX ADMIN — CEFAZOLIN SODIUM 2 G: 2 INJECTION, SOLUTION INTRAVENOUS at 04:52

## 2020-02-10 RX ADMIN — INSULIN HUMAN 6 UNITS: 100 INJECTION, SOLUTION PARENTERAL at 21:52

## 2020-02-10 RX ADMIN — INSULIN HUMAN 6 UNITS: 100 INJECTION, SOLUTION PARENTERAL at 18:10

## 2020-02-10 RX ADMIN — ATORVASTATIN CALCIUM 40 MG: 40 TABLET, FILM COATED ORAL at 04:54

## 2020-02-10 RX ADMIN — METFORMIN HYDROCHLORIDE 500 MG: 500 TABLET ORAL at 18:15

## 2020-02-10 RX ADMIN — METFORMIN HYDROCHLORIDE 500 MG: 500 TABLET ORAL at 08:00

## 2020-02-10 ASSESSMENT — ENCOUNTER SYMPTOMS
FEVER: 0
SHORTNESS OF BREATH: 0
DIARRHEA: 0
SENSORY CHANGE: 1
ABDOMINAL PAIN: 0
CHILLS: 0
NAUSEA: 0
VOMITING: 0
DIZZINESS: 0
HEADACHES: 0
COUGH: 0
MYALGIAS: 1

## 2020-02-10 NOTE — PROGRESS NOTES
Infectious Disease Progress Note    Author: Kaylan Cooper M.D. Date & Time of service: 2/10/2020  1:42 PM    Chief Complaint:  Follow-up for MSSA sepsis/right foot osteomyelitis    Interval History:  54-year-old diabetic male with recent first right toe traumatic injury on 2020 admitted 2/3/2020 with a 2-week history of worsening right first toe inflammation, erythema and pain   afebrile, no CBC.  Patient resting comfortably with no new complaints.  Family at bedside.  Plan of care discussed in detail with patient at bedside.  He states he will be transferred to a nursing skilled facility at discharge.   AF WBC12.5 states pain controlled-denies SE abx Appetite good  2/10 AF no new complaints-surgical dressing still in place. Denies SE abx  Labs Reviewed and Medications Reviewed.    Review of Systems:  Review of Systems   Constitutional: Negative for chills and fever.   Respiratory: Negative for cough and shortness of breath.    Gastrointestinal: Negative for abdominal pain, diarrhea, nausea and vomiting.   Musculoskeletal: Positive for myalgias.   Neurological: Positive for sensory change. Negative for dizziness and headaches.   All other systems reviewed and are negative.      Hemodynamics:  Temp (24hrs), Av.3 °C (97.3 °F), Min:36.1 °C (97 °F), Max:36.4 °C (97.6 °F)  Temperature: (q8 v/s per RN)  Pulse  Av.9  Min: 81  Max: 107   Blood Pressure: 127/81       Physical Exam:  Physical Exam  Vitals signs and nursing note reviewed.   Constitutional:       General: He is not in acute distress.     Appearance: He is not ill-appearing or toxic-appearing.   HENT:      Nose: No congestion or rhinorrhea.      Mouth/Throat:      Mouth: Mucous membranes are moist.      Pharynx: No oropharyngeal exudate.   Eyes:      General: No scleral icterus.     Extraocular Movements: Extraocular movements intact.      Pupils: Pupils are equal, round, and reactive to light.   Neck:      Musculoskeletal: Neck supple.    Cardiovascular:      Rate and Rhythm: Regular rhythm. Tachycardia present.      Pulses: Normal pulses.   Pulmonary:      Effort: Pulmonary effort is normal. No respiratory distress.      Breath sounds: No stridor. No wheezing.   Abdominal:      General: There is no distension.      Palpations: Abdomen is soft.      Tenderness: There is no tenderness. There is no guarding.   Musculoskeletal:      Right lower leg: No edema.      Left lower leg: No edema.      Comments: Right foot and surgical dressing   Lymphadenopathy:      Cervical: No cervical adenopathy.   Skin:     General: Skin is warm.      Coloration: Skin is not jaundiced.   Neurological:      General: No focal deficit present.      Mental Status: He is alert and oriented to person, place, and time.      Cranial Nerves: No cranial nerve deficit.   Psychiatric:         Mood and Affect: Mood normal.         Behavior: Behavior normal.      Comments: Pleasant         Meds:    Current Facility-Administered Medications:   •  insulin glargine **AND** [DISCONTINUED] insulin lispro **AND** Accu-Chek Q6 if NPO **AND** NOTIFY MD and PharmD **AND** [DISCONTINUED] glucose **AND** [DISCONTINUED] dextrose 10% bolus  •  insulin regular **AND** Accu-Chek ACHS **AND** NOTIFY MD and PharmD **AND** glucose **AND** dextrose 10% bolus  •  lisinopril  •  metFORMIN  •  morphine injection  •  ceFAZolin  •  senna-docusate **AND** polyethylene glycol/lytes **AND** magnesium hydroxide **AND** bisacodyl  •  acetaminophen  •  atorvastatin  •  therapeutic multivitamin-minerals  •  HYDROcodone/acetaminophen    Labs:  No results for input(s): WBC, RBC, HEMOGLOBIN, HEMATOCRIT, MCV, MCH, RDW, PLATELETCT, MPV, NEUTSPOLYS, LYMPHOCYTES, MONOCYTES, EOSINOPHILS, BASOPHILS, RBCMORPHOLO in the last 72 hours.  No results for input(s): SODIUM, POTASSIUM, CHLORIDE, CO2, GLUCOSE, BUN, CPKTOTAL in the last 72 hours.  No results for input(s): ALBUMIN, TBILIRUBIN, ALKPHOSPHAT, TOTPROTEIN, ALTSGPT,  ASTSGOT, CREATININE in the last 72 hours.    Imaging:  Dx-foot-complete 3+ Right    Result Date: 2/3/2020  2/3/2020 6:28 PM HISTORY/REASON FOR EXAM:  Pain/Deformity Following Trauma Redness, swelling and pain TECHNIQUE/EXAM DESCRIPTION AND NUMBER OF VIEWS: 3 views of the RIGHT foot. COMPARISON:  1/20/2020 FINDINGS: There is no fracture or dislocation. The visualized osseous structures are in anatomic alignment. There are mild degenerative changes of the great toe MTP joint. There is some soft tissue swelling of the great toe and in the dorsum of the foot. There is suggestion of small marginal erosion medial proximal phalangeal base of the great toe, not well seen on prior study. Cannot exclude osteomyelitis or septic arthritis. MRI without and with contrast is recommended for further evaluation. Achilles and plantar calcaneal spurs. Extensive atherosclerosis.     Mildly increased soft tissue swelling. Periarticular erosion most conspicuous about the great toe proximal phalanx which could represent osteomyelitis end your septic arthritis. MRI without and with contrast is recommended for further evaluation.    Dx-foot-complete 3+ Right    Result Date: 1/20/2020 1/20/2020 5:58 AM HISTORY/REASON FOR EXAM:  Pain/Deformity Following Trauma Great toe injury, pain TECHNIQUE/EXAM DESCRIPTION AND NUMBER OF VIEWS: 3 views of the RIGHT foot. COMPARISON:  None. FINDINGS: There is no fracture or dislocation. Small calcific density adjacent to the distal phalangeal neck of the great toe measuring 3 mm of uncertain etiology or significance. The visualized osseous structures are in anatomic alignment. Minimal degenerative changes great toe MTP joint within the interphalangeal joints. Plantar and Achilles calcaneal spurs. Bone mineralization is age-appropriate.. Prominent atherosclerotic arterial calcifications. Soft tissue swelling dorsum of the foot.     Soft tissue swelling without acute osseous abnormality. If pain persists,  repeat radiographs in 7-10 days is recommended.    Mr-foot-with & W/o Right    Result Date: 2/4/2020 2/4/2020 1:46 PM HISTORY/REASON FOR EXAM:  Osteomyelitis suspected, diabetic. Great toe swelling and pain for 2 weeks. TECHNIQUE/EXAM DESCRIPTION: MRI of the RIGHT foot with and without contrast. Using a [employer] Signa 1.5 Rosa MRI scanner, T1 axial, sagittal, and coronal, fast spin-echo T2 fat-suppressed axial and coronal, fast inversion recovery sagittal, and T1 fat suppressed axial and sagittal post intravenous contrast images were obtained. A total of 15 mL ProHance given. COMPARISON:  Radiographs February 3. FINDINGS: There is considerable first ray abnormality. There is fatty marrow replacement with increased T2, decreased T1 signal throughout the first metatarsal and proximal phalanx. The abnormal signal extends all the way to the first metatarsal base. There is no abnormal first TMT effusion. There is patchy bony enhancement in the distal first metatarsal neck, head and just anterior to the metatarsal base. There are some areas of absent enhancement at the plantar first metatarsal base as well as in the lateral metatarsal head which could indicate necrosis. There is edema and enhancement tracking along the metatarsal shaft but this has a rind of absent enhancement measuring up to 7 mm and this is compatible with necrotic tissue favored over abscess because this does not have as high a T2 signal as would be expected for abscess. Both are possible. There is absent enhancement which wraps around the lateral margin of the mid metatarsal shaft into the dorsal forefoot soft tissues anteriorly where there is a dorsal proximal phalanx level area of skin ulceration  and absent enhancement. This may be a draining sinus There is mild edema and enhancement centered at the medial intercuneiform articulation and this is most likely degenerative The remainder of the bony structures are normal. There is no subluxation or bony  fragmentation. There is diffuse forefoot and midfoot soft tissue edema especially along the dorsum subcutaneous fat. There is skin thickening and enhancement anteromedially in the forefoot. There is some intrinsic musculature enhancement centered at the first but also involving the second and third rays No abnormal joint effusion is seen No abnormal flexor or extensor tendon signal is noted     First metatarsal and proximal phalanx osteomyelitis with possible necrosis. First metatarsal-phalangeal effusion from septic arthropathy is not seen, indicating the joint fluid is likely draining through the dorsal soft tissues Dorsal lateral great toe skin ulceration with probable draining sinus and underlying necrotic tissue interposed between the first and second metatarsal shafts, wrapping under the plantar aspect of the proximal first metatarsal shaft Medial forefoot cellulitis, myositis Mild midfoot osteoarthritis    Ec-echocardiogram Complete W/o Cont    Result Date: 2/6/2020  Transthoracic Echo Report Echocardiography Laboratory CONCLUSIONS No prior study is available for comparison. Normal left ventricular systolic function. No evidence of valvular abnormality based on Doppler evaluation.Time             LV EF:  65    % FINDINGS Left Ventricle Normal left ventricular chamber size. Normal left ventricular wall thickness. Normal left ventricular systolic function. Left ventricular ejection fraction is visually estimated to be 65%. Normal regional wall motion. Normal diastolic function. Right Ventricle Normal right ventricular size and systolic function. Right Atrium Normal right atrial size. Normal inferior vena cava size and inspiratory collapse. Left Atrium Normal left atrial size. Left atrial volume index is 28 mL/sq m. Mitral Valve Structurally normal mitral valve. No mitral stenosis. Trace mitral regurgitation. Aortic Valve Structurally normal aortic valve. No aortic stenosis. No aortic insufficiency. Tricuspid  "Valve Structurally normal tricuspid valve. No tricuspid stenosis. Trace tricuspid regurgitation. Unable to estimate pulmonary artery pressure due to an inadequate tricuspid regurgitant jet. Pulmonic Valve Structurally normal pulmonic valve. No pulmonic stenosis. No pulmonic insufficiency. Pericardium No pericardial effusion seen. Aorta Normal aortic root for body surface area. Ascending aorta diameter is 3.2 cm. Rivera Nolasco (Electronically Signed) Final Date:     06 February 2020                 12:09    Us-renal Artery Duplex Comp    Result Date: 2/4/2020  Renal Artery Duplex  Ultrasound Report  Vascular Laboratory  CONCLUSIONS  No evidence of renal artery stenosis.                     Appearance                          Normal           Normal                    Perfusion Scan                      Normal  FINDINGS  Widening of the abdominal aorta to a maximum diameter of 2.6 cm.  Velocities and waveforms are normal through the bilateral renal arteries.  Waveforms of the bilateral renal veins are normal.  Resistive indices are normal at the bilateral renal zhuo.  Bilateral kidney size, perfusion and tissue densities appear normal.  Renal aortic ratios are within normal limits: Right: 0.6 to 1.4, Left: 0.4 to   0.75.  Isidro Rodriguez MD  (Electronically Signed)  Final Date:      04 February 2020 14:39      Micro:  Results     Procedure Component Value Units Date/Time    BLOOD CULTURE x2 [[id number]] Collected:  02/03/20 1632    Order Status:  Completed Specimen:  Blood from Peripheral Updated:  02/08/20 2100     Significant Indicator NEG     Source BLD     Site PERIPHERAL     Culture Result No growth after 5 days of incubation.    Narrative:       Per Hospital Policy: Only change Specimen Src: to \"Line\" if  specified by physician order.  Right AC    Anaerobic Culture [[id number]] Collected:  02/05/20 1618    Order Status:  Completed Specimen:  Wound Updated:  02/08/20 1352     Significant Indicator NEG     " Source WND     Site Right Bone Toe Biopsy     Culture Result No Anaerobes isolated.    CULTURE WOUND W/ GRAM STAIN [[id number]]  (Abnormal) Collected:  02/05/20 1618    Order Status:  Completed Specimen:  Wound Updated:  02/08/20 1352     Significant Indicator POS     Source WND     Site Right Bone Toe Biopsy     Culture Result -     Gram Stain Result Rare WBCs.  Few Gram positive cocci.       Culture Result Staphylococcus aureus  Heavy growth  See previous culture for sensitivity report.      CULTURE WOUND W/ GRAM STAIN [[id number]]  (Abnormal)  (Susceptibility) Collected:  02/05/20 1555    Order Status:  Completed Specimen:  Wound Updated:  02/08/20 1352     Significant Indicator POS     Source WND     Site Right Great Toe Deep     Culture Result -     Gram Stain Result Moderate WBCs.  Few Gram positive cocci.       Culture Result Staphylococcus aureus  Heavy growth      Narrative:       Surgery - swabs received    Susceptibility     Staphylococcus aureus (1)     Antibiotic Interpretation Microscan Method Status    Azithromycin Sensitive <=2 mcg/mL BELEN Final    Clindamycin Sensitive <=0.5 mcg/mL BELEN Final    Cefazolin Sensitive <=8 mcg/mL EBLEN Final    Ceftaroline Sensitive <=0.5 mcg/mL BELEN Final    Daptomycin Sensitive <=1 mcg/mL BELEN Final    Ampicillin/sulbactam Sensitive <=8/4 mcg/mL BELEN Final    Erythromycin Sensitive <=0.25 mcg/mL BELEN Final    Vancomycin Sensitive 1 mcg/mL BELEN Final    Oxacillin Sensitive <=0.25 mcg/mL BELEN Final    Pip/Tazobactam Sensitive <=4 mcg/mL BELEN Final    Trimeth/Sulfa Sensitive <=0.5/9.5 mcg/mL BELEN Final    Tetracycline Sensitive <=4 mcg/mL BELEN Final                   Anaerobic Culture [[id number]] Collected:  02/05/20 1555    Order Status:  Completed Specimen:  Wound Updated:  02/08/20 1352     Significant Indicator NEG     Source WND     Site Right Great Toe Deep     Culture Result No Anaerobes isolated.    Narrative:       Surgery - swabs received    Blood Culture [[id number]]  "Collected:  02/07/20 1206    Order Status:  Completed Specimen:  Blood from Peripheral Updated:  02/08/20 0701     Significant Indicator NEG     Source BLD     Site PERIPHERAL     Culture Result No Growth  Note: Blood cultures are incubated for 5 days and  are monitored continuously.Positive blood cultures  are called to the RN and reported as soon as  they are identified.      Narrative:       Per Hospital Policy: Only change Specimen Src: to \"Line\" if  specified by physician order.  Left Forearm/Arm    Blood Culture [[id number]] Collected:  02/07/20 1206    Order Status:  Completed Specimen:  Blood from Peripheral Updated:  02/08/20 0701     Significant Indicator NEG     Source BLD     Site PERIPHERAL     Culture Result No Growth  Note: Blood cultures are incubated for 5 days and  are monitored continuously.Positive blood cultures  are called to the RN and reported as soon as  they are identified.      Narrative:       Per Hospital Policy: Only change Specimen Src: to \"Line\" if  specified by physician order.  Left AC    BLOOD CULTURE [[id number]]  (Abnormal) Collected:  02/05/20 1655    Order Status:  Completed Specimen:  Blood from Peripheral Updated:  02/07/20 1036     Significant Indicator POS     Source BLD     Site PERIPHERAL     Culture Result Growth detected by Bactec instrument. 02/06/2020  18:16      Staphylococcus aureus  See previous culture for sensitivity report.      Narrative:       CALL  Mccormick  131 tel. [phone],  CALLED  131 tel. [phone] 02/06/2020, 18:20, RB PERF. RESULTS CALLED  TO:[phone]  Per Hospital Policy: Only change Specimen Src: to \"Line\" if  specified by physician order.  Left Forearm/Arm    BLOOD CULTURE x2 [[id number]]  (Abnormal)  (Susceptibility) Collected:  02/03/20 1632    Order Status:  Completed Specimen:  Blood from Peripheral Updated:  02/06/20 0835     Significant Indicator POS     Source BLD     Site PERIPHERAL     Culture Result Growth detected by Bactec instrument. " "02/04/2020  19:13  Staphylococcus aureus (methicillin sensitive)  detected by PCR.        Staphylococcus aureus    Narrative:       CALL  Mccormick  131 tel. [phone],  CALLED  131 tel. [phone] 02/04/2020, 19:16, RB PERF. RESULTS CALLED TO: RN  [phone]  Per Hospital Policy: Only change Specimen Src: to \"Line\" if  specified by physician order.  Right Hand    Susceptibility     Staphylococcus aureus (1)     Antibiotic Interpretation Microscan Method Status    Azithromycin Sensitive <=2 mcg/mL BELEN Final    Clindamycin Sensitive <=0.5 mcg/mL BELEN Final    Cefazolin Sensitive <=8 mcg/mL BELEN Final    Ceftaroline Sensitive <=0.5 mcg/mL BELEN Final    Daptomycin Sensitive <=1 mcg/mL BELEN Final    Ampicillin/sulbactam Sensitive <=8/4 mcg/mL BELEN Final    Erythromycin Sensitive <=0.25 mcg/mL BELEN Final    Vancomycin Sensitive 1 mcg/mL BELEN Final    Oxacillin Sensitive <=0.25 mcg/mL BELEN Final    Pip/Tazobactam Sensitive <=4 mcg/mL BELEN Final    Trimeth/Sulfa Sensitive <=0.5/9.5 mcg/mL BELEN Final    Tetracycline Sensitive <=4 mcg/mL BELEN Final                   BLOOD CULTURE [[id number]] Collected:  02/05/20 1655    Order Status:  Completed Specimen:  Blood from Peripheral Updated:  02/06/20 0712     Significant Indicator NEG     Source BLD     Site PERIPHERAL     Culture Result No Growth  Note: Blood cultures are incubated for 5 days and  are monitored continuously.Positive blood cultures  are called to the RN and reported as soon as  they are identified.      Narrative:       Per Hospital Policy: Only change Specimen Src: to \"Line\" if  specified by physician order.  Left Hand    GRAM STAIN [[id number]] Collected:  02/05/20 1555    Order Status:  Completed Specimen:  Wound Updated:  02/05/20 2041     Significant Indicator .     Source WND     Site Right Great Toe Deep     Gram Stain Result Moderate WBCs.  Few Gram positive cocci.      Narrative:       Surgery - swabs received    GRAM STAIN [[id number]] Collected:  02/05/20 1618    Order " Status:  Completed Specimen:  Wound Updated:  02/05/20 2040     Significant Indicator .     Source WND     Site Right Bone Toe Biopsy     Gram Stain Result Rare WBCs.  Few Gram positive cocci.      CULTURE WOUND W/ GRAM STAIN [[id number]]     Order Status:  No result Specimen:  Wound from Toe       FINAL DIAGNOSIS:    A. Right great toe:         Section specimen demonstrates benign skin and subcutaneous          tissue with area of ulceration, associated marked acute          inflammation, necrosis and fibrinopurulent debris.         Underlying bone demonstrates areas of acute inflammation          consistent with osteomyelitis.         Skin/soft tissue margin demonstrates areas of acute inflammation          and fibropurulent debris.         Bone resection margin demonstrates area of necrotic debris and          few scattered neutrophils focally extending to the inked          resection margin.  B. Right bone toe biopsy:         Small fragments of benign bone with intervening fibrofatty          marrow spaces demonstrating necrotic debris, few small          aggregates of neutrophils and reactive changes, overall          findings suspicious for osteomyelitis.       Assessment:  Active Hospital Problems    Diagnosis   • Cellulitis of toe, right [L03.031]   • Osteomyelitis of great toe of right foot ([facility]) [M86.9]   • DM (diabetes mellitus) ([facility]) [E11.9]   • Hyperlipidemia [E78.5]       Pertinent diagnoses:  Right great toe osteomyelitis  MSSA sepsis  Type 2 diabetes mellitus, HgA1c 6.9  Leukocytosis     Plan:  - Continue IV cefazolin 2 g every 8 hours  - Blood cultures on 2/3,  2/5 - MSSA  -Repeat blood cultures on 2/7 - NGTD  - TTE - negative. Recommend BRYN if feasible  -s/p I&D down to bone, right great toe amputation with wound VAC placement on 2/5 with Dr. Brower.  Gross purulence noted per the operative note. Path +OM  - s/p repeat I&D with wound closure on 2/7 by Dr. Randall  -Anticipate a 6 weeks of IV  antibiotics for osteomyelitis from date of 1st negative BCx  --PICC line ordered  -Keep BS under 150 to help control current infection  -check CBC    Anticipated stop date IV abx 3/20/2020    Prognosis for limb salvage guarded    I have performed a physical exam and reviewed and updated ROS and plan today 2/10/2020.  In review of yesterday's note 2/9/2020, there are no changes except as documented above.

## 2020-02-10 NOTE — DISCHARGE PLANNING
Pt requested to speak to CM/SW regarding short term disability paperwork that his PCP was suppose to sign.  Pt informed that he will have to contact his PCP for that information.

## 2020-02-10 NOTE — PROGRESS NOTES
[facility] [place] FAMILY MEDICINE PROGRESS NOTE     Attending:   Jaems    Resident:   Sanjay Swanson M.D.    PATIENT:   Rob Webster; [id number]; 1965    ID:   54 y.o. male admitted for osteomyelitis now s/p ray amputation R great toe.     SUBJECTIVE:   No acute events overnight, pt doing well and able to ambulate to the bathroom without assistance. Pain well controlled. No N/V/D and tolerating PO well. No complaints of peripheral numbness or tingling.     OBJECTIVE:  Vitals:    02/09/20 1600 02/09/20 2044 02/10/20 0455 02/10/20 0800   BP: 134/85 129/78 132/88 127/81   Pulse: 93 85 81 89   Resp: 16 18 18 17   Temp: 36.4 °C (97.6 °F) 36.2 °C (97.2 °F) 36.1 °C (97 °F) 36.2 °C (97.2 °F)   TempSrc: Temporal Temporal Temporal Temporal   SpO2: 94% 98% 98% 96%   Weight:       Height:           Intake/Output Summary (Last 24 hours) at 2/10/2020 1257  Last data filed at 2/10/2020 0900  Gross per 24 hour   Intake 1480 ml   Output 2050 ml   Net -570 ml       PHYSICAL EXAM:  General: No acute distress, afebrile, resting comfortably  HEENT: NC/AT. EOMI. MMM, neck supple without adenopathy  Cardiovascular: RRR, normal S1/S2 no murmurs rubs, or gallops, cap refill brisk.  Respiratory: CTAB, no tachypnea or retractions  Abdomen: soft, NT/ND, no masses  EXT:  No rashes or skin changes noted. RLE wrapped in dressing which is clean, dry, and intact.  Neuro: Non-focal    LABS:  Estimated GFR/CRCL = Estimated Creatinine Clearance: 109.7 mL/min (by C-G formula based on SCr of 0.72 mg/dL).  Recent Labs     02/09/20 2112 02/10/20  0744 02/10/20  1110   POCGLUCOSE 187* 181* 266*       IMAGING:  None new    MEDS:  Current Facility-Administered Medications   Medication Last Dose   • insulin glargine (LANTUS) injection 15 Units     • insulin regular (HUMULIN R) injection 2-9 Units 2 Units at 02/10/20 0758    And   • glucose 4 g chewable tablet 16 g      And   • DEXTROSE 10% BOLUS 250 mL     • lisinopril (PRINIVIL) 10 MG tablet 10 mg 10 mg at  02/10/20 0454   • metFORMIN (GLUCOPHAGE) tablet 500 mg 500 mg at 02/10/20 0800   • morphine (pf) 4 MG/ML injection 2 mg     • ceFAZolin in dextrose (ANCEF) IVPB premix 2 g Stopped at 02/10/20 0522   • senna-docusate (PERICOLACE or SENOKOT S) 8.6-50 MG per tablet 2 Tab 2 Tab at 02/10/20 0454    And   • polyethylene glycol/lytes (MIRALAX) PACKET 1 Packet 1 Packet at 02/04/20 2038    And   • magnesium hydroxide (MILK OF MAGNESIA) suspension 30 mL      And   • bisacodyl (DULCOLAX) suppository 10 mg     • acetaminophen (TYLENOL) tablet 650 mg 650 mg at 02/09/20 1441   • atorvastatin (LIPITOR) tablet 40 mg 40 mg at 02/10/20 0454   • therapeutic multivitamin-minerals (THERAGRAN-M) tablet 1 Tab 1 Tab at 02/10/20 0454   • HYDROcodone/acetaminophen (NORCO)  MG per tablet 1 Tab 1 Tab at 02/09/20 2115       ASSESSMENT/PLAN:   55 yo male with DM type II and s/p ray amputation R great toe following osteomyelitis    # osteomyelitis  -continue IV ancef for 6 weeks total per ID recs from 2/7 as negative culture date  -dressing changes daily  -monitor for fevers, repeat CBC and cultures if spikes fever or other signs of infection return     # Ray amputation R great toe  -dressing changes daily  -heal touch weight bearing per ortho  -PT at post acute facility per latest recs  -PRN pain medications, well controlled at this point    # MSSA bacteremia  -staph pan sensitive  -culture drawn 2/7 now negative x72 hours  -PICC line orders placed today  -pt can discharge to SNF with IV abx infusion once PICC line placed    #DM type II  -last A1C 6.9 in January of 2020  -suspect elevated sugars in hospital due to stress and infection reaction however many in 200s in last 24 hours  -metformin 500 mg BID can increase to 1000 mg BID on 2/14  -increase insulin lantus qHS to 15U and if not improved tomorrow increase ISS to 5U basal with mealtime with sliding scale on top  -ideally will not need insulin long term with well controlled status  prior to admission however post-op important to have tight control to promote wound healing  -continue lisinopril for renal protection    #HLD  -continue statin    Core measures  Lines: PIV  IVF: -  Abx: Ancef  GI PPx: multimodal; last BM 02/08  DVT PPx: SCDs  Code: full  Diet: diabetic diet    Dispo: to SNF once PICC line placed

## 2020-02-10 NOTE — PROGRESS NOTES
POD3 s/p right foot repeat irrigation and debridement/wound closure  POD 5 s/p right first ray amputation for osteomyelitis/grossly purulent soft tissue infection    Currently doing well, pain controlled, patient states he has not required pain medication x 18 hours. No fevers, chills. Tolerating antibiotics well. No PICC as of yet, that is being placed either this afternoon or tomorrow.      Exam:  Dressings in place, clean and dry  Moves lesser toes  Sensation intact to lesser toes    54M with right foot osteomyelitis s/p staged amputation/irrigation and debridement  PICC line for IV antibiotics per infectious diseases  Heel weight bearing while in boot  Follow up LPS rounds 1-2 weeks post discharge      Juan Randall MD

## 2020-02-11 LAB
ANION GAP SERPL CALC-SCNC: 9 MMOL/L (ref 0–11.9)
BASOPHILS # BLD AUTO: 0.9 % (ref 0–1.8)
BASOPHILS # BLD: 0.09 K/UL (ref 0–0.12)
BUN SERPL-MCNC: 10 MG/DL (ref 8–22)
CALCIUM SERPL-MCNC: 8.9 MG/DL (ref 8.5–10.5)
CHLORIDE SERPL-SCNC: 98 MMOL/L (ref 96–112)
CO2 SERPL-SCNC: 26 MMOL/L (ref 20–33)
CREAT SERPL-MCNC: 0.64 MG/DL (ref 0.5–1.4)
EOSINOPHIL # BLD AUTO: 0.19 K/UL (ref 0–0.51)
EOSINOPHIL NFR BLD: 1.9 % (ref 0–6.9)
ERYTHROCYTE [DISTWIDTH] IN BLOOD BY AUTOMATED COUNT: 38.5 FL (ref 35.9–50)
GLUCOSE BLD-MCNC: 145 MG/DL (ref 65–99)
GLUCOSE BLD-MCNC: 147 MG/DL (ref 65–99)
GLUCOSE BLD-MCNC: 183 MG/DL (ref 65–99)
GLUCOSE SERPL-MCNC: 116 MG/DL (ref 65–99)
HCT VFR BLD AUTO: 34.5 % (ref 42–52)
HGB BLD-MCNC: 11.5 G/DL (ref 14–18)
IMM GRANULOCYTES # BLD AUTO: 0.25 K/UL (ref 0–0.11)
IMM GRANULOCYTES NFR BLD AUTO: 2.5 % (ref 0–0.9)
LYMPHOCYTES # BLD AUTO: 1.72 K/UL (ref 1–4.8)
LYMPHOCYTES NFR BLD: 17.1 % (ref 22–41)
MCH RBC QN AUTO: 27.7 PG (ref 27–33)
MCHC RBC AUTO-ENTMCNC: 33.3 G/DL (ref 33.7–35.3)
MCV RBC AUTO: 83.1 FL (ref 81.4–97.8)
MONOCYTES # BLD AUTO: 0.7 K/UL (ref 0–0.85)
MONOCYTES NFR BLD AUTO: 6.9 % (ref 0–13.4)
NEUTROPHILS # BLD AUTO: 7.13 K/UL (ref 1.82–7.42)
NEUTROPHILS NFR BLD: 70.7 % (ref 44–72)
NRBC # BLD AUTO: 0 K/UL
NRBC BLD-RTO: 0 /100 WBC
PLATELET # BLD AUTO: 429 K/UL (ref 164–446)
PMV BLD AUTO: 8.9 FL (ref 9–12.9)
POTASSIUM SERPL-SCNC: 3.8 MMOL/L (ref 3.6–5.5)
RBC # BLD AUTO: 4.15 M/UL (ref 4.7–6.1)
SODIUM SERPL-SCNC: 133 MMOL/L (ref 135–145)
WBC # BLD AUTO: 10.1 K/UL (ref 4.8–10.8)

## 2020-02-11 PROCEDURE — A9270 NON-COVERED ITEM OR SERVICE: HCPCS | Performed by: STUDENT IN AN ORGANIZED HEALTH CARE EDUCATION/TRAINING PROGRAM

## 2020-02-11 PROCEDURE — 700102 HCHG RX REV CODE 250 W/ 637 OVERRIDE(OP): Performed by: STUDENT IN AN ORGANIZED HEALTH CARE EDUCATION/TRAINING PROGRAM

## 2020-02-11 PROCEDURE — 82962 GLUCOSE BLOOD TEST: CPT | Mod: 91

## 2020-02-11 PROCEDURE — 97530 THERAPEUTIC ACTIVITIES: CPT

## 2020-02-11 PROCEDURE — 80048 BASIC METABOLIC PNL TOTAL CA: CPT

## 2020-02-11 PROCEDURE — 85025 COMPLETE CBC W/AUTO DIFF WBC: CPT

## 2020-02-11 PROCEDURE — 700111 HCHG RX REV CODE 636 W/ 250 OVERRIDE (IP): Performed by: INTERNAL MEDICINE

## 2020-02-11 PROCEDURE — 97116 GAIT TRAINING THERAPY: CPT

## 2020-02-11 PROCEDURE — 99232 SBSQ HOSP IP/OBS MODERATE 35: CPT | Performed by: INTERNAL MEDICINE

## 2020-02-11 PROCEDURE — 770006 HCHG ROOM/CARE - MED/SURG/GYN SEMI*

## 2020-02-11 RX ORDER — SODIUM CHLORIDE 9 MG/ML
INJECTION, SOLUTION INTRAVENOUS
Status: ACTIVE
Start: 2020-02-11 | End: 2020-02-12

## 2020-02-11 RX ADMIN — SENNOSIDES AND DOCUSATE SODIUM 2 TABLET: 8.6; 5 TABLET ORAL at 04:25

## 2020-02-11 RX ADMIN — HYDROCODONE BITARTRATE AND ACETAMINOPHEN 1 TABLET: 10; 325 TABLET ORAL at 22:10

## 2020-02-11 RX ADMIN — CEFAZOLIN SODIUM 2 G: 2 INJECTION, SOLUTION INTRAVENOUS at 22:10

## 2020-02-11 RX ADMIN — METFORMIN HYDROCHLORIDE 500 MG: 500 TABLET ORAL at 08:42

## 2020-02-11 RX ADMIN — METFORMIN HYDROCHLORIDE 500 MG: 500 TABLET ORAL at 17:47

## 2020-02-11 RX ADMIN — MULTIPLE VITAMINS W/ MINERALS TAB 1 TABLET: TAB at 04:25

## 2020-02-11 RX ADMIN — INSULIN HUMAN 6 UNITS: 100 INJECTION, SOLUTION PARENTERAL at 13:12

## 2020-02-11 RX ADMIN — ATORVASTATIN CALCIUM 40 MG: 40 TABLET, FILM COATED ORAL at 04:25

## 2020-02-11 RX ADMIN — CEFAZOLIN SODIUM 2 G: 2 INJECTION, SOLUTION INTRAVENOUS at 14:00

## 2020-02-11 RX ADMIN — INSULIN GLARGINE 15 UNITS: 100 INJECTION, SOLUTION SUBCUTANEOUS at 17:45

## 2020-02-11 RX ADMIN — CEFAZOLIN SODIUM 2 G: 2 INJECTION, SOLUTION INTRAVENOUS at 04:23

## 2020-02-11 ASSESSMENT — ENCOUNTER SYMPTOMS
SHORTNESS OF BREATH: 0
DIZZINESS: 0
VOMITING: 0
COUGH: 0
DIARRHEA: 0
HEADACHES: 0
ABDOMINAL PAIN: 0
CHILLS: 0
FEVER: 0
NAUSEA: 0
SENSORY CHANGE: 1

## 2020-02-11 ASSESSMENT — GAIT ASSESSMENTS
DISTANCE (FEET): 100
GAIT LEVEL OF ASSIST: SUPERVISED
ASSISTIVE DEVICE: FRONT WHEEL WALKER
DEVIATION: STEP TO;DECREASED HEEL STRIKE;DECREASED TOE OFF

## 2020-02-11 ASSESSMENT — COGNITIVE AND FUNCTIONAL STATUS - GENERAL
MOBILITY SCORE: 24
SUGGESTED CMS G CODE MODIFIER MOBILITY: CH

## 2020-02-11 NOTE — PROCEDURES
Vascular Access Team     Date of Insertion: 2/10/20  Arm Circumference: 30  Internal length: 43  External Length: 0  Vein Occupancy %: 35   Reason for PICC: Antibiotic therapy  Labs: WBC on 2/5 12.5, , BUN on 2/6 9, Cr 0.72, GFR >60, INR NA     Consents confirmed, vessel patency confirmed with ultrasound. Risks and benefits of procedure explained to patient and education regarding central line associated bloodstream infections provided. Questions answered.      PICC placed in RUE per licensed provider order with ultrasound guidance.  4 Fr, Single lumen PICC placed in Bacilic vein after 1 attempt(s). 2 mL of 1% lidocaine injected intradermally, 21 gauge microintroducer needle and modified Seldinger technique used. 43 cm catheter inserted with good blood return. Secured at 0 cm marker. Each lumen flushed without resistance with 10 mL 0.9% normal saline. PICC line secured with Biopatch and Tegaderm.     PICC tip placement location is confirmed by nurse to be in the Superior Vena Cava (SVC) utilizing 3CG technology. PICC line is appropriate for use at this time. Patient tolerated procedure well, without complications.  Patient condition relayed to unit RN or ordering physician via this post procedure note in the EMR.      Ultrasound images uploaded to PACS and viewable in the EMR - yes  Ultrasound imaged printed and placed in paper chart - no     BARD Power PICC ref # [id number], Lot # [id number], Expiration Date 12/31/20

## 2020-02-11 NOTE — PROGRESS NOTES
Infectious Disease Progress Note    Author: Kaylan Cooper M.D. Date & Time of service: 2020  2:03 PM    Chief Complaint:  Follow-up for MSSA sepsis/right foot osteomyelitis    Interval History:  54-year-old diabetic male with recent first right toe traumatic injury on 2020 admitted 2/3/2020 with a 2-week history of worsening right first toe inflammation, erythema and pain   afebrile, no CBC.  Patient resting comfortably with no new complaints.  Family at bedside.  Plan of care discussed in detail with patient at bedside.  He states he will be transferred to a nursing skilled facility at discharge.   AF WBC12.5 states pain controlled-denies SE abx Appetite good  2/10 AF no new complaints-surgical dressing still in place. Denies SE abx   AF WBC 10.1 got PICC tolerating abx-states probable dc to SNF today. Wound reviewed  Labs Reviewed and Medications Reviewed.     Review of Systems:  Review of Systems   Constitutional: Negative for chills and fever.   Respiratory: Negative for cough and shortness of breath.    Gastrointestinal: Negative for abdominal pain, diarrhea, nausea and vomiting.   Skin: Negative for rash.   Neurological: Positive for sensory change. Negative for dizziness and headaches.   All other systems reviewed and are negative.      Hemodynamics:  Temp (24hrs), Av.8 °C (98.3 °F), Min:36.7 °C (98 °F), Max:37 °C (98.6 °F)  Temperature: 36.8 °C (98.3 °F)  Pulse  Av.1  Min: 66  Max: 107   Blood Pressure: 120/78       Physical Exam:  Physical Exam  Vitals signs and nursing note reviewed.   Constitutional:       General: He is not in acute distress.     Appearance: He is not ill-appearing or toxic-appearing.   HENT:      Nose: No congestion or rhinorrhea.      Mouth/Throat:      Mouth: Mucous membranes are moist.      Pharynx: No oropharyngeal exudate.   Eyes:      General: No scleral icterus.     Extraocular Movements: Extraocular movements intact.      Conjunctiva/sclera:  Conjunctivae normal.      Pupils: Pupils are equal, round, and reactive to light.   Neck:      Musculoskeletal: Neck supple.   Cardiovascular:      Rate and Rhythm: Regular rhythm. Tachycardia present.      Pulses: Normal pulses.   Pulmonary:      Effort: Pulmonary effort is normal. No respiratory distress.      Breath sounds: No stridor. No wheezing, rhonchi or rales.   Abdominal:      General: There is no distension.      Palpations: Abdomen is soft.      Tenderness: There is no tenderness. There is no guarding.   Musculoskeletal:      Right lower leg: No edema.      Left lower leg: No edema.      Comments: Right foot C/D/I  RUE PICC   Lymphadenopathy:      Cervical: No cervical adenopathy.   Skin:     General: Skin is warm.      Coloration: Skin is not jaundiced.      Findings: No bruising.   Neurological:      General: No focal deficit present.      Mental Status: He is alert and oriented to person, place, and time.      Cranial Nerves: No cranial nerve deficit.   Psychiatric:         Mood and Affect: Mood normal.         Behavior: Behavior normal.      Comments: Pleasant         Meds:    Current Facility-Administered Medications:   •  NS  •  insulin regular **AND** Accu-Chek ACHS **AND** NOTIFY MD and PharmD **AND** glucose **AND** dextrose 10% bolus  •  insulin regular  •  insulin glargine **AND** [DISCONTINUED] insulin lispro **AND** Accu-Chek Q6 if NPO **AND** NOTIFY MD and PharmD **AND** [DISCONTINUED] glucose **AND** [DISCONTINUED] dextrose 10% bolus  •  lisinopril  •  metFORMIN  •  morphine injection  •  ceFAZolin  •  senna-docusate **AND** polyethylene glycol/lytes **AND** magnesium hydroxide **AND** bisacodyl  •  acetaminophen  •  atorvastatin  •  therapeutic multivitamin-minerals  •  HYDROcodone/acetaminophen    Labs:  Recent Labs     02/11/20  0000   WBC 10.1   RBC 4.15*   HEMOGLOBIN 11.5*   HEMATOCRIT 34.5*   MCV 83.1   MCH 27.7   RDW 38.5   PLATELETCT 429   MPV 8.9*   NEUTSPOLYS 70.70   LYMPHOCYTES  17.10*   MONOCYTES 6.90   EOSINOPHILS 1.90   BASOPHILS 0.90     Recent Labs     02/11/20  0000   SODIUM 133*   POTASSIUM 3.8   CHLORIDE 98   CO2 26   GLUCOSE 116*   BUN 10     Recent Labs     02/11/20  0000   CREATININE 0.64       Imaging:  Dx-foot-complete 3+ Right    Result Date: 2/3/2020  2/3/2020 6:28 PM HISTORY/REASON FOR EXAM:  Pain/Deformity Following Trauma Redness, swelling and pain TECHNIQUE/EXAM DESCRIPTION AND NUMBER OF VIEWS: 3 views of the RIGHT foot. COMPARISON:  1/20/2020 FINDINGS: There is no fracture or dislocation. The visualized osseous structures are in anatomic alignment. There are mild degenerative changes of the great toe MTP joint. There is some soft tissue swelling of the great toe and in the dorsum of the foot. There is suggestion of small marginal erosion medial proximal phalangeal base of the great toe, not well seen on prior study. Cannot exclude osteomyelitis or septic arthritis. MRI without and with contrast is recommended for further evaluation. Achilles and plantar calcaneal spurs. Extensive atherosclerosis.     Mildly increased soft tissue swelling. Periarticular erosion most conspicuous about the great toe proximal phalanx which could represent osteomyelitis end your septic arthritis. MRI without and with contrast is recommended for further evaluation.    Dx-foot-complete 3+ Right    Result Date: 1/20/2020 1/20/2020 5:58 AM HISTORY/REASON FOR EXAM:  Pain/Deformity Following Trauma Great toe injury, pain TECHNIQUE/EXAM DESCRIPTION AND NUMBER OF VIEWS: 3 views of the RIGHT foot. COMPARISON:  None. FINDINGS: There is no fracture or dislocation. Small calcific density adjacent to the distal phalangeal neck of the great toe measuring 3 mm of uncertain etiology or significance. The visualized osseous structures are in anatomic alignment. Minimal degenerative changes great toe MTP joint within the interphalangeal joints. Plantar and Achilles calcaneal spurs. Bone mineralization is  age-appropriate.. Prominent atherosclerotic arterial calcifications. Soft tissue swelling dorsum of the foot.     Soft tissue swelling without acute osseous abnormality. If pain persists, repeat radiographs in 7-10 days is recommended.    Mr-foot-with & W/o Right    Result Date: 2/4/2020 2/4/2020 1:46 PM HISTORY/REASON FOR EXAM:  Osteomyelitis suspected, diabetic. Great toe swelling and pain for 2 weeks. TECHNIQUE/EXAM DESCRIPTION: MRI of the RIGHT foot with and without contrast. Using a [employer] 1.5 Rosa MRI scanner, T1 axial, sagittal, and coronal, fast spin-echo T2 fat-suppressed axial and coronal, fast inversion recovery sagittal, and T1 fat suppressed axial and sagittal post intravenous contrast images were obtained. A total of 15 mL ProHance given. COMPARISON:  Radiographs February 3. FINDINGS: There is considerable first ray abnormality. There is fatty marrow replacement with increased T2, decreased T1 signal throughout the first metatarsal and proximal phalanx. The abnormal signal extends all the way to the first metatarsal base. There is no abnormal first TMT effusion. There is patchy bony enhancement in the distal first metatarsal neck, head and just anterior to the metatarsal base. There are some areas of absent enhancement at the plantar first metatarsal base as well as in the lateral metatarsal head which could indicate necrosis. There is edema and enhancement tracking along the metatarsal shaft but this has a rind of absent enhancement measuring up to 7 mm and this is compatible with necrotic tissue favored over abscess because this does not have as high a T2 signal as would be expected for abscess. Both are possible. There is absent enhancement which wraps around the lateral margin of the mid metatarsal shaft into the dorsal forefoot soft tissues anteriorly where there is a dorsal proximal phalanx level area of skin ulceration  and absent enhancement. This may be a draining sinus There is mild  edema and enhancement centered at the medial intercuneiform articulation and this is most likely degenerative The remainder of the bony structures are normal. There is no subluxation or bony fragmentation. There is diffuse forefoot and midfoot soft tissue edema especially along the dorsum subcutaneous fat. There is skin thickening and enhancement anteromedially in the forefoot. There is some intrinsic musculature enhancement centered at the first but also involving the second and third rays No abnormal joint effusion is seen No abnormal flexor or extensor tendon signal is noted     First metatarsal and proximal phalanx osteomyelitis with possible necrosis. First metatarsal-phalangeal effusion from septic arthropathy is not seen, indicating the joint fluid is likely draining through the dorsal soft tissues Dorsal lateral great toe skin ulceration with probable draining sinus and underlying necrotic tissue interposed between the first and second metatarsal shafts, wrapping under the plantar aspect of the proximal first metatarsal shaft Medial forefoot cellulitis, myositis Mild midfoot osteoarthritis    Ec-echocardiogram Complete W/o Cont    Result Date: 2/6/2020  Transthoracic Echo Report Echocardiography Laboratory CONCLUSIONS No prior study is available for comparison. Normal left ventricular systolic function. No evidence of valvular abnormality based on Doppler evaluation.Time             LV EF:  65    % FINDINGS Left Ventricle Normal left ventricular chamber size. Normal left ventricular wall thickness. Normal left ventricular systolic function. Left ventricular ejection fraction is visually estimated to be 65%. Normal regional wall motion. Normal diastolic function. Right Ventricle Normal right ventricular size and systolic function. Right Atrium Normal right atrial size. Normal inferior vena cava size and inspiratory collapse. Left Atrium Normal left atrial size. Left atrial volume index is 28 mL/sq m. Mitral  "Valve Structurally normal mitral valve. No mitral stenosis. Trace mitral regurgitation. Aortic Valve Structurally normal aortic valve. No aortic stenosis. No aortic insufficiency. Tricuspid Valve Structurally normal tricuspid valve. No tricuspid stenosis. Trace tricuspid regurgitation. Unable to estimate pulmonary artery pressure due to an inadequate tricuspid regurgitant jet. Pulmonic Valve Structurally normal pulmonic valve. No pulmonic stenosis. No pulmonic insufficiency. Pericardium No pericardial effusion seen. Aorta Normal aortic root for body surface area. Ascending aorta diameter is 3.2 cm. Rivera Nolasco (Electronically Signed) Final Date:     06 February 2020                 12:09    Us-renal Artery Duplex Comp    Result Date: 2/4/2020  Renal Artery Duplex  Ultrasound Report  Vascular Laboratory  CONCLUSIONS  No evidence of renal artery stenosis.                     Appearance                          Normal           Normal                    Perfusion Scan                      Normal  FINDINGS  Widening of the abdominal aorta to a maximum diameter of 2.6 cm.  Velocities and waveforms are normal through the bilateral renal arteries.  Waveforms of the bilateral renal veins are normal.  Resistive indices are normal at the bilateral renal zhou.  Bilateral kidney size, perfusion and tissue densities appear normal.  Renal aortic ratios are within normal limits: Right: 0.6 to 1.4, Left: 0.4 to   0.75.  Isidro Rodriguez MD  (Electronically Signed)  Final Date:      04 February 2020 14:39      Micro:  Results     Procedure Component Value Units Date/Time    BLOOD CULTURE [[id number]] Collected:  02/05/20 1655    Order Status:  Completed Specimen:  Blood from Peripheral Updated:  02/10/20 1900     Significant Indicator NEG     Source BLD     Site PERIPHERAL     Culture Result No growth after 5 days of incubation.    Narrative:       Per Hospital Policy: Only change Specimen Src: to \"Line\" if  specified by " "physician order.  Left Hand    BLOOD CULTURE x2 [[id number]] Collected:  02/03/20 1632    Order Status:  Completed Specimen:  Blood from Peripheral Updated:  02/08/20 2100     Significant Indicator NEG     Source BLD     Site PERIPHERAL     Culture Result No growth after 5 days of incubation.    Narrative:       Per Hospital Policy: Only change Specimen Src: to \"Line\" if  specified by physician order.  Right AC    Anaerobic Culture [[id number]] Collected:  02/05/20 1618    Order Status:  Completed Specimen:  Wound Updated:  02/08/20 1352     Significant Indicator NEG     Source WND     Site Right Bone Toe Biopsy     Culture Result No Anaerobes isolated.    CULTURE WOUND W/ GRAM STAIN [[id number]]  (Abnormal) Collected:  02/05/20 1618    Order Status:  Completed Specimen:  Wound Updated:  02/08/20 1352     Significant Indicator POS     Source WND     Site Right Bone Toe Biopsy     Culture Result -     Gram Stain Result Rare WBCs.  Few Gram positive cocci.       Culture Result Staphylococcus aureus  Heavy growth  See previous culture for sensitivity report.      CULTURE WOUND W/ GRAM STAIN [[id number]]  (Abnormal)  (Susceptibility) Collected:  02/05/20 1555    Order Status:  Completed Specimen:  Wound Updated:  02/08/20 1352     Significant Indicator POS     Source WND     Site Right Great Toe Deep     Culture Result -     Gram Stain Result Moderate WBCs.  Few Gram positive cocci.       Culture Result Staphylococcus aureus  Heavy growth      Narrative:       Surgery - swabs received    Susceptibility     Staphylococcus aureus (1)     Antibiotic Interpretation Microscan Method Status    Azithromycin Sensitive <=2 mcg/mL BELEN Final    Clindamycin Sensitive <=0.5 mcg/mL BELEN Final    Cefazolin Sensitive <=8 mcg/mL BELEN Final    Ceftaroline Sensitive <=0.5 mcg/mL BELEN Final    Daptomycin Sensitive <=1 mcg/mL BELEN Final    Ampicillin/sulbactam Sensitive <=8/4 mcg/mL BELEN Final    Erythromycin Sensitive <=0.25 mcg/mL BELEN Final    " "Vancomycin Sensitive 1 mcg/mL BELEN Final    Oxacillin Sensitive <=0.25 mcg/mL BELEN Final    Pip/Tazobactam Sensitive <=4 mcg/mL BELEN Final    Trimeth/Sulfa Sensitive <=0.5/9.5 mcg/mL BELEN Final    Tetracycline Sensitive <=4 mcg/mL BELEN Final                   Anaerobic Culture [[id number]] Collected:  02/05/20 1555    Order Status:  Completed Specimen:  Wound Updated:  02/08/20 1352     Significant Indicator NEG     Source WND     Site Right Great Toe Deep     Culture Result No Anaerobes isolated.    Narrative:       Surgery - swabs received    Blood Culture [[id number]] Collected:  02/07/20 1206    Order Status:  Completed Specimen:  Blood from Peripheral Updated:  02/08/20 0701     Significant Indicator NEG     Source BLD     Site PERIPHERAL     Culture Result No Growth  Note: Blood cultures are incubated for 5 days and  are monitored continuously.Positive blood cultures  are called to the RN and reported as soon as  they are identified.      Narrative:       Per Hospital Policy: Only change Specimen Src: to \"Line\" if  specified by physician order.  Left Forearm/Arm    Blood Culture [[id number]] Collected:  02/07/20 1206    Order Status:  Completed Specimen:  Blood from Peripheral Updated:  02/08/20 0701     Significant Indicator NEG     Source BLD     Site PERIPHERAL     Culture Result No Growth  Note: Blood cultures are incubated for 5 days and  are monitored continuously.Positive blood cultures  are called to the RN and reported as soon as  they are identified.      Narrative:       Per Hospital Policy: Only change Specimen Src: to \"Line\" if  specified by physician order.  Left AC    BLOOD CULTURE [[id number]]  (Abnormal) Collected:  02/05/20 1655    Order Status:  Completed Specimen:  Blood from Peripheral Updated:  02/07/20 1036     Significant Indicator POS     Source BLD     Site PERIPHERAL     Culture Result Growth detected by Bactec instrument. 02/06/2020  18:16      Staphylococcus aureus  See previous culture for " "sensitivity report.      Narrative:       CALL  Mccormick  131 tel. [phone],  CALLED  131 tel. [phone] 02/06/2020, 18:20, RB PERF. RESULTS CALLED  TO:[id number]  Per Hospital Policy: Only change Specimen Src: to \"Line\" if  specified by physician order.  Left Forearm/Arm    BLOOD CULTURE x2 [[id number]]  (Abnormal)  (Susceptibility) Collected:  02/03/20 1632    Order Status:  Completed Specimen:  Blood from Peripheral Updated:  02/06/20 0835     Significant Indicator POS     Source BLD     Site PERIPHERAL     Culture Result Growth detected by Bactec instrument. 02/04/2020  19:13  Staphylococcus aureus (methicillin sensitive)  detected by PCR.        Staphylococcus aureus    Narrative:       CALL  Mccormick  131 tel. [phone],  CALLED  131 tel. [phone] 02/04/2020, 19:16, RB PERF. RESULTS CALLED TO: RN  [phone]  Per Hospital Policy: Only change Specimen Src: to \"Line\" if  specified by physician order.  Right Hand    Susceptibility     Staphylococcus aureus (1)     Antibiotic Interpretation Microscan Method Status    Azithromycin Sensitive <=2 mcg/mL BELEN Final    Clindamycin Sensitive <=0.5 mcg/mL BELEN Final    Cefazolin Sensitive <=8 mcg/mL BELEN Final    Ceftaroline Sensitive <=0.5 mcg/mL BELEN Final    Daptomycin Sensitive <=1 mcg/mL BELEN Final    Ampicillin/sulbactam Sensitive <=8/4 mcg/mL BELEN Final    Erythromycin Sensitive <=0.25 mcg/mL BELEN Final    Vancomycin Sensitive 1 mcg/mL BELEN Final    Oxacillin Sensitive <=0.25 mcg/mL BELEN Final    Pip/Tazobactam Sensitive <=4 mcg/mL BELEN Final    Trimeth/Sulfa Sensitive <=0.5/9.5 mcg/mL BELEN Final    Tetracycline Sensitive <=4 mcg/mL BELEN Final                   GRAM STAIN [[id number]] Collected:  02/05/20 1555    Order Status:  Completed Specimen:  Wound Updated:  02/05/20 2041     Significant Indicator .     Source WND     Site Right Great Toe Deep     Gram Stain Result Moderate WBCs.  Few Gram positive cocci.      Narrative:       Surgery - swabs received    GRAM STAIN [[id number]] " Collected:  02/05/20 1618    Order Status:  Completed Specimen:  Wound Updated:  02/05/20 2040     Significant Indicator .     Source WND     Site Right Bone Toe Biopsy     Gram Stain Result Rare WBCs.  Few Gram positive cocci.        FINAL DIAGNOSIS:    A. Right great toe:         Section specimen demonstrates benign skin and subcutaneous          tissue with area of ulceration, associated marked acute          inflammation, necrosis and fibrinopurulent debris.         Underlying bone demonstrates areas of acute inflammation          consistent with osteomyelitis.         Skin/soft tissue margin demonstrates areas of acute inflammation          and fibropurulent debris.         Bone resection margin demonstrates area of necrotic debris and          few scattered neutrophils focally extending to the inked          resection margin.  B. Right bone toe biopsy:         Small fragments of benign bone with intervening fibrofatty          marrow spaces demonstrating necrotic debris, few small          aggregates of neutrophils and reactive changes, overall          findings suspicious for osteomyelitis.       Assessment:  Active Hospital Problems    Diagnosis   • Cellulitis of toe, right [L03.031]   • Osteomyelitis of great toe of right foot ([facility]) [M86.9]   • DM (diabetes mellitus) ([facility]) [E11.9]   • Hyperlipidemia [E78.5]       Pertinent diagnoses:  Right great toe osteomyelitis  MSSA sepsis, on treatment  Type 2 diabetes mellitus, HgA1c 6.9  Leukocytosis-resolved     Plan:  - Continue IV cefazolin 2 g every 8 hours  - Blood cultures on 2/3,  2/5 - MSSA  -Repeat blood cultures on 2/7 - NGTD  - TTE - negative. Recommend BRYN if feasible  -s/p I&D down to bone, right great toe amputation with wound VAC placement on 2/5 with Dr. Brower.  Gross purulence noted per the operative note. Path +OM  - s/p repeat I&D with wound closure on 2/7 by Dr. Randall  -Anticipate a 6 weeks of IV antibiotics for MSSA  sepsis/osteomyelitis from date of 1st negative BCx  --Signs of infection discussed-check wound with every dressing change-daily if feasible  -Keep BS under 150 to help control current infection    Anticipated stop date IV abx 3/20/2020    Prognosis for limb salvage guarded    I have performed a physical exam and reviewed and updated ROS and plan today 2/11/2020.  In review of yesterday's note 2/10/2020, there are no changes except as documented above.

## 2020-02-11 NOTE — DISCHARGE PLANNING
Anticipated Discharge Disposition: SNF    Action: Obtained SNF choice from pt, faxed to [facility]    Barriers to Discharge: Placement    Plan: F/U with medical team, pt, CCA

## 2020-02-11 NOTE — PROGRESS NOTES
[facility] FAMILY MEDICINE PROGRESS NOTE     Attending: Dr. Mateo PORTER  Senior Resident: Sanjay Swanson M.D. (PGY-2)  Cj Resident: Efrain Perez M.D. (PGY-1)  PATIENT: Rob Webster; [id number]; 1965    ID: 54 y.o. male admitted for osteomyelitis of, and now s/p amputation of, R great toe    Overnight Events: He received his first dose of insulin Glargine 15mg yesterday evening. His sugars were better controlled overnight and have been under 200 since then.    Subjective: He is feeling well this morning and reports that his pain is well-controlled aside from when they changed the dressing. He is eating, drinking, voiding, and passing stool without difficulty. He denies any fever, shortness of breath, or chest pain.     OBJECTIVE:  Temp:  [36.7 °C (98 °F)-37 °C (98.6 °F)] 36.8 °C (98.3 °F)  Pulse:  [66-90] 88  Resp:  [15-17] 16  BP: (109-125)/(76-83) 120/78  SpO2:  [95 %-99 %] 95 %    Intake/Output Summary (Last 24 hours) at 2/11/2020 1427  Last data filed at 2/11/2020 0900  Gross per 24 hour   Intake 240 ml   Output 2075 ml   Net -1835 ml       PE:  Gen: No Acute Distress, laying comfortably in bed  HEENT: NCAT, MMM, EOMI  Pulm: CTABL, no respiratory distress   Cardio: RRR, normal S1S2, no murmurs   Abdom: Non-tender, non-distended, BS+  Ext: No edema, 2+ pulses     LABS:  Recent Labs     02/11/20  0000   WBC 10.1   RBC 4.15*   HEMOGLOBIN 11.5*   HEMATOCRIT 34.5*   MCV 83.1   MCH 27.7   RDW 38.5   PLATELETCT 429   MPV 8.9*   NEUTSPOLYS 70.70   LYMPHOCYTES 17.10*   MONOCYTES 6.90   EOSINOPHILS 1.90   BASOPHILS 0.90     Recent Labs     02/11/20  0000   SODIUM 133*   POTASSIUM 3.8   CHLORIDE 98   CO2 26   BUN 10   CREATININE 0.64   CALCIUM 8.9     Estimated GFR/CRCL = Estimated Creatinine Clearance: 123.4 mL/min (by C-G formula based on SCr of 0.64 mg/dL).  Recent Labs     02/10/20  2151 02/11/20  0000 02/11/20  0746 02/11/20  1125   GLUCOSE  --  116*  --   --    POCGLUCOSE 155*  --  145* 183*                  No results for input(s): INR, APTT, FIBRINOGEN in the last 72 hours.    Invalid input(s): DIMER    MICROBIOLOGY:   Blood culture collect 02/05 shows no growth    IMAGING:   IR-PICC LINE PLACEMENT W/ GUIDANCE > AGE 5   Final Result                  Ultrasound-guided PICC placement performed by qualified nursing staff as    above.          EC-ECHOCARDIOGRAM COMPLETE W/O CONT   Final Result      MR-FOOT-WITH & W/O RIGHT   Final Result      First metatarsal and proximal phalanx osteomyelitis with possible necrosis. First metatarsal-phalangeal effusion from septic arthropathy is not seen, indicating the joint fluid is likely draining through the dorsal soft tissues      Dorsal lateral great toe skin ulceration with probable draining sinus and underlying necrotic tissue interposed between the first and second metatarsal shafts, wrapping under the plantar aspect of the proximal first metatarsal shaft      Medial forefoot cellulitis, myositis      Mild midfoot osteoarthritis      US-RENAL ARTERY DUPLEX COMP   Final Result      DX-FOOT-COMPLETE 3+ RIGHT   Final Result      Mildly increased soft tissue swelling.      Periarticular erosion most conspicuous about the great toe proximal phalanx which could represent osteomyelitis end your septic arthritis. MRI without and with contrast is recommended for further evaluation.          MEDS:  Current Facility-Administered Medications   Medication Last Dose   • SODIUM CHLORIDE 0.9 % IV SOLN     • insulin regular (HUMULIN R) injection 5 Units      And   • glucose 4 g chewable tablet 16 g      And   • DEXTROSE 10% BOLUS 250 mL     • insulin regular (HUMULIN R) injection 2-10 Units     • insulin glargine (LANTUS) injection 15 Units 15 Units at 02/10/20 1811   • lisinopril (PRINIVIL) 10 MG tablet 10 mg Stopped at 02/11/20 0426   • metFORMIN (GLUCOPHAGE) tablet 500 mg 500 mg at 02/11/20 0842   • morphine (pf) 4 MG/ML injection 2 mg     • ceFAZolin in dextrose (ANCEF) IVPB premix 2 g  Stopped at 02/11/20 0453   • senna-docusate (PERICOLACE or SENOKOT S) 8.6-50 MG per tablet 2 Tab 2 Tab at 02/11/20 0425    And   • polyethylene glycol/lytes (MIRALAX) PACKET 1 Packet 1 Packet at 02/04/20 2038    And   • magnesium hydroxide (MILK OF MAGNESIA) suspension 30 mL      And   • bisacodyl (DULCOLAX) suppository 10 mg     • acetaminophen (TYLENOL) tablet 650 mg 650 mg at 02/09/20 1441   • atorvastatin (LIPITOR) tablet 40 mg 40 mg at 02/11/20 0425   • therapeutic multivitamin-minerals (THERAGRAN-M) tablet 1 Tab 1 Tab at 02/11/20 0425   • HYDROcodone/acetaminophen (NORCO)  MG per tablet 1 Tab 1 Tab at 02/10/20 2147       PROBLEM LIST:  No problems updated.    ASSESSMENT/PLAN:   55 yo male with DM type II and s/p ray amputation R great toe following osteomyelitis     # Osteomyelitis  -Continue IV ancef for 6 weeks total per ID recs from 2/7 as negative culture date (stop 03/20)  -Dressing changes daily  -Monitor for fevers, repeat CBC and cultures if spikes fever or other signs of infection return      # Ray amputation R great toe  -Dressing changes daily  -Heal weight bearing while in boot per ortho  -Repeat PT evaluation pending  -PRN pain medications, well controlled at this point     # MSSA bacteremia  -Staph pan sensitive  -Culture drawn 2/7 now negative x72 hours  -PICC line placed yesterday  -Likely discharge to SNF after seen by PT     #DM type II  -Last A1C 6.9 in January of 2020  -Suspect elevated sugars in hospital due to stress and infection reaction however many in 200s in last 24 hours  -Metformin 500 mg BID can increase to 1000 mg BID on 2/14  -Lantus 15 units every evening  -Regular insulin 5 units before meals  -Ideally will not need insulin long term with well controlled status prior to admission however post-op important to have tight control to promote wound healing  -Continue lisinopril for renal protection     #HLD  -Continue statin     Disposition: Inpatient until discharge  destination planned.    #Core Measures   VTE PPx:SCDs  Abx:Ancef IV  Lines/Tubes:PICC  Fluids:None  Diet: Diabetic  Code Status: Full      Efrain Perez M.D.   PGY-1  [facility]   [phone]

## 2020-02-11 NOTE — DISCHARGE PLANNING
Received Choice form at [phone]  Agency/Facility Name: [facility](1), [facility](2), [facility](3)  Referral sent per Choice form @ 3410

## 2020-02-11 NOTE — CARE PLAN
Problem: Safety  Goal: Will remain free from injury  Outcome: PROGRESSING AS EXPECTED  Fall precautions in place. Pt remains free from injury. Call light within   reach.     Problem: Mobility  Goal: Risk for activity intolerance will decrease  Outcome: PROGRESSING AS EXPECTED  Pt call staff appropriately for assistance out of bed. Pt is wearing off loading boot to RLE when out of bed.   Dressing changed to RLE per order.

## 2020-02-12 LAB
BACTERIA BLD CULT: NORMAL
BACTERIA BLD CULT: NORMAL
GLUCOSE BLD-MCNC: 151 MG/DL (ref 65–99)
GLUCOSE BLD-MCNC: 187 MG/DL (ref 65–99)
GLUCOSE BLD-MCNC: 99 MG/DL (ref 65–99)
SIGNIFICANT IND 70042: NORMAL
SIGNIFICANT IND 70042: NORMAL
SITE SITE: NORMAL
SITE SITE: NORMAL
SOURCE SOURCE: NORMAL
SOURCE SOURCE: NORMAL

## 2020-02-12 PROCEDURE — 700102 HCHG RX REV CODE 250 W/ 637 OVERRIDE(OP): Performed by: STUDENT IN AN ORGANIZED HEALTH CARE EDUCATION/TRAINING PROGRAM

## 2020-02-12 PROCEDURE — A9270 NON-COVERED ITEM OR SERVICE: HCPCS | Performed by: STUDENT IN AN ORGANIZED HEALTH CARE EDUCATION/TRAINING PROGRAM

## 2020-02-12 PROCEDURE — 99232 SBSQ HOSP IP/OBS MODERATE 35: CPT | Performed by: NURSE PRACTITIONER

## 2020-02-12 PROCEDURE — 700111 HCHG RX REV CODE 636 W/ 250 OVERRIDE (IP): Performed by: INTERNAL MEDICINE

## 2020-02-12 PROCEDURE — 82962 GLUCOSE BLOOD TEST: CPT

## 2020-02-12 PROCEDURE — 770006 HCHG ROOM/CARE - MED/SURG/GYN SEMI*

## 2020-02-12 RX ADMIN — HYDROCODONE BITARTRATE AND ACETAMINOPHEN 1 TABLET: 10; 325 TABLET ORAL at 17:52

## 2020-02-12 RX ADMIN — CEFAZOLIN SODIUM 2 G: 2 INJECTION, SOLUTION INTRAVENOUS at 13:08

## 2020-02-12 RX ADMIN — CEFAZOLIN SODIUM 2 G: 2 INJECTION, SOLUTION INTRAVENOUS at 04:34

## 2020-02-12 RX ADMIN — INSULIN GLARGINE 15 UNITS: 100 INJECTION, SOLUTION SUBCUTANEOUS at 17:57

## 2020-02-12 RX ADMIN — CEFAZOLIN SODIUM 2 G: 2 INJECTION, SOLUTION INTRAVENOUS at 21:54

## 2020-02-12 RX ADMIN — METFORMIN HYDROCHLORIDE 500 MG: 500 TABLET ORAL at 17:52

## 2020-02-12 RX ADMIN — METFORMIN HYDROCHLORIDE 500 MG: 500 TABLET ORAL at 09:08

## 2020-02-12 RX ADMIN — MULTIPLE VITAMINS W/ MINERALS TAB 1 TABLET: TAB at 04:34

## 2020-02-12 RX ADMIN — ATORVASTATIN CALCIUM 40 MG: 40 TABLET, FILM COATED ORAL at 04:40

## 2020-02-12 RX ADMIN — ACETAMINOPHEN 650 MG: 325 TABLET, FILM COATED ORAL at 21:57

## 2020-02-12 RX ADMIN — LISINOPRIL 10 MG: 10 TABLET ORAL at 04:34

## 2020-02-12 ASSESSMENT — PAIN SCALES - WONG BAKER: WONGBAKER_NUMERICALRESPONSE: DOESN'T HURT AT ALL

## 2020-02-12 NOTE — PROGRESS NOTES
[facility]   POST SURGICAL PROGRESS NOTE      HPI:  Rob Webster is a 54 y.o.  with a past medical history that includes type 2 diabetes, admitted 2/3/2020 for DM foot (HCC)  DM foot (HCC).   LPS has been consulted for R great toe abscess.       Patient reports on 1/17/2020 [place] in the [place] and his right great toe began to swell.  He took some Tylenol with no relief.  He tried Arnica gel but still no relief.  The toe became increasingly painful and swollen.  He went to the ER on 1/20/2020.  Was given some pain medicine and discharged home.  He returned to the ED on 2/3/2020 with increased erythema, edema, pain to his foot.  Patient also recalls around [place] time 2019.  Patient found a piece of metal that went through the top part of his shoe over his great toe.  He removed the metal, did not notice any bleeding or wound.      SURGERY DATE: 02/05/2020  PROCEDURE:   1.  Right foot irrigation and debridement to the level of skin, subcutaneous   tissue, tendon, bone.  2.  Right foot first ray amputation to the metatarsal shaft.  3.  Right first metatarsal bone biopsy.  4.  Right foot wound vacuum application of approximately 3x12 cm.    SURGERY DATE: 02/07/2020  PROCEDURE:   1.  Right foot irrigation and debridement.  2.  Right foot wound closure.    2/8/2020: Patient denies fevers, chills, nausea, vomiting.  Pain well controlled.   2/12/2020: Denies fevers, chills, nausea, vomiting.  States that he is walking in his offloading boot, putting weight to his heel.  Planning on discharging patient to SNF.      PERTINENT LPS RESULTS:   OR pathology   FINAL DIAGNOSIS:    A. Right great toe:         Section specimen demonstrates benign skin and subcutaneous          tissue with area of ulceration, associated marked acute          inflammation, necrosis and fibrinopurulent debris.         Underlying bone demonstrates areas of acute inflammation          consistent with osteomyelitis.          "Skin/soft tissue margin demonstrates areas of acute inflammation          and fibropurulent debris.         Bone resection margin demonstrates area of necrotic debris and          few scattered neutrophils focally extending to the inked          resection margin.  B. Right bone toe biopsy:         Small fragments of benign bone with intervening fibrofatty          marrow spaces demonstrating necrotic debris, few small          aggregates of neutrophils and reactive changes, overall          findings suspicious for osteomyelitis.      OR microbiology  Significant Indicator POSPositive (POS)    Source WND    Site Right Bone Toe Biopsy    Culture Result -Abnormal     Gram Stain Result Rare WBCs.   Few Gram positive cocci.    Culture Result Abnormal    Staphylococcus aureus   Heavy growth   See previous culture for sensitivity report.        SURGICAL SITE EXAM:      /78   Pulse 78   Temp 36.4 °C (97.5 °F) (Temporal)   Resp 18   Ht 1.6 m (5' 2.99\")   Wt 76.1 kg (167 lb 12.3 oz)   SpO2 95%   BMI 29.73 kg/m²     Pedal Pulses 2+ DP/PT  Foot warm to touch        Right first ray amputation site:  Incision approximated with sutures except to 2 areas  Eschar distal aspect, proximal to this there is a slight dehiscence with slightly pink then sanguinous drainage  At the proximal end of the incision slight dehiscence, no drainage  No pain with palpation  Erythema receding  Edema receding  No odor    RN to apply dressing, dressing orders updated         DIABETES MANAGEMENT:    Blood glucose:   Results from last 7 days   Lab Units 02/12/20  1231 02/12/20  0819 02/11/20  1735 02/11/20  1125 02/11/20  0746 02/10/20  2151 02/10/20  1730 02/10/20  1110   ACCU CHECK GLUCOSE 788 mg/dL 187* 151* 147* 183* 145* 155* 175* 266*     A1c:   Lab Results   Component Value Date/Time    HBA1C 6.9 (H) 01/21/2020 09:37 AM        INFECTION MANAGEMENT:    Results from last 7 days   Lab Units 02/11/20  0000   WBC 1501 K/uL 10.1   PLATELET " "COUNT 1518 K/uL 429     Wound culture results:   Results     Procedure Component Value Units Date/Time    Blood Culture [[id number]] Collected:  02/07/20 1206    Order Status:  Completed Specimen:  Blood from Peripheral Updated:  02/12/20 1300     Significant Indicator NEG     Source BLD     Site PERIPHERAL     Culture Result No growth after 5 days of incubation.    Narrative:       Per Hospital Policy: Only change Specimen Src: to \"Line\" if  specified by physician order.  Left Forearm/Arm    Blood Culture [[id number]] Collected:  02/07/20 1206    Order Status:  Completed Specimen:  Blood from Peripheral Updated:  02/12/20 1300     Significant Indicator NEG     Source BLD     Site PERIPHERAL     Culture Result No growth after 5 days of incubation.    Narrative:       Per Hospital Policy: Only change Specimen Src: to \"Line\" if  specified by physician order.  Left AC    BLOOD CULTURE [[id number]] Collected:  02/05/20 1655    Order Status:  Completed Specimen:  Blood from Peripheral Updated:  02/10/20 1900     Significant Indicator NEG     Source BLD     Site PERIPHERAL     Culture Result No growth after 5 days of incubation.    Narrative:       Per Hospital Policy: Only change Specimen Src: to \"Line\" if  specified by physician order.  Left Hand    BLOOD CULTURE x2 [[id number]] Collected:  02/03/20 1632    Order Status:  Completed Specimen:  Blood from Peripheral Updated:  02/08/20 2100     Significant Indicator NEG     Source BLD     Site PERIPHERAL     Culture Result No growth after 5 days of incubation.    Narrative:       Per Hospital Policy: Only change Specimen Src: to \"Line\" if  specified by physician order.  Right AC    Anaerobic Culture [[id number]] Collected:  02/05/20 1618    Order Status:  Completed Specimen:  Wound Updated:  02/08/20 1352     Significant Indicator NEG     Source WND     Site Right Bone Toe Biopsy     Culture Result No Anaerobes isolated.    CULTURE WOUND W/ GRAM STAIN [[id number]]  (Abnormal) " Collected:  02/05/20 1618    Order Status:  Completed Specimen:  Wound Updated:  02/08/20 1352     Significant Indicator POS     Source WND     Site Right Bone Toe Biopsy     Culture Result -     Gram Stain Result Rare WBCs.  Few Gram positive cocci.       Culture Result Staphylococcus aureus  Heavy growth  See previous culture for sensitivity report.      CULTURE WOUND W/ GRAM STAIN [[id number]]  (Abnormal)  (Susceptibility) Collected:  02/05/20 1555    Order Status:  Completed Specimen:  Wound Updated:  02/08/20 1352     Significant Indicator POS     Source WND     Site Right Great Toe Deep     Culture Result -     Gram Stain Result Moderate WBCs.  Few Gram positive cocci.       Culture Result Staphylococcus aureus  Heavy growth      Narrative:       Surgery - swabs received    Susceptibility     Staphylococcus aureus (1)     Antibiotic Interpretation Microscan Method Status    Azithromycin Sensitive <=2 mcg/mL BELEN Final    Clindamycin Sensitive <=0.5 mcg/mL BELEN Final    Cefazolin Sensitive <=8 mcg/mL BELEN Final    Ceftaroline Sensitive <=0.5 mcg/mL BELEN Final    Daptomycin Sensitive <=1 mcg/mL BELEN Final    Ampicillin/sulbactam Sensitive <=8/4 mcg/mL BELEN Final    Erythromycin Sensitive <=0.25 mcg/mL BELEN Final    Vancomycin Sensitive 1 mcg/mL BELEN Final    Oxacillin Sensitive <=0.25 mcg/mL BELEN Final    Pip/Tazobactam Sensitive <=4 mcg/mL BELEN Final    Trimeth/Sulfa Sensitive <=0.5/9.5 mcg/mL BELEN Final    Tetracycline Sensitive <=4 mcg/mL BELEN Final                   Anaerobic Culture [[id number]] Collected:  02/05/20 1555    Order Status:  Completed Specimen:  Wound Updated:  02/08/20 1352     Significant Indicator NEG     Source WND     Site Right Great Toe Deep     Culture Result No Anaerobes isolated.    Narrative:       Surgery - swabs received    BLOOD CULTURE [[id number]]  (Abnormal) Collected:  02/05/20 1655    Order Status:  Completed Specimen:  Blood from Peripheral Updated:  02/07/20 1036     Significant  "Indicator POS     Source BLD     Site PERIPHERAL     Culture Result Growth detected by Bactec instrument. 02/06/2020  18:16      Staphylococcus aureus  See previous culture for sensitivity report.      Narrative:       CALL  Mccormick  131 tel. [phone],  CALLED  131 tel. [phone] 02/06/2020, 18:20, RB PERF. RESULTS CALLED  TO:[phone]  Per Hospital Policy: Only change Specimen Src: to \"Line\" if  specified by physician order.  Left Forearm/Arm    BLOOD CULTURE x2 [[id number]]  (Abnormal)  (Susceptibility) Collected:  02/03/20 1632    Order Status:  Completed Specimen:  Blood from Peripheral Updated:  02/06/20 0835     Significant Indicator POS     Source BLD     Site PERIPHERAL     Culture Result Growth detected by Bactec instrument. 02/04/2020  19:13  Staphylococcus aureus (methicillin sensitive)  detected by PCR.        Staphylococcus aureus    Narrative:       CALL  Mccormick  131 tel. [phone],  CALLED  131 tel. [phone] 02/04/2020, 19:16, RB PERF. RESULTS CALLED TO: RN  [phone]  Per Hospital Policy: Only change Specimen Src: to \"Line\" if  specified by physician order.  Right Hand    Susceptibility     Staphylococcus aureus (1)     Antibiotic Interpretation Microscan Method Status    Azithromycin Sensitive <=2 mcg/mL BELEN Final    Clindamycin Sensitive <=0.5 mcg/mL BELEN Final    Cefazolin Sensitive <=8 mcg/mL BELEN Final    Ceftaroline Sensitive <=0.5 mcg/mL BELEN Final    Daptomycin Sensitive <=1 mcg/mL BELEN Final    Ampicillin/sulbactam Sensitive <=8/4 mcg/mL BELEN Final    Erythromycin Sensitive <=0.25 mcg/mL BELEN Final    Vancomycin Sensitive 1 mcg/mL BELEN Final    Oxacillin Sensitive <=0.25 mcg/mL BELEN Final    Pip/Tazobactam Sensitive <=4 mcg/mL BELEN Final    Trimeth/Sulfa Sensitive <=0.5/9.5 mcg/mL BELEN Final    Tetracycline Sensitive <=4 mcg/mL BELEN Final                   GRAM STAIN [[id number]] Collected:  02/05/20 1555    Order Status:  Completed Specimen:  Wound Updated:  02/05/20 2041     Significant Indicator .     " Source WND     Site Right Great Toe Deep     Gram Stain Result Moderate WBCs.  Few Gram positive cocci.      Narrative:       Surgery - swabs received    GRAM STAIN [[id number]] Collected:  02/05/20 1618    Order Status:  Completed Specimen:  Wound Updated:  02/05/20 2040     Significant Indicator .     Source WND     Site Right Bone Toe Biopsy     Gram Stain Result Rare WBCs.  Few Gram positive cocci.                 ASSESSMENT:   POD #5 S/P right foot I&D, delayed primary wound closure by Dr. Randall   S/P right first ray amputation on 2/5/2020 by Dr. Randall   Slight dehiscence, minimal drainage      PLAN      Wound care:   Wound care orders updated for RN, continue daily dressing changes    Vascular status: Palpable pulses    Antibiotics: Per ID recommendation continue IV antibiotics.  Slight purulent tinged drainage. Pathology positive for OM.  Recommend antibiotics for 6 weeks    Weight Bearing Status: Heel weight bearing    Offloading: Offloading boot  when ambulating    PT/OT : involved,     Diabetes Education: involved, 2/11/20    - Implications of loss of protective sensation (LOPS) discussed with patient- including increased risk for amputation.  Advised to check feet at least daily, moisturize feet, and to always wear protective foot wear.   -avoid trimming own nails. See podiatrist or certified foot and nail RN  -keep blood sugars <150 for improved wound healing    Plan to return to O.R.: no further surgeries planned at this time      DISCHARGE PLAN:    Disposition: SNF possible [facility].  Okay to discharge from LPS/Ortho standpoint    Follow-up: LPS rounds in [facility] on 2/28/20, sutures to be removed at this appointment, follow-up with foot and ankle surgeon  Will also need prescription for diabetic shoes and inserts.  Insurance only approved for  orthotics      CHANTAL [email or web address].    If any questions or concerns, please call [phone]

## 2020-02-12 NOTE — THERAPY
"Physical Therapy Treatment completed.   Bed Mobility:  Supine to Sit: Supervised (HOB flat, no rail)  Transfers: Sit to Stand: Supervised  Gait: Level Of Assist: Supervised with Front-Wheel Walker       Plan of Care: Will benefit from Physical Therapy 3 times per week  Discharge Recommendations: Equipment: Front-Wheel Walker. Post-acute therapy: see below.    See \"Rehab Therapy-Acute\" Patient Summary Report for complete documentation.     Patient progressing well with functional mobility. He ambulated approximately 100ft with FWW and offloading boot on RLE with supervision. He ascended/descended two steps with rail, step to gait, and supervision, verbal cueing for technique. He maintained heel WB RLE throughout session. He is at safe level functional mobility to return home but patient reported concerns regarding wound care and IV antibiotics, patient would likely benefit from SNF for these services but will defer to other disciplines. Will continue to follow while in house.  "

## 2020-02-12 NOTE — DISCHARGE PLANNING
Spoke to Dr. Swanson regarding pt dc needs given last PT note.  Will research whether pts insurance will authorize SNF based on pts wounds and needed IV abx therapy.

## 2020-02-12 NOTE — DISCHARGE PLANNING
Spoke to Amy Leary with [employer] having [facility] insurance auth for SNF approval.

## 2020-02-12 NOTE — CARE PLAN
Problem: Infection  Goal: Will remain free from infection  Outcome: PROGRESSING AS EXPECTED  Standard infection prevention measures in place.     Problem: Pain Management  Goal: Pain level will decrease to patient's comfort goal  Outcome: PROGRESSING AS EXPECTED  Pt pain is managed at his comfort level. Dressing changed to right foot, with minimal pain.

## 2020-02-12 NOTE — PROGRESS NOTES
[facility] [place] FAMILY MEDICINE PROGRESS NOTE     Attending:   James    Resident:   Sanjay Swanson M.D.    PATIENT:   Rob Webster; [id number]; 1965    ID:   54 y.o. male admitted for osteomyelitis and MSSA bacteremia now s/p ray amputation of R great toe    SUBJECTIVE:   No acute events overnight, pt reports doing well this am. Has no abd pain, N/V/D, toe pain well controlled, ambulating.     OBJECTIVE:  Vitals:    02/11/20 1600 02/11/20 2200 02/12/20 0500 02/12/20 0823   BP: 139/75 143/72 112/74 119/78   Pulse: 91 80 84 78   Resp: 17 15 15 18   Temp: 36.4 °C (97.5 °F) 36.3 °C (97.4 °F) 36.6 °C (97.8 °F) 36.4 °C (97.5 °F)   TempSrc: Temporal Temporal Temporal Temporal   SpO2: 96% 96% 95% 95%   Weight:       Height:           Intake/Output Summary (Last 24 hours) at 2/12/2020 1130  Last data filed at 2/12/2020 0300  Gross per 24 hour   Intake 540 ml   Output 1400 ml   Net -860 ml       PHYSICAL EXAM:  General: No acute distress, afebrile, resting comfortably  HEENT: NC/AT. EOMI. MMM, neck supple without adenopathy  Cardiovascular: RRR, normal S1/S2 no murmurs rubs, or gallops, cap refill brisk.  Respiratory: CTAB, no tachypnea or retractions  Abdomen: soft, NT/ND, no masses  EXT:  No rashes or skin changes noted. Pulses 2+ DP and radial bilaterally, RLE dressed and dressing intact and dry  Neuro: Non-focal    LABS:  Recent Labs     02/11/20  0000   WBC 10.1   RBC 4.15*   HEMOGLOBIN 11.5*   HEMATOCRIT 34.5*   MCV 83.1   MCH 27.7   RDW 38.5   PLATELETCT 429   MPV 8.9*   NEUTSPOLYS 70.70   LYMPHOCYTES 17.10*   MONOCYTES 6.90   EOSINOPHILS 1.90   BASOPHILS 0.90     Recent Labs     02/11/20  0000   SODIUM 133*   POTASSIUM 3.8   CHLORIDE 98   CO2 26   BUN 10   CREATININE 0.64   CALCIUM 8.9     Estimated GFR/CRCL = Estimated Creatinine Clearance: 123.4 mL/min (by C-G formula based on SCr of 0.64 mg/dL).  Recent Labs     02/11/20  0000  02/11/20  1125 02/11/20  1735 02/12/20  0819   GLUCOSE 116*  --   --   --   --     POCGLUCOSE  --    < > 183* 147* 151*    < > = values in this interval not displayed.       IMAGING:  None new    MEDS:  Current Facility-Administered Medications   Medication Last Dose   • insulin regular (HUMULIN R) injection 5 Units 5 Units at 02/12/20 0909    And   • glucose 4 g chewable tablet 16 g      And   • DEXTROSE 10% BOLUS 250 mL     • insulin regular (HUMULIN R) injection 1-4 Units Stopped at 02/11/20 1742   • insulin glargine (LANTUS) injection 15 Units 15 Units at 02/11/20 1745   • lisinopril (PRINIVIL) 10 MG tablet 10 mg 10 mg at 02/12/20 0434   • metFORMIN (GLUCOPHAGE) tablet 500 mg 500 mg at 02/12/20 0908   • morphine (pf) 4 MG/ML injection 2 mg     • ceFAZolin in dextrose (ANCEF) IVPB premix 2 g Stopped at 02/12/20 0504   • senna-docusate (PERICOLACE or SENOKOT S) 8.6-50 MG per tablet 2 Tab 2 Tab at 02/11/20 0425    And   • polyethylene glycol/lytes (MIRALAX) PACKET 1 Packet 1 Packet at 02/04/20 2038    And   • magnesium hydroxide (MILK OF MAGNESIA) suspension 30 mL      And   • bisacodyl (DULCOLAX) suppository 10 mg     • acetaminophen (TYLENOL) tablet 650 mg 650 mg at 02/09/20 1441   • atorvastatin (LIPITOR) tablet 40 mg 40 mg at 02/12/20 0440   • therapeutic multivitamin-minerals (THERAGRAN-M) tablet 1 Tab 1 Tab at 02/12/20 0434   • HYDROcodone/acetaminophen (NORCO)  MG per tablet 1 Tab 1 Tab at 02/11/20 2210       ASSESSMENT/PLAN:  55 yo male with DM type II and s/p ray amputation R great toe following osteomyelitis     # Osteomyelitis  -Continue IV ancef for 6 weeks total per ID recs from 2/7 as negative culture date (stop 03/20)  -Dressing changes daily  -Monitor for fevers, repeat CBC and cultures if spikes fever or other signs of infection return      # Ray amputation R great toe  -Dressing changes daily  -Heal weight bearing while in boot per ortho  -Repeat PT eval states does not need SNF for rehab purposes  -PRN pain medications, well controlled at this point     # MSSA  bacteremia  -Staph pan sensitive  -Culture drawn 2/7 now negative x72 hours  -PICC line in place  -Likely discharge to SNF after seen by PT     #DM type II  -Last A1C 6.9 in January of 2020  -Suspect elevated sugars in hospital due to stress and infection reaction however many in 200s in last 24 hours  -Metformin 500 mg BID can increase to 1000 mg BID on 2/14  -Lantus 15 units every evening  -Regular insulin 5 units before meals with sliding scale on top  -Ideally will not need insulin long term with well controlled status prior to admission however post-op important to have tight control to promote wound healing  -Continue lisinopril for renal protection     #HLD  -Continue statin      Disposition: Inpatient until discharge destination planned.     #Core Measures   VTE PPx:SCDs  Abx:Ancef IV  Lines/Tubes:PICC  Fluids:None  Diet: Diabetic  Code Status: Full

## 2020-02-12 NOTE — CONSULTS
Diabetes education: Met with pt who has a hx of diabetes on Metformin at home. Pt was admitted with blood sugar of 245 and Hg a1c of 6.9%. pt is currently on Lantus 15 units pm with regular insulin sliding scale coverage ac with regular insulin meal bolus of 5 units ac as well. New sliding scale coverage starts at 200. Previous scale was for for 150 , ac and hs to include meal bolus as well. Blood sugars have been:  2/10: 175 (1 + 5 units), at 2152 155 ( 1 + 5 units ).  2/11: 145 ( no meal bolus)  And 183 ( 1 + 5 units). CDE called Dr. Swanson for clarification on meal bolus/sliding scale coverage. Orders changed. Blood sugar ac dinner was 147.  Discussed with pt need to practice insulin with nursing. Pt to go to SNF for antibiotics and may not need insulin at discharge but better to practice. Pt has a meter and strips at home to test blood sugars. Discussed what effects blood sugars, goals for blood sugars and when to call MD.  Plan: CDE to continue to follow. Please call [phone] if needs change.

## 2020-02-12 NOTE — PROGRESS NOTES
Diabetes education: Met with pt this afternoon. Please see consult note. CDE will continue to follow. Please have pt give insulin with nursing. Please call [phone] if needs change.

## 2020-02-13 LAB
GLUCOSE BLD-MCNC: 117 MG/DL (ref 65–99)
GLUCOSE BLD-MCNC: 149 MG/DL (ref 65–99)
GLUCOSE BLD-MCNC: 199 MG/DL (ref 65–99)

## 2020-02-13 PROCEDURE — 700102 HCHG RX REV CODE 250 W/ 637 OVERRIDE(OP): Performed by: STUDENT IN AN ORGANIZED HEALTH CARE EDUCATION/TRAINING PROGRAM

## 2020-02-13 PROCEDURE — 770006 HCHG ROOM/CARE - MED/SURG/GYN SEMI*

## 2020-02-13 PROCEDURE — A9270 NON-COVERED ITEM OR SERVICE: HCPCS | Performed by: STUDENT IN AN ORGANIZED HEALTH CARE EDUCATION/TRAINING PROGRAM

## 2020-02-13 PROCEDURE — 99232 SBSQ HOSP IP/OBS MODERATE 35: CPT | Performed by: INTERNAL MEDICINE

## 2020-02-13 PROCEDURE — 700111 HCHG RX REV CODE 636 W/ 250 OVERRIDE (IP): Performed by: INTERNAL MEDICINE

## 2020-02-13 PROCEDURE — 82962 GLUCOSE BLOOD TEST: CPT

## 2020-02-13 RX ADMIN — CEFAZOLIN SODIUM 2 G: 2 INJECTION, SOLUTION INTRAVENOUS at 04:31

## 2020-02-13 RX ADMIN — ATORVASTATIN CALCIUM 40 MG: 40 TABLET, FILM COATED ORAL at 04:31

## 2020-02-13 RX ADMIN — MULTIPLE VITAMINS W/ MINERALS TAB 1 TABLET: TAB at 04:31

## 2020-02-13 RX ADMIN — METFORMIN HYDROCHLORIDE 1000 MG: 500 TABLET ORAL at 17:52

## 2020-02-13 RX ADMIN — CEFAZOLIN SODIUM 2 G: 2 INJECTION, SOLUTION INTRAVENOUS at 14:36

## 2020-02-13 RX ADMIN — HYDROCODONE BITARTRATE AND ACETAMINOPHEN 1 TABLET: 10; 325 TABLET ORAL at 19:48

## 2020-02-13 RX ADMIN — CEFAZOLIN SODIUM 2 G: 2 INJECTION, SOLUTION INTRAVENOUS at 21:56

## 2020-02-13 RX ADMIN — INSULIN GLARGINE 15 UNITS: 100 INJECTION, SOLUTION SUBCUTANEOUS at 17:56

## 2020-02-13 RX ADMIN — METFORMIN HYDROCHLORIDE 500 MG: 500 TABLET ORAL at 08:58

## 2020-02-13 ASSESSMENT — ENCOUNTER SYMPTOMS
NAUSEA: 0
DIARRHEA: 0
HEADACHES: 0
SENSORY CHANGE: 1
ABDOMINAL PAIN: 0
DIZZINESS: 0
CHILLS: 0
SHORTNESS OF BREATH: 0
FEVER: 0
VOMITING: 0
COUGH: 0

## 2020-02-13 NOTE — PROGRESS NOTES
[facility] [place] FAMILY MEDICINE PROGRESS NOTE     Attending:   Hand    Resident:   Sanjay Swanson M.D.    PATIENT:   Rob Webster; [id number]; 1965    ID:   54 y.o. male admitted for osteomyelitis and MSSA bacteremia.     SUBJECTIVE:   No acute events overnight, pt pain is well controlled, no fevers, no chills, no N/V, able to ambulate with walking boot.    OBJECTIVE:  Vitals:    02/12/20 1540 02/12/20 2000 02/13/20 0400 02/13/20 0800   BP: 115/72 112/76 104/62 113/75   Pulse: 95 90 83 80   Resp: 17 17 17 17   Temp: 36.3 °C (97.3 °F) 36.6 °C (97.8 °F) 36.4 °C (97.5 °F) 36.4 °C (97.6 °F)   TempSrc: Temporal Temporal Temporal Temporal   SpO2: 98% 96% 98% 96%   Weight:       Height:           Intake/Output Summary (Last 24 hours) at 2/13/2020 1238  Last data filed at 2/13/2020 0900  Gross per 24 hour   Intake 240 ml   Output 1250 ml   Net -1010 ml       PHYSICAL EXAM:  General: No acute distress, afebrile, resting comfortably  HEENT: NC/AT. EOMI. MMM, neck supple without adenopathy  Cardiovascular: RRR, normal S1/S2 no murmurs rubs, or gallops, cap refill brisk.  Respiratory: CTAB, no tachypnea or retractions  Abdomen: soft, NT/ND, no masses  EXT:  RLE in sterile dressing that is dry and intact  Neuro: Non-focal    LABS:  Recent Labs     02/11/20  0000   WBC 10.1   RBC 4.15*   HEMOGLOBIN 11.5*   HEMATOCRIT 34.5*   MCV 83.1   MCH 27.7   RDW 38.5   PLATELETCT 429   MPV 8.9*   NEUTSPOLYS 70.70   LYMPHOCYTES 17.10*   MONOCYTES 6.90   EOSINOPHILS 1.90   BASOPHILS 0.90     Recent Labs     02/11/20  0000   SODIUM 133*   POTASSIUM 3.8   CHLORIDE 98   CO2 26   BUN 10   CREATININE 0.64   CALCIUM 8.9     Estimated GFR/CRCL = Estimated Creatinine Clearance: 123.4 mL/min (by C-G formula based on SCr of 0.64 mg/dL).  Recent Labs     02/11/20  0000  02/12/20  1755 02/13/20  0812 02/13/20  1113   GLUCOSE 116*  --   --   --   --    POCGLUCOSE  --    < > 99 149* 199*    < > = values in this interval not displayed.       IMAGING:  None  new    MEDS:  Current Facility-Administered Medications   Medication Last Dose   • insulin regular (HUMULIN R) injection 5 Units 5 Units at 02/13/20 0856    And   • glucose 4 g chewable tablet 16 g      And   • DEXTROSE 10% BOLUS 250 mL     • insulin regular (HUMULIN R) injection 1-4 Units Stopped at 02/11/20 1742   • insulin glargine (LANTUS) injection 15 Units 15 Units at 02/12/20 1757   • lisinopril (PRINIVIL) 10 MG tablet 10 mg Stopped at 02/13/20 0431   • metFORMIN (GLUCOPHAGE) tablet 500 mg 500 mg at 02/13/20 0858   • morphine (pf) 4 MG/ML injection 2 mg     • ceFAZolin in dextrose (ANCEF) IVPB premix 2 g Stopped at 02/13/20 0501   • senna-docusate (PERICOLACE or SENOKOT S) 8.6-50 MG per tablet 2 Tab 2 Tab at 02/11/20 0425    And   • polyethylene glycol/lytes (MIRALAX) PACKET 1 Packet 1 Packet at 02/04/20 2038    And   • magnesium hydroxide (MILK OF MAGNESIA) suspension 30 mL      And   • bisacodyl (DULCOLAX) suppository 10 mg     • acetaminophen (TYLENOL) tablet 650 mg 650 mg at 02/12/20 2157   • atorvastatin (LIPITOR) tablet 40 mg 40 mg at 02/13/20 0431   • therapeutic multivitamin-minerals (THERAGRAN-M) tablet 1 Tab 1 Tab at 02/13/20 0431   • HYDROcodone/acetaminophen (NORCO)  MG per tablet 1 Tab 1 Tab at 02/12/20 1752       ASSESSMENT/PLAN:    # Osteomyelitis  -Continue IV ancef for 6 weeks total per ID recs from 2/7 as negative culture date (stop 03/20)  -Dressing changes daily  -Monitor for fevers, repeat CBC and cultures if spikes fever or other signs of infection return      # Ray amputation R great toe  -Dressing changes daily  -Heal weight bearing while in boot per ortho  -Repeat PT eval states does not need SNF for rehab purposes  -PRN pain medications, well controlled at this point     # MSSA bacteremia  -Staph pan sensitive  -Culture drawn 2/7 now negative x72 hours  -PICC line in place  -Likely discharge to SNF after seen by PT     #DM type II  -Last A1C 6.9 in January of 2020  -Suspect  elevated sugars in hospital due to stress and infection reaction however many in 200s in last 24 hours  -Metformin 500 mg BID can increase to 1000 mg today  -Lantus 15 units every evening  -Regular insulin 5 units before meals with sliding scale on top  -Ideally will not need insulin long term with well controlled status prior to admission however post-op important to have tight control to promote wound healing  -Continue lisinopril for renal protection     #HLD  -Continue statin      Disposition: Inpatient until discharge destination planned.     #Core Measures   VTE PPx:SCDs  Abx:Ancef IV  Lines/Tubes:PICC  Fluids:None  Diet: Diabetic  Code Status: Full

## 2020-02-13 NOTE — PROGRESS NOTES
Infectious Disease Progress Note    Author: Kaylan Cooper M.D. Date & Time of service: 2020  12:59 PM    Chief Complaint:  Follow-up for MSSA sepsis/right foot osteomyelitis    Interval History:  54-year-old diabetic male with recent first right toe traumatic injury on 2020 admitted 2/3/2020 with a 2-week history of worsening right first toe inflammation, erythema and pain   afebrile, no CBC.  Patient resting comfortably with no new complaints.  Family at bedside.  Plan of care discussed in detail with patient at bedside.  He states he will be transferred to a nursing skilled facility at discharge.   AF WBC12.5 states pain controlled-denies SE abx Appetite good  2/10 AF no new complaints-surgical dressing still in place. Denies SE abx   AF WBC 10.1 got PICC tolerating abx-states probable dc to SNF today. Wound reviewed   AF feels fine-still awaiting insurance approval for discharge  Labs Reviewed and Medications Reviewed.     Review of Systems:  Review of Systems   Constitutional: Negative for chills and fever.   Respiratory: Negative for cough and shortness of breath.    Cardiovascular: Negative for chest pain and leg swelling.   Gastrointestinal: Negative for abdominal pain, diarrhea, nausea and vomiting.   Skin: Negative for rash.   Neurological: Positive for sensory change. Negative for dizziness and headaches.   All other systems reviewed and are negative.      Hemodynamics:  Temp (24hrs), Av.4 °C (97.6 °F), Min:36.3 °C (97.3 °F), Max:36.6 °C (97.8 °F)  Temperature: (Q8 per RN )  Pulse  Av.2  Min: 66  Max: 107   Blood Pressure: 113/75       Physical Exam:  Physical Exam  Vitals signs and nursing note reviewed.   Constitutional:       General: He is not in acute distress.     Appearance: He is not ill-appearing or toxic-appearing.   HENT:      Nose: No congestion or rhinorrhea.      Mouth/Throat:      Mouth: Mucous membranes are moist.      Pharynx: No oropharyngeal  exudate.   Eyes:      General: No scleral icterus.     Extraocular Movements: Extraocular movements intact.      Conjunctiva/sclera: Conjunctivae normal.      Pupils: Pupils are equal, round, and reactive to light.   Neck:      Musculoskeletal: Neck supple.   Cardiovascular:      Rate and Rhythm: Normal rate and regular rhythm.   Pulmonary:      Effort: Pulmonary effort is normal. No respiratory distress.      Breath sounds: No stridor. No wheezing or rhonchi.   Abdominal:      General: There is no distension.      Palpations: Abdomen is soft.      Tenderness: There is no abdominal tenderness. There is no guarding.   Musculoskeletal:      Right lower leg: No edema.      Left lower leg: No edema.      Comments: Right foot surgical well-approximated with sutures small amount of bleeding around sutures; no dehiscence seen  No edema or erythema  RUE PICC nontender   Lymphadenopathy:      Cervical: No cervical adenopathy.   Skin:     General: Skin is warm.      Coloration: Skin is not jaundiced.      Findings: No bruising.   Neurological:      General: No focal deficit present.      Mental Status: He is alert and oriented to person, place, and time.      Cranial Nerves: No cranial nerve deficit.   Psychiatric:         Mood and Affect: Mood normal.         Behavior: Behavior normal.      Comments: Pleasant         Meds:    Current Facility-Administered Medications:   •  metFORMIN  •  insulin regular **AND** Accu-Chek ACHS **AND** NOTIFY MD and PharmD **AND** glucose **AND** dextrose 10% bolus  •  insulin regular  •  insulin glargine **AND** [DISCONTINUED] insulin lispro **AND** [CANCELED] Accu-Chek Q6 if NPO **AND** [CANCELED] NOTIFY MD and PharmD **AND** [DISCONTINUED] glucose **AND** [DISCONTINUED] dextrose 10% bolus  •  lisinopril  •  morphine injection  •  ceFAZolin  •  senna-docusate **AND** polyethylene glycol/lytes **AND** magnesium hydroxide **AND** bisacodyl  •  acetaminophen  •  atorvastatin  •  therapeutic  multivitamin-minerals  •  HYDROcodone/acetaminophen    Labs:  Recent Labs     02/11/20  0000   WBC 10.1   RBC 4.15*   HEMOGLOBIN 11.5*   HEMATOCRIT 34.5*   MCV 83.1   MCH 27.7   RDW 38.5   PLATELETCT 429   MPV 8.9*   NEUTSPOLYS 70.70   LYMPHOCYTES 17.10*   MONOCYTES 6.90   EOSINOPHILS 1.90   BASOPHILS 0.90     Recent Labs     02/11/20  0000   SODIUM 133*   POTASSIUM 3.8   CHLORIDE 98   CO2 26   GLUCOSE 116*   BUN 10     Recent Labs     02/11/20  0000   CREATININE 0.64       Imaging:  Dx-foot-complete 3+ Right    Result Date: 2/3/2020  2/3/2020 6:28 PM HISTORY/REASON FOR EXAM:  Pain/Deformity Following Trauma Redness, swelling and pain TECHNIQUE/EXAM DESCRIPTION AND NUMBER OF VIEWS: 3 views of the RIGHT foot. COMPARISON:  1/20/2020 FINDINGS: There is no fracture or dislocation. The visualized osseous structures are in anatomic alignment. There are mild degenerative changes of the great toe MTP joint. There is some soft tissue swelling of the great toe and in the dorsum of the foot. There is suggestion of small marginal erosion medial proximal phalangeal base of the great toe, not well seen on prior study. Cannot exclude osteomyelitis or septic arthritis. MRI without and with contrast is recommended for further evaluation. Achilles and plantar calcaneal spurs. Extensive atherosclerosis.     Mildly increased soft tissue swelling. Periarticular erosion most conspicuous about the great toe proximal phalanx which could represent osteomyelitis end your septic arthritis. MRI without and with contrast is recommended for further evaluation.    Dx-foot-complete 3+ Right    Result Date: 1/20/2020 1/20/2020 5:58 AM HISTORY/REASON FOR EXAM:  Pain/Deformity Following Trauma Great toe injury, pain TECHNIQUE/EXAM DESCRIPTION AND NUMBER OF VIEWS: 3 views of the RIGHT foot. COMPARISON:  None. FINDINGS: There is no fracture or dislocation. Small calcific density adjacent to the distal phalangeal neck of the great toe measuring 3 mm of  uncertain etiology or significance. The visualized osseous structures are in anatomic alignment. Minimal degenerative changes great toe MTP joint within the interphalangeal joints. Plantar and Achilles calcaneal spurs. Bone mineralization is age-appropriate.. Prominent atherosclerotic arterial calcifications. Soft tissue swelling dorsum of the foot.     Soft tissue swelling without acute osseous abnormality. If pain persists, repeat radiographs in 7-10 days is recommended.    Mr-foot-with & W/o Right    Result Date: 2/4/2020 2/4/2020 1:46 PM HISTORY/REASON FOR EXAM:  Osteomyelitis suspected, diabetic. Great toe swelling and pain for 2 weeks. TECHNIQUE/EXAM DESCRIPTION: MRI of the RIGHT foot with and without contrast. Using a [employer] 1.5 Rosa MRI scanner, T1 axial, sagittal, and coronal, fast spin-echo T2 fat-suppressed axial and coronal, fast inversion recovery sagittal, and T1 fat suppressed axial and sagittal post intravenous contrast images were obtained. A total of 15 mL ProHance given. COMPARISON:  Radiographs February 3. FINDINGS: There is considerable first ray abnormality. There is fatty marrow replacement with increased T2, decreased T1 signal throughout the first metatarsal and proximal phalanx. The abnormal signal extends all the way to the first metatarsal base. There is no abnormal first TMT effusion. There is patchy bony enhancement in the distal first metatarsal neck, head and just anterior to the metatarsal base. There are some areas of absent enhancement at the plantar first metatarsal base as well as in the lateral metatarsal head which could indicate necrosis. There is edema and enhancement tracking along the metatarsal shaft but this has a rind of absent enhancement measuring up to 7 mm and this is compatible with necrotic tissue favored over abscess because this does not have as high a T2 signal as would be expected for abscess. Both are possible. There is absent enhancement which wraps  around the lateral margin of the mid metatarsal shaft into the dorsal forefoot soft tissues anteriorly where there is a dorsal proximal phalanx level area of skin ulceration  and absent enhancement. This may be a draining sinus There is mild edema and enhancement centered at the medial intercuneiform articulation and this is most likely degenerative The remainder of the bony structures are normal. There is no subluxation or bony fragmentation. There is diffuse forefoot and midfoot soft tissue edema especially along the dorsum subcutaneous fat. There is skin thickening and enhancement anteromedially in the forefoot. There is some intrinsic musculature enhancement centered at the first but also involving the second and third rays No abnormal joint effusion is seen No abnormal flexor or extensor tendon signal is noted     First metatarsal and proximal phalanx osteomyelitis with possible necrosis. First metatarsal-phalangeal effusion from septic arthropathy is not seen, indicating the joint fluid is likely draining through the dorsal soft tissues Dorsal lateral great toe skin ulceration with probable draining sinus and underlying necrotic tissue interposed between the first and second metatarsal shafts, wrapping under the plantar aspect of the proximal first metatarsal shaft Medial forefoot cellulitis, myositis Mild midfoot osteoarthritis    Ec-echocardiogram Complete W/o Cont    Result Date: 2/6/2020  Transthoracic Echo Report Echocardiography Laboratory CONCLUSIONS No prior study is available for comparison. Normal left ventricular systolic function. No evidence of valvular abnormality based on Doppler evaluation.Time             LV EF:  65    % FINDINGS Left Ventricle Normal left ventricular chamber size. Normal left ventricular wall thickness. Normal left ventricular systolic function. Left ventricular ejection fraction is visually estimated to be 65%. Normal regional wall motion. Normal diastolic function. Right  Ventricle Normal right ventricular size and systolic function. Right Atrium Normal right atrial size. Normal inferior vena cava size and inspiratory collapse. Left Atrium Normal left atrial size. Left atrial volume index is 28 mL/sq m. Mitral Valve Structurally normal mitral valve. No mitral stenosis. Trace mitral regurgitation. Aortic Valve Structurally normal aortic valve. No aortic stenosis. No aortic insufficiency. Tricuspid Valve Structurally normal tricuspid valve. No tricuspid stenosis. Trace tricuspid regurgitation. Unable to estimate pulmonary artery pressure due to an inadequate tricuspid regurgitant jet. Pulmonic Valve Structurally normal pulmonic valve. No pulmonic stenosis. No pulmonic insufficiency. Pericardium No pericardial effusion seen. Aorta Normal aortic root for body surface area. Ascending aorta diameter is 3.2 cm. Rivera Nolasco (Electronically Signed) Final Date:     06 February 2020                 12:09    Us-renal Artery Duplex Comp    Result Date: 2/4/2020  Renal Artery Duplex  Ultrasound Report  Vascular Laboratory  CONCLUSIONS  No evidence of renal artery stenosis.                     Appearance                          Normal           Normal                    Perfusion Scan                      Normal  FINDINGS  Widening of the abdominal aorta to a maximum diameter of 2.6 cm.  Velocities and waveforms are normal through the bilateral renal arteries.  Waveforms of the bilateral renal veins are normal.  Resistive indices are normal at the bilateral renal zhou.  Bilateral kidney size, perfusion and tissue densities appear normal.  Renal aortic ratios are within normal limits: Right: 0.6 to 1.4, Left: 0.4 to   0.75.  Isidro Rodriguez MD  (Electronically Signed)  Final Date:      04 February 2020 14:39      Micro:  Results     Procedure Component Value Units Date/Time    Blood Culture [[id number]] Collected:  02/07/20 1206    Order Status:  Completed Specimen:  Blood from  "Peripheral Updated:  02/12/20 1300     Significant Indicator NEG     Source BLD     Site PERIPHERAL     Culture Result No growth after 5 days of incubation.    Narrative:       Per Hospital Policy: Only change Specimen Src: to \"Line\" if  specified by physician order.  Left Forearm/Arm    Blood Culture [[id number]] Collected:  02/07/20 1206    Order Status:  Completed Specimen:  Blood from Peripheral Updated:  02/12/20 1300     Significant Indicator NEG     Source BLD     Site PERIPHERAL     Culture Result No growth after 5 days of incubation.    Narrative:       Per Hospital Policy: Only change Specimen Src: to \"Line\" if  specified by physician order.  Left AC    BLOOD CULTURE [[id number]] Collected:  02/05/20 1655    Order Status:  Completed Specimen:  Blood from Peripheral Updated:  02/10/20 1900     Significant Indicator NEG     Source BLD     Site PERIPHERAL     Culture Result No growth after 5 days of incubation.    Narrative:       Per Hospital Policy: Only change Specimen Src: to \"Line\" if  specified by physician order.  Left Hand    BLOOD CULTURE x2 [[id number]] Collected:  02/03/20 1632    Order Status:  Completed Specimen:  Blood from Peripheral Updated:  02/08/20 2100     Significant Indicator NEG     Source BLD     Site PERIPHERAL     Culture Result No growth after 5 days of incubation.    Narrative:       Per Hospital Policy: Only change Specimen Src: to \"Line\" if  specified by physician order.  Right AC    Anaerobic Culture [[id number]] Collected:  02/05/20 1618    Order Status:  Completed Specimen:  Wound Updated:  02/08/20 1352     Significant Indicator NEG     Source WND     Site Right Bone Toe Biopsy     Culture Result No Anaerobes isolated.    CULTURE WOUND W/ GRAM STAIN [[id number]]  (Abnormal) Collected:  02/05/20 1618    Order Status:  Completed Specimen:  Wound Updated:  02/08/20 1352     Significant Indicator POS     Source WND     Site Right Bone Toe Biopsy     Culture Result -     Gram Stain " Result Rare WBCs.  Few Gram positive cocci.       Culture Result Staphylococcus aureus  Heavy growth  See previous culture for sensitivity report.      CULTURE WOUND W/ GRAM STAIN [[id number]]  (Abnormal)  (Susceptibility) Collected:  02/05/20 1555    Order Status:  Completed Specimen:  Wound Updated:  02/08/20 1352     Significant Indicator POS     Source WND     Site Right Great Toe Deep     Culture Result -     Gram Stain Result Moderate WBCs.  Few Gram positive cocci.       Culture Result Staphylococcus aureus  Heavy growth      Narrative:       Surgery - swabs received    Susceptibility     Staphylococcus aureus (1)     Antibiotic Interpretation Microscan Method Status    Azithromycin Sensitive <=2 mcg/mL BELEN Final    Clindamycin Sensitive <=0.5 mcg/mL BELEN Final    Cefazolin Sensitive <=8 mcg/mL BELEN Final    Ceftaroline Sensitive <=0.5 mcg/mL BELEN Final    Daptomycin Sensitive <=1 mcg/mL BELEN Final    Ampicillin/sulbactam Sensitive <=8/4 mcg/mL BELEN Final    Erythromycin Sensitive <=0.25 mcg/mL BELEN Final    Vancomycin Sensitive 1 mcg/mL BELEN Final    Oxacillin Sensitive <=0.25 mcg/mL BELEN Final    Pip/Tazobactam Sensitive <=4 mcg/mL BELEN Final    Trimeth/Sulfa Sensitive <=0.5/9.5 mcg/mL BELEN Final    Tetracycline Sensitive <=4 mcg/mL BELEN Final                   Anaerobic Culture [[id number]] Collected:  02/05/20 1555    Order Status:  Completed Specimen:  Wound Updated:  02/08/20 1352     Significant Indicator NEG     Source WND     Site Right Great Toe Deep     Culture Result No Anaerobes isolated.    Narrative:       Surgery - swabs received    BLOOD CULTURE [[id number]]  (Abnormal) Collected:  02/05/20 1655    Order Status:  Completed Specimen:  Blood from Peripheral Updated:  02/07/20 1036     Significant Indicator POS     Source BLD     Site PERIPHERAL     Culture Result Growth detected by Bactec instrument. 02/06/2020  18:16      Staphylococcus aureus  See previous culture for sensitivity report.      Narrative:     "   CALL  [facility]  131 tel. [phone],  CALLED  131 tel. [phone] 02/06/2020, 18:20, RB PERF. RESULTS CALLED  TO:[id number]  Per Hospital Policy: Only change Specimen Src: to \"Line\" if  specified by physician order.  Left Forearm/Arm      FINAL DIAGNOSIS:    A. Right great toe:         Section specimen demonstrates benign skin and subcutaneous          tissue with area of ulceration, associated marked acute          inflammation, necrosis and fibrinopurulent debris.         Underlying bone demonstrates areas of acute inflammation          consistent with osteomyelitis.         Skin/soft tissue margin demonstrates areas of acute inflammation          and fibropurulent debris.         Bone resection margin demonstrates area of necrotic debris and          few scattered neutrophils focally extending to the inked          resection margin.  B. Right bone toe biopsy:         Small fragments of benign bone with intervening fibrofatty          marrow spaces demonstrating necrotic debris, few small          aggregates of neutrophils and reactive changes, overall          findings suspicious for osteomyelitis.       Assessment:  Active Hospital Problems    Diagnosis   • Cellulitis of toe, right [L03.031]   • Osteomyelitis of great toe of right foot ([facility]) [M86.9]   • DM (diabetes mellitus) ([facility]) [E11.9]   • Hyperlipidemia [E78.5]       Pertinent diagnoses:  Right great toe osteomyelitis, on treatment  MSSA sepsis, on treatment  Type 2 diabetes mellitus, HgA1c 6.9  Leukocytosis-resolved     Plan:  Continue IV cefazolin 2 g every 8 hours  Blood cultures on 2/3,  2/5 - MSSA  Repeat blood cultures on 2/7 - NGTD  TTE - negative. Recommend BRYN if feasible  s/p I&D down to bone, right great toe amputation with wound VAC placement on 2/5 with Dr. Brower.  Gross purulence noted per the operative note. Path +OM  s/p repeat I&D with wound closure on 2/7 by Dr. Randall  Anticipate a 6 weeks of IV antibiotics for MSSA " sepsis/osteomyelitis from date of 1st negative BCx  Keep BS under 150 to help control current infection    Anticipated stop date IV abx 3/20/2020    Prognosis for limb salvage guarded

## 2020-02-13 NOTE — CARE PLAN
Problem: Acute Care of the Diabetic Patient  Goal: Optimal Outcome for the Diabetic Patient  Outcome: PROGRESSING AS EXPECTED  Intervention: Monitor for signs and symptoms of hyperglycemia or hypoglycemia  Note: Checking BS as ordered.     Problem: Infection  Goal: Will remain free from infection  Outcome: PROGRESSING AS EXPECTED  Standard infection prevention measures in place. Ordered antibiotics given.

## 2020-02-14 VITALS
OXYGEN SATURATION: 96 % | DIASTOLIC BLOOD PRESSURE: 74 MMHG | RESPIRATION RATE: 15 BRPM | HEART RATE: 81 BPM | HEIGHT: 63 IN | WEIGHT: 167.77 LBS | TEMPERATURE: 97.7 F | SYSTOLIC BLOOD PRESSURE: 116 MMHG | BODY MASS INDEX: 29.73 KG/M2

## 2020-02-14 LAB
GLUCOSE BLD-MCNC: 111 MG/DL (ref 65–99)
GLUCOSE BLD-MCNC: 149 MG/DL (ref 65–99)

## 2020-02-14 PROCEDURE — 700111 HCHG RX REV CODE 636 W/ 250 OVERRIDE (IP): Performed by: INTERNAL MEDICINE

## 2020-02-14 PROCEDURE — A9270 NON-COVERED ITEM OR SERVICE: HCPCS | Performed by: STUDENT IN AN ORGANIZED HEALTH CARE EDUCATION/TRAINING PROGRAM

## 2020-02-14 PROCEDURE — 82962 GLUCOSE BLOOD TEST: CPT

## 2020-02-14 PROCEDURE — 700102 HCHG RX REV CODE 250 W/ 637 OVERRIDE(OP): Performed by: STUDENT IN AN ORGANIZED HEALTH CARE EDUCATION/TRAINING PROGRAM

## 2020-02-14 RX ORDER — INSULIN GLARGINE 100 [IU]/ML
15 INJECTION, SOLUTION SUBCUTANEOUS EVERY EVENING
Qty: 1 PEN | Refills: 3 | Status: SHIPPED | OUTPATIENT
Start: 2020-02-14

## 2020-02-14 RX ORDER — HYDROCODONE BITARTRATE AND ACETAMINOPHEN 10; 325 MG/1; MG/1
1 TABLET ORAL EVERY 6 HOURS PRN
Qty: 20 TAB | Refills: 0 | Status: SHIPPED | OUTPATIENT
Start: 2020-02-14 | End: 2020-02-24

## 2020-02-14 RX ORDER — LISINOPRIL 10 MG/1
10 TABLET ORAL DAILY
Qty: 30 TAB | Refills: 1 | Status: SHIPPED | OUTPATIENT
Start: 2020-02-15

## 2020-02-14 RX ORDER — HYDROCODONE BITARTRATE AND ACETAMINOPHEN 10; 325 MG/1; MG/1
1 TABLET ORAL EVERY 6 HOURS PRN
Qty: 20 TAB | Refills: 0 | Status: SHIPPED | OUTPATIENT
Start: 2020-02-14 | End: 2020-02-14

## 2020-02-14 RX ORDER — CEFAZOLIN SODIUM 2 G/100ML
2 INJECTION, SOLUTION INTRAVENOUS EVERY 8 HOURS
Qty: 3000 ML | Refills: 0 | Status: SHIPPED | OUTPATIENT
Start: 2020-02-14 | End: 2020-03-20

## 2020-02-14 RX ADMIN — LISINOPRIL 10 MG: 10 TABLET ORAL at 05:36

## 2020-02-14 RX ADMIN — ATORVASTATIN CALCIUM 40 MG: 40 TABLET, FILM COATED ORAL at 05:36

## 2020-02-14 RX ADMIN — CEFAZOLIN SODIUM 2 G: 2 INJECTION, SOLUTION INTRAVENOUS at 05:39

## 2020-02-14 RX ADMIN — CEFAZOLIN SODIUM 2 G: 2 INJECTION, SOLUTION INTRAVENOUS at 12:51

## 2020-02-14 RX ADMIN — MULTIPLE VITAMINS W/ MINERALS TAB 1 TABLET: TAB at 05:36

## 2020-02-14 RX ADMIN — METFORMIN HYDROCHLORIDE 1000 MG: 500 TABLET ORAL at 09:22

## 2020-02-14 NOTE — DISCHARGE PLANNING
Anticipated Discharge Disposition: [facility]    Action: Transport communication form faxed to [facility], transfer packet completed, placed in chart box, nurse Nereyda notified.    Barriers to Discharge: none     Plan: Transfer to [facility]

## 2020-02-14 NOTE — DISCHARGE INSTRUCTIONS
Discharge Instructions    Discharged to other by medical transportation with escort. Discharged via wheelchair, hospital escort: Yes.  Special equipment needed: Walker    Be sure to schedule a follow-up appointment with your primary care doctor or any specialists as instructed.     Discharge Plan:   Influenza Vaccine Indication: Not indicated: Previously immunized this influenza season and > 8 years of age    I understand that a diet low in cholesterol, fat, and sodium is recommended for good health. Unless I have been given specific instructions below for another diet, I accept this instruction as my diet prescription.   Other diet: regular     Special Instructions: None    · Is patient discharged on Warfarin / Coumadin?   No     Depression / Suicide Risk    As you are discharged from this [facility], it is important to learn how to keep safe from harming yourself.    Recognize the warning signs:  · Abrupt changes in personality, positive or negative- including increase in energy   · Giving away possessions  · Change in eating patterns- significant weight changes-  positive or negative  · Change in sleeping patterns- unable to sleep or sleeping all the time   · Unwillingness or inability to communicate  · Depression  · Unusual sadness, discouragement and loneliness  · Talk of wanting to die  · Neglect of personal appearance   · Rebelliousness- reckless behavior  · Withdrawal from people/activities they love  · Confusion- inability to concentrate     If you or a loved one observes any of these behaviors or has concerns about self-harm, here's what you can do:  · Talk about it- your feelings and reasons for harming yourself  · Remove any means that you might use to hurt yourself (examples: pills, rope, extension cords, firearm)  · Get professional help from the community (Mental Health, Substance Abuse, psychological counseling)  · Do not be alone:Call your Safe Contact- someone whom you trust who will be  there for you.  · Call your local CRISIS HOTLINE 672-8015 or 907-736-8094  · Call your local Children's Mobile Crisis Response Team [place] (946) 611-5091 or www.[employer]  · Call the toll free National Suicide Prevention Hotlines   · National Suicide Prevention Lifeline 999-660-GGLR (1316)  · National [employer] 800-SUICIDE (267-1049)

## 2020-02-14 NOTE — DISCHARGE PLANNING
Agency/Facility Name: [facility]  Spoke To: Eleni  Outcome: Pt accepted. Auth obtained. Facility will transport pt  2/14 @ 1500.    RN CM informed

## 2020-02-14 NOTE — CARE PLAN
Problem: Safety  Goal: Will remain free from falls  Outcome: PROGRESSING AS EXPECTED   Fall precautions in place. Patient encouraged to use call light to alert staff of needs and before getting out of bed. Patient verbalized understanding. Call light and personal belongings within reach. Hourly rounding in place.     Problem: Pain Management  Goal: Pain level will decrease to patient's comfort goal  Outcome: PROGRESSING AS EXPECTED   PRN pain medication given. Patient rates pain using 0-10 pain rating scale. Patient resting in bed comfortably after receiving PRN pain medication.

## 2020-02-14 NOTE — CARE PLAN
Problem: Communication  Goal: The ability to communicate needs accurately and effectively will improve  Outcome: PROGRESSING AS EXPECTED     Problem: Safety  Goal: Will remain free from injury  Outcome: PROGRESSING AS EXPECTED     Problem: Infection  Goal: Will remain free from infection  Outcome: PROGRESSING AS EXPECTED     Problem: Venous Thromboembolism (VTW)/Deep Vein Thrombosis (DVT) Prevention:  Goal: Patient will participate in Venous Thrombosis (VTE)/Deep Vein Thrombosis (DVT)Prevention Measures  Outcome: PROGRESSING AS EXPECTED

## 2020-02-14 NOTE — PROGRESS NOTES
[facility] FAMILY MEDICINE PROGRESS NOTE     Attending:   Hand    Resident:   Sanjay Swanson M.D.    PATIENT:   Rob Webster; [id number]; 1965    ID:   54 y.o. male admitted for R great toe osteomyelitis and MSSA bacteremia    SUBJECTIVE:   No acute events overnight, pt doing well. Ambulating in walking boot, pain well controlled including with dressing changes. No N/V/D, no polyuria.     OBJECTIVE:  Vitals:    02/13/20 1600 02/13/20 2058 02/14/20 0500 02/14/20 0800   BP: 111/71 117/76 115/76 116/74   Pulse: 95 83 83 81   Resp: 17 16 17 15   Temp: 36.4 °C (97.6 °F) 36.2 °C (97.1 °F) 36 °C (96.8 °F) 36.5 °C (97.7 °F)   TempSrc: Temporal Temporal Temporal Temporal   SpO2: 98% 96% 94% 96%   Weight:       Height:           Intake/Output Summary (Last 24 hours) at 2/14/2020 0937  Last data filed at 2/14/2020 0500  Gross per 24 hour   Intake 240 ml   Output 400 ml   Net -160 ml       PHYSICAL EXAM:  General: No acute distress, afebrile, resting comfortably  HEENT: NC/AT. EOMI. MMM, neck supple without adenopathy  Cardiovascular: RRR, normal S1/S2 no murmurs rubs, or gallops, cap refill brisk.  Respiratory: CTAB, no tachypnea or retractions  Abdomen: soft, NT/ND, no masses  EXT:  No rashes or skin changes noted. Pulses 2+ DP and radial bilaterally. RLE in clean dry dressing  Neuro: Non-focal    LABS:    Recent Labs     02/13/20  1113 02/13/20  1658 02/14/20  0838   POCGLUCOSE 199* 117* 149*       IMAGING:  None new    MEDS:  Current Facility-Administered Medications   Medication Last Dose   • metFORMIN (GLUCOPHAGE) tablet 1,000 mg 1,000 mg at 02/14/20 0922   • insulin regular (HUMULIN R) injection 5 Units 5 Units at 02/14/20 0917    And   • glucose 4 g chewable tablet 16 g      And   • DEXTROSE 10% BOLUS 250 mL     • insulin regular (HUMULIN R) injection 1-4 Units Stopped at 02/11/20 1742   • insulin glargine (LANTUS) injection 15 Units 15 Units at 02/13/20 1756   • lisinopril (PRINIVIL) 10 MG tablet 10 mg 10 mg at  02/14/20 0536   • morphine (pf) 4 MG/ML injection 2 mg     • ceFAZolin in dextrose (ANCEF) IVPB premix 2 g Stopped at 02/14/20 0609   • senna-docusate (PERICOLACE or SENOKOT S) 8.6-50 MG per tablet 2 Tab 2 Tab at 02/11/20 0425    And   • polyethylene glycol/lytes (MIRALAX) PACKET 1 Packet 1 Packet at 02/04/20 2038    And   • magnesium hydroxide (MILK OF MAGNESIA) suspension 30 mL      And   • bisacodyl (DULCOLAX) suppository 10 mg     • acetaminophen (TYLENOL) tablet 650 mg 650 mg at 02/12/20 2157   • atorvastatin (LIPITOR) tablet 40 mg 40 mg at 02/14/20 0536   • therapeutic multivitamin-minerals (THERAGRAN-M) tablet 1 Tab 1 Tab at 02/14/20 0536   • HYDROcodone/acetaminophen (NORCO)  MG per tablet 1 Tab 1 Tab at 02/13/20 1948       ASSESSMENT/PLAN:  # Osteomyelitis  -Continue IV ancef for 6 weeks total per ID recs from 2/7 as negative culture date (stop 03/20)  -Dressing changes daily  -Monitor for fevers, repeat CBC and cultures if spikes fever or other signs of infection return      # Ray amputation R great toe  -Dressing changes daily  -Heal weight bearing while in boot per ortho  -Repeat PT eval states does not need SNF for rehab purposes  -PRN pain medications, well controlled at this point     # MSSA bacteremia  -Staph pan sensitive  -Culture drawn 2/7 now negative x72 hours  -PICC line in place  -Likely discharge to SNF or home with home health and infusion     #DM type II  -Last A1C 6.9 in January of 2020  -Suspect elevated sugars in hospital due to stress and infection reaction however many in 200s in last 24 hours  -Metformin increased to 1000mg BID yesterday  -Lantus 15 units every evening  -Regular insulin 5 units before meals with sliding scale on top  -Ideally will not need insulin long term with well controlled status prior to admission however post-op important to have tight control to promote wound healing  -Continue lisinopril for renal protection     #HLD  -Continue statin      Disposition:  Medically cleared for discharge     #Core Measures   VTE PPx:SCDs  Abx:Ancef IV  Lines/Tubes:PICC  Fluids:None  Diet: Diabetic  Code Status: Full

## 2020-02-21 NOTE — DOCUMENTATION QUERY
[facility]                                                                       Query Response Note      PATIENT:               NABILA URIAS  ACCT #:                  [id number]  MRN:                     [id number]  :                      1965  ADMIT DATE:       2/3/2020 5:34 PM  DISCH DATE:        2020 3:54 PM  RESPONDING  PROVIDER #:        [id number]           QUERY TEXT:    Debridement is documented in the Medical Record. Please specify the type.      NOTE:  If an appropriate response is not listed below, please respond with a new note.        The patient's Clinical Indicators include:   operative report   the wound was inspected and was sharply debrided with the use of scissors, knife, and bluntly with a curette, all devitalized tissue throughout the remainder of the wound bed.  Options provided:   -- Non-excisional debridement   -- Excisional debridement, Please specify the following details- Deepest level of debridement treated, instrument used, nature of tissue, appearance/size of wound, and technique   -- Unable to determine      Query created by: Dwaine Meraz on 2020 5:55 AM    RESPONSE TEXT:    excisional debridement- To th level of tendon and bone. Instruments used as above are scissors, knife, curette, and rongeur.          Electronically signed by:  SHOSHANA PATRICIA MD 2020 2:02 PM

## 2020-02-28 ENCOUNTER — OFFICE VISIT (OUTPATIENT)
Dept: INFECTIOUS DISEASES | Facility: MEDICAL CENTER | Age: 55
End: 2020-02-28
Payer: COMMERCIAL

## 2020-02-28 ENCOUNTER — OFFICE VISIT (OUTPATIENT)
Dept: WOUND CARE | Facility: MEDICAL CENTER | Age: 55
End: 2020-02-28
Attending: NURSE PRACTITIONER
Payer: COMMERCIAL

## 2020-02-28 VITALS
TEMPERATURE: 98.2 F | HEART RATE: 80 BPM | BODY MASS INDEX: 29.59 KG/M2 | OXYGEN SATURATION: 99 % | SYSTOLIC BLOOD PRESSURE: 130 MMHG | DIASTOLIC BLOOD PRESSURE: 80 MMHG | WEIGHT: 167 LBS | HEIGHT: 63 IN

## 2020-02-28 DIAGNOSIS — A49.01 MSSA (METHICILLIN SUSCEPTIBLE STAPHYLOCOCCUS AUREUS) INFECTION: ICD-10-CM

## 2020-02-28 DIAGNOSIS — E11.52 TYPE 2 DIABETES MELLITUS WITH DIABETIC PERIPHERAL ANGIOPATHY AND GANGRENE, WITHOUT LONG-TERM CURRENT USE OF INSULIN (HCC): ICD-10-CM

## 2020-02-28 DIAGNOSIS — B95.61 MSSA BACTEREMIA: ICD-10-CM

## 2020-02-28 DIAGNOSIS — R78.81 MSSA BACTEREMIA: ICD-10-CM

## 2020-02-28 DIAGNOSIS — M86.271 SUBACUTE OSTEOMYELITIS OF RIGHT FOOT (HCC): ICD-10-CM

## 2020-02-28 DIAGNOSIS — Z89.411 HISTORY OF PARTIAL RAY AMPUTATION OF RIGHT GREAT TOE (HCC): ICD-10-CM

## 2020-02-28 PROBLEM — M86.9 OSTEOMYELITIS OF GREAT TOE OF RIGHT FOOT (HCC): Status: RESOLVED | Noted: 2020-02-04 | Resolved: 2020-02-28

## 2020-02-28 PROBLEM — L03.031 CELLULITIS OF TOE, RIGHT: Status: RESOLVED | Noted: 2020-02-03 | Resolved: 2020-02-28

## 2020-02-28 PROCEDURE — 99213 OFFICE O/P EST LOW 20 MIN: CPT | Performed by: NURSE PRACTITIONER

## 2020-02-28 PROCEDURE — 99213 OFFICE O/P EST LOW 20 MIN: CPT

## 2020-02-28 PROCEDURE — 99214 OFFICE O/P EST MOD 30 MIN: CPT | Performed by: NURSE PRACTITIONER

## 2020-02-28 ASSESSMENT — ENCOUNTER SYMPTOMS
WEAKNESS: 0
FEVER: 0
HEADACHES: 0
ABDOMINAL PAIN: 0
DIARRHEA: 0
CONSTIPATION: 0
NERVOUS/ANXIOUS: 0
CHILLS: 0
MYALGIAS: 1
VOMITING: 0
SORE THROAT: 0
SENSORY CHANGE: 1
NAUSEA: 0
SHORTNESS OF BREATH: 0

## 2020-02-28 NOTE — PROGRESS NOTES
LIMB PRESERVATION SERVICE ROUNDS    Patient seen in collaboration with interdisciplinary team during LPS rounds in wound clinic for evaluation of right first ray amputation site.     He is a 54 year old male with a past medical history of diabetes mellitus, obesity, and hyperlipidemia who was admitted 2/3/2020-2/14/2020 for a right great toe abscess. He reported than on 1[place]17[place]2020, [place] and his right great toe began to swell. The toe increasingly painful and swollen unrelieved by tylenol and Arnica gel and sought care in the emergency department 1/20/2020 and was sent home with analgesics. He returned to the emergency department on 2/3/2020 with worsening erythema, pain, and edema to his foot. Xray was positive for osteomyelitis. The patient underwent right foot irrigation and debridement, right 1st ray amputation to the metatarsal shaft, and wound vac placement with Dr. Randall on 2/5/2020 and a subsequent irrigation and debridement with closure 2/7/2020. Cultures on 2/5/2020 were positive for MSSA and pathology was suspicious for OM. He was seen by infectious disease and started on 6 week course of Ancef (estimated end date 3/20/2020) and discharged to SNF.    Patient is a known diabetic with neuropathy. He manages his diabetes with metformin and insulin, states blood sugar ranges 100-150.    Today he presents to LPS rounds from [facility]. He reports that other than having occasional sharp pain to his right great toe amputation site that is relieved with tylenol, he feels well. He denies fevers, chills, nausea, vomiting, chest pain, shortness of breath. Further denies any odor, drainage, or swelling from amputation site.       RESULTS:    Lab Results   Component Value Date/Time    HBA1C 6.9 (H) 01/21/2020 09:37 AM            OBJECTIVE FINDINGS:     Pulses: 2+ bilateral DP/PT pulses.     Wound:   Right first ray amputation site: sutures in place. Scabbing and scant serosanguinous drainage from  distal end of incision. No erythema, swelling or maceration noted.       PROCEDURE   Sutures removed today. Dry gauze dressing applied.   Wound care completed by Bee Parks RN, CWS    PLAN:   Wound Care: Dry gauze dressing changes to right 1st ray site. Follow up at [facility].  Imaging/Labs: no further imaging/labs at this time  Offloading: patient has an offloading boot to right foot. Prescription for diabetic shoes and inserts through  (due to insurance) provided.   Antibiotics: Continue course of Ancef end date 3/20/2020, ID following. Next appt 3/20/2020 with ID clinic  Surgery: none planned at this time  Referral: referral to [facility] placed.     LPS Follow up: as needed for any future needs.     Discussed with Dr. Randall, patient, and Hedy MORENO with Infectious Disease.     Patient was seen for 15 minutes face to face of which > 50% of appointment time was spent on counseling and coordination of care regarding the above.      Please note that this dictation was created using voice recognition software. I have  worked with technical experts from [employer] to optimize the interface.  I have made every reasonable attempt to correct obvious errors, but there may be errors of grammar and possibly content that I did not discover before finalizing the note.

## 2020-02-28 NOTE — PROGRESS NOTES
Infectious Disease Clinic    Subjective:     Chief Complaint   Patient presents with   • Hospital Follow-up     Right foot osteomyelitis     This is my first time meeting Mr. Rmoe Webster.      Interval History: 54-year-old  man with a history of type 2 diabetes mellitus.  Hospitalized from 2/3/2020 - 2/14/2020, admitted for worsening right great toe swelling, erythema and pain.  Patient was running on 01/17/2020 when he sustained a ground-level fall.  Three days later, he noted increasing pain, swelling, erythema and a open wound.  He initially presented to the emergency department at that time where an x-ray did not show any evidence of fracture.  He was discharged home on pain medication.  Despite his pain medications, he continued to have persistent right foot pain with worsening erythema, swelling and purulent drainage. On presentation, he was afebrile with a mild leukocytosis of 13.2.  ESR elevated at 91.  X-ray revealed periarticular erosion of the great toe proximal phalanx concerning for osteomyelitis.  MRI showed first metatarsal and proximal phalanx osteomyelitis with possible necrosis.  1 out of 2 blood cultures on 2/3 & 2/5 were +MSSA.  TTE negative on 2/6.  Repeat Bcx on 2/7 were negative.  Underwent Right foot I&D and Right first ray amputation to the metatarsal shaft on 2/5 with Dr. Randall.  OR cx on 2/5 +MSSA.  Underwent repeat Right foot I&D with closure on 2/7 with Dr. Randall.  Discharged to SNF on IV Cefazolin x 6 weeks, estimated end date  3/20/20.      Hospital records reviewed    Today, 2/28/2020: Currently residing at [facility].  Patient reports feeling well and has been tolerating the IV cefazolin without adverse effect.  Denies feeling generally ill, fevers/chills, general malaise, headache, n/v/d, abdominal pain, chest pain or shortness of breath.  Denies any drainage from the right great toe amp site.  Notes that he has occasional 3/10 right great toe amp site pain  "that is typically quick and sharp in nature, improves with rest and pain medication.  Blood sugar in the 100-150s.    Review of Systems   Constitutional: Negative for chills, fever and malaise/fatigue.   HENT: Negative for congestion and sore throat.    Respiratory: Negative for shortness of breath.    Cardiovascular: Negative for chest pain and leg swelling.   Gastrointestinal: Negative for abdominal pain, constipation, diarrhea, nausea and vomiting.   Musculoskeletal: Positive for joint pain and myalgias.   Skin: Negative for rash.   Neurological: Positive for sensory change. Negative for weakness and headaches.   Psychiatric/Behavioral: The patient is not nervous/anxious.        Past Medical History:   Diagnosis Date   • Glaucoma 5/23/2012       Family History   Problem Relation Age of Onset   • Cancer Neg Hx    • Diabetes Neg Hx    • Heart Disease Neg Hx    • Hypertension Neg Hx        Social History     Tobacco Use   • Smoking status: Never Smoker   • Smokeless tobacco: Never Used   Substance Use Topics   • Alcohol use: Not Currently     Comment: no ETOH x17 years   • Drug use: Never       Allergies: Patient has no known allergies.    Pt's medication and problem list reviewed.     Objective:     /80 (BP Location: Left arm, Patient Position: Sitting, BP Cuff Size: Adult)   Pulse 80   Temp 36.8 °C (98.2 °F) (Temporal)   Ht 1.6 m (5' 3\")   Wt 75.8 kg (167 lb)   SpO2 99%   BMI 29.58 kg/m²     Physical Exam   Constitutional: He is oriented to person, place, and time and well-developed, well-nourished, and in no distress. No distress.   HENT:   Head: Normocephalic and atraumatic.   Mouth/Throat: Oropharynx is clear and moist. No oropharyngeal exudate.   Eyes: Pupils are equal, round, and reactive to light. Conjunctivae and EOM are normal. No scleral icterus.   Neck: Normal range of motion. Neck supple. No tracheal deviation present.   Cardiovascular: Normal rate, regular rhythm and intact distal pulses. "   Pulmonary/Chest: Effort normal. No respiratory distress. He has no wheezes.   Abdominal: Soft. Bowel sounds are normal. He exhibits no distension. There is no abdominal tenderness.   Musculoskeletal:         General: Deformity and edema (Trace RLE) present.      Comments: RUE PICC- CDI, non tender, no erythema.    Right great toe amp site- sutures in place, no maceration, no erythema to surrounding tissue, scant ss drainage, no odor, non tender to palpation.    Limited RLE ROM due to walking boot.   Lymphadenopathy:     He has no cervical adenopathy.   Neurological: He is alert and oriented to person, place, and time. No cranial nerve deficit.   Shuffling gait, FWW used for ambulatory assistance.    Decreased sensation to RLE.   Skin: Skin is warm and dry. No rash noted. He is not diaphoretic. No erythema.   Psychiatric: Mood, memory, affect and judgment normal.   Pleasant   Vitals reviewed.    Assessment and Plan:   The following treatment plan was discussed with patient at length:    1. MSSA bacteremia      -Continue IV Cefazolin through 3/20/20.  -Weekly CBC & CMP.  -ESR and CRP every other week.  -Send results to ID for review.   2. Subacute osteomyelitis of right foot (HCC)      -Abx as above.  -FU with Dr. Randall and wound care as directed.   3. MSSA (methicillin susceptible Staphylococcus aureus) infection      As above   4. Type 2 diabetes mellitus with diabetic peripheral angiopathy and gangrene, without long-term current use of insulin (HCC)      Keep BS under 150 to promote wound healing and control infection.     Follow up: 3 weeks, RTC sooner if needed. FU with PCP for ongoing chronic medical conditions.     TIFFANY Alba.    Case discussed with Dr. Randall from [facility] and JOSH Fierro with [facility].       Please note that this dictation was created using voice recognition software. I have  worked with technical experts from [employer] to optimize the interface.  I have made every  reasonable attempt to correct obvious errors, but there may be errors of grammar and possibly content that I did not discover before finalizing the note.

## 2020-03-16 ENCOUNTER — OFFICE VISIT (OUTPATIENT)
Dept: WOUND CARE | Facility: MEDICAL CENTER | Age: 55
End: 2020-03-16
Attending: NURSE PRACTITIONER
Payer: COMMERCIAL

## 2020-03-16 VITALS
OXYGEN SATURATION: 99 % | DIASTOLIC BLOOD PRESSURE: 85 MMHG | RESPIRATION RATE: 16 BRPM | HEART RATE: 96 BPM | TEMPERATURE: 99 F | SYSTOLIC BLOOD PRESSURE: 134 MMHG

## 2020-03-16 DIAGNOSIS — L08.9 WOUND INFECTION: ICD-10-CM

## 2020-03-16 DIAGNOSIS — E11.69 TYPE 2 DIABETES MELLITUS WITH OTHER SPECIFIED COMPLICATION, WITHOUT LONG-TERM CURRENT USE OF INSULIN (HCC): ICD-10-CM

## 2020-03-16 DIAGNOSIS — T14.8XXA WOUND INFECTION: ICD-10-CM

## 2020-03-16 DIAGNOSIS — Z89.411 HISTORY OF AMPUTATION OF RIGHT GREAT TOE (HCC): Primary | ICD-10-CM

## 2020-03-16 PROCEDURE — 99213 OFFICE O/P EST LOW 20 MIN: CPT | Mod: 25 | Performed by: NURSE PRACTITIONER

## 2020-03-16 PROCEDURE — 11042 DBRDMT SUBQ TIS 1ST 20SQCM/<: CPT | Performed by: NURSE PRACTITIONER

## 2020-03-16 NOTE — LETTER
2020        To: [facility]  Regarding: oRb Webster,  1965    Right great toe amputation incision site wound care:    Pt to be seen at wound clinic weekly.  Please change dressing 1x/week and prn wet/soiled/falling off.    Remove prior dressing.  Cleanse area with saline, wound cleanser, or gentle soap and water.  Apply skin prep to periwound.  Apply zinc cream/moisture barrier to immediate periwound.  Honey alginate to open wound bed.  Cover with nonadhesive foam.  Secure with fabric tape such as hypafix.    Thank you.                JOSH Marques

## 2020-03-20 ENCOUNTER — OFFICE VISIT (OUTPATIENT)
Dept: INFECTIOUS DISEASES | Facility: MEDICAL CENTER | Age: 55
End: 2020-03-20
Payer: COMMERCIAL

## 2020-03-20 VITALS
HEIGHT: 63 IN | BODY MASS INDEX: 29.77 KG/M2 | DIASTOLIC BLOOD PRESSURE: 80 MMHG | SYSTOLIC BLOOD PRESSURE: 134 MMHG | WEIGHT: 168 LBS | TEMPERATURE: 98.2 F | OXYGEN SATURATION: 97 % | HEART RATE: 82 BPM

## 2020-03-20 DIAGNOSIS — B95.61 MSSA BACTEREMIA: ICD-10-CM

## 2020-03-20 DIAGNOSIS — A49.01 MSSA (METHICILLIN SUSCEPTIBLE STAPHYLOCOCCUS AUREUS) INFECTION: ICD-10-CM

## 2020-03-20 DIAGNOSIS — R78.81 MSSA BACTEREMIA: ICD-10-CM

## 2020-03-20 DIAGNOSIS — E11.52 TYPE 2 DIABETES MELLITUS WITH DIABETIC PERIPHERAL ANGIOPATHY AND GANGRENE, WITHOUT LONG-TERM CURRENT USE OF INSULIN (HCC): ICD-10-CM

## 2020-03-20 DIAGNOSIS — M86.271 SUBACUTE OSTEOMYELITIS OF RIGHT FOOT (HCC): ICD-10-CM

## 2020-03-20 PROCEDURE — 99213 OFFICE O/P EST LOW 20 MIN: CPT | Performed by: NURSE PRACTITIONER

## 2020-03-20 ASSESSMENT — ENCOUNTER SYMPTOMS
FEVER: 0
CONSTIPATION: 0
HEADACHES: 0
TINGLING: 1
NAUSEA: 0
ABDOMINAL PAIN: 0
VOMITING: 0
SORE THROAT: 0
DIARRHEA: 0
WEAKNESS: 0
NERVOUS/ANXIOUS: 0
MYALGIAS: 1
DIAPHORESIS: 0
SENSORY CHANGE: 1
SHORTNESS OF BREATH: 0
CHILLS: 0

## 2020-03-20 ASSESSMENT — FIBROSIS 4 INDEX
FIB4 SCORE: 0.27
FIB4 SCORE: 0.27

## 2020-03-20 NOTE — PROGRESS NOTES
Infectious Disease Clinic    Subjective:     Chief Complaint   Patient presents with   • Follow-Up     MSSA bacteremia & Subacute osteomyelitis of right foot     Interval History: 54-year-old  man with a history of type 2 diabetes mellitus.  Hospitalized from 2/3/2020 - 2/14/2020, admitted for worsening right great toe swelling, erythema and pain.  Patient was running on 01/17/2020 when he sustained a ground-level fall.  Three days later, he noted increasing pain, swelling, erythema and a open wound.  He initially presented to the emergency department at that time where an x-ray did not show any evidence of fracture.  He was discharged home on pain medication.  Despite his pain medications, he continued to have persistent right foot pain with worsening erythema, swelling and purulent drainage. On presentation, he was afebrile with a mild leukocytosis of 13.2.  ESR elevated at 91.  X-ray revealed periarticular erosion of the great toe proximal phalanx concerning for osteomyelitis.  MRI showed first metatarsal and proximal phalanx osteomyelitis with possible necrosis.  1 out of 2 blood cultures on 2/3 & 2/5 were +MSSA.  TTE negative on 2/6.  Repeat Bcx on 2/7 were negative.  Underwent Right foot I&D and Right first ray amputation to the metatarsal shaft on 2/5 with Dr. Randall.  OR cx on 2/5 +MSSA.  Pathology on 2/5 suspicious for OM.  Underwent repeat Right foot I&D with closure on 2/7 with Dr. Randall.  Discharged to [facility] on IV Cefazolin x 6 weeks, estimated end date  3/20/20.      2/28/2020: Seen by JOSH Narayan.  Residing at [facility].  Denies any drainage from the right great toe amp site.  Continue IV Cefazolin through 3/20/20.    Today, 3/20/2020: Continues to reside at [facility]; however, he is planning to discharge tomorrow.  Continues to tolerate the IV cefazolin without issue. Denies feeling generally ill, fevers/chills, general malaise, headache, n/v/d, abdominal pain, chest  pain or shortness of breath.  Notes that he was seen by Dr. Randall this past Tuesday, he is happy with the progress and next follow-up was on May 19.  He continues to wear a walking boot, but was seen by ability and is having diabetic shoes made.  States there has not been any drainage from the right great toe amp site for the last couple of weeks, he feels that it is doing well overall.  He notes that he has a little bit of pain that comes and goes, 3/10, typically tingling sharp sensation, improved with rest.  He also notes that he has neuropathy to BLE.  Blood sugars in the 100-130s.  He is set up to be seen at the wound care clinic on a weekly basis.    Review of Systems   Constitutional: Negative for chills, diaphoresis, fever and malaise/fatigue.   HENT: Negative for congestion and sore throat.    Respiratory: Negative for shortness of breath.    Cardiovascular: Negative for chest pain and leg swelling.   Gastrointestinal: Negative for abdominal pain, constipation, diarrhea, nausea and vomiting.   Genitourinary: Negative for dysuria.   Musculoskeletal: Positive for myalgias. Negative for joint pain.   Skin: Negative for rash.   Neurological: Positive for tingling and sensory change. Negative for weakness and headaches.   Psychiatric/Behavioral: The patient is not nervous/anxious.        Past Medical History:   Diagnosis Date   • Glaucoma 5/23/2012       Family History   Problem Relation Age of Onset   • Cancer Neg Hx    • Diabetes Neg Hx    • Heart Disease Neg Hx    • Hypertension Neg Hx        Social History     Tobacco Use   • Smoking status: Never Smoker   • Smokeless tobacco: Never Used   Substance Use Topics   • Alcohol use: Not Currently     Comment: no ETOH x17 years   • Drug use: Never       Allergies: Patient has no known allergies.    Pt's medication and problem list reviewed.     Objective:     /80 (BP Location: Left arm, Patient Position: Sitting, BP Cuff Size: Adult)   Pulse 82   Temp  "36.8 °C (98.2 °F) (Temporal)   Ht 1.6 m (5' 3\")   Wt 76.2 kg (168 lb)   SpO2 97%   BMI 29.76 kg/m²     Physical Exam   Constitutional: He is oriented to person, place, and time and well-developed, well-nourished, and in no distress. No distress.   HENT:   Head: Normocephalic.   Mouth/Throat: Oropharynx is clear and moist. No oropharyngeal exudate.   Eyes: Pupils are equal, round, and reactive to light. EOM are normal. No scleral icterus.   Neck: Normal range of motion. Neck supple. No tracheal deviation present.   Cardiovascular: Normal rate, regular rhythm and normal heart sounds.   No murmur heard.  Pulmonary/Chest: Effort normal and breath sounds normal. No respiratory distress. He has no wheezes. He has no rales.   Abdominal: Soft. Bowel sounds are normal. He exhibits no distension. There is no abdominal tenderness. There is no rebound and no guarding.   Musculoskeletal:         General: Deformity present.      Comments: RUE PICC- CDI, non tender, no erythema.    Right hallux amp site-dressing in place, pictures from wound care on 3/16 reviewed.  1 x 0.8 x 0.1 cm to distal portion of incision, wound bed pink/yellow, remainder of surgical incision well-healed and approximated, no maceration, no erythema to surrounding tissue.    Limited RLE ROM due to walking boot.   Neurological: He is alert and oriented to person, place, and time. No cranial nerve deficit.   Shuffling gait, FWW used for ambulatory assistance.   Skin: Skin is warm and dry. No rash noted. He is not diaphoretic. No erythema.   Psychiatric: Mood, memory, affect and judgment normal.   Pleasant   Vitals reviewed.    Labs on 3/16 from [facility]:  WBC 4.9  Hemoglobin 13.5  Hematocrit 41.1  Platelet 205  Glucose 122  BUN 7  Creatinine 0.7  Sodium 138  Potassium 3.8  AST 18.9  ALT 13.7  Alk phos 72.3  CRP <5.0  ESR 20    Assessment and Plan:   The following treatment plan was discussed with patient at length:    1. MSSA " bacteremia      Finish IV cefazolin today, 3/20/20.   2. Subacute osteomyelitis of right foot (HCC)  CBC WITH DIFFERENTIAL    Comp Metabolic Panel    Sed Rate    -Start p.o. Bactrim DS twice daily x30 days on 3/21, will plan to treat anywhere from 1 to 3 months, depending on rate of healing and lab results.  -CBC, CMP an and ESR to be done on 3/31, 10 days after starting p.o. Bactrim.  Monitoring for leukocytosis, thrombocytopenia, hepato-nephrotoxicity, hyperkalemia and trending inflammatory markers.  -Continue with wound care as directed.  -Follow-up with Dr. Randall as directed.   3. MSSA (methicillin susceptible Staphylococcus aureus) infection  CBC WITH DIFFERENTIAL    Comp Metabolic Panel    Sed Rate    As above   4. Type 2 diabetes mellitus with diabetic peripheral angiopathy and gangrene, without long-term current use of insulin (HCC)      Keep blood sugar under 150 to promote wound healing and control infection.     Follow up: 2 weeks, RTC sooner if needed. FU with PCP for ongoing chronic medical conditions.     CHANTAL [email or web address].       Please note that this dictation was created using voice recognition software. I have  worked with technical experts from [facility] to optimize the interface.  I have made every reasonable attempt to correct obvious errors, but there may be errors of grammar and possibly content that I did not discover before finalizing the note.

## 2020-03-22 ASSESSMENT — ENCOUNTER SYMPTOMS
PALPITATIONS: 0
SPUTUM PRODUCTION: 0
NAUSEA: 0
VOMITING: 0
MYALGIAS: 0
CHILLS: 0
WEAKNESS: 0
SHORTNESS OF BREATH: 0
DIZZINESS: 0
WHEEZING: 0
COUGH: 0
FEVER: 0

## 2020-03-22 NOTE — PROGRESS NOTES
Provider Encounter- Full Thickness wound    HISTORY OF PRESENT ILLNESS  Wound History:    START OF CARE IN CLINIC: 3/16/2020    REFERRING PROVIDER: JOSH Duenas     WOUND- Full Thickness Wound   LOCATION: Right first ray   HISTORY: Patient reports that he was jogging on 01/17/2020 when he sustained a fall.  3 days later he noticed increasing pain, erythema, swelling and development of an open wound.  He presented to the emergency department where an x-ray was performed which showed no evidence of fracture.  Patient was discharged home with some pain medication.  Patient reports that even though he had pain medication the pain continued with worsening erythema, swelling and new purulent drainage.  He subsequently represented to the ED again on 2/3/2020 with worsening right foot pain.  During this admission he was found to have osteomyelitis of the distal first metatarsal and proximal phalanx with abscess/extension of soft tissue involvement. At that time he was determined to be septic with bacteremia, cultures taken were positive for MSSA.  On 02/05/2020 patient underwent amputation and I&D by Dr. Randall.  A PICC line was placed and per IDs recommendations patient was to continue on IV cefazolin for 6 weeks.  Patient was transferred to a SNF for further treatment of the wound and IV antibiotic administration.  The patient was referred to [facility] for wound management.    Pertinent Medical History: Obesity, hyperlipidemia, diabetes mellitus, retinal detachment    TOBACCO USE: Never smoked or use smokeless tobacco    Patient's problem list, allergies, and current medications reviewed and updated in Epic    Interval History:  3/22/2020: Clinic visit with JOSH Marques. Patient states that they are feeling well today.  Patient denies fever, chills, nausea, vomiting, lightheadedness, dizziness, shortness of breath and chest pain.  Patient presents with small scabbed over wound bed.  Scab was removed to  reveal some fibrous tissue underneath.  Overall the wound is healthy and healing appropriately.      REVIEW OF SYSTEMS:   Review of Systems   Constitutional: Negative for chills and fever.   Respiratory: Negative for cough, sputum production, shortness of breath and wheezing.    Cardiovascular: Negative for chest pain, palpitations and leg swelling.   Gastrointestinal: Negative for nausea and vomiting.   Musculoskeletal: Negative for myalgias.   Skin: Negative for itching and rash.   Neurological: Negative for dizziness and weakness.       PHYSICAL EXAMINATION:   /85   Pulse 96   Temp 37.2 °C (99 °F)   Resp 16   SpO2 99%     Physical Exam   Constitutional: He is oriented to person, place, and time and well-developed, well-nourished, and in no distress.   HENT:   Head: Normocephalic and atraumatic.   Eyes: Pupils are equal, round, and reactive to light.   Neck: Normal range of motion.   Cardiovascular: Intact distal pulses.   Pulmonary/Chest: Effort normal. No respiratory distress. He has no wheezes.   Musculoskeletal: Normal range of motion.   Neurological: He is alert and oriented to person, place, and time.   Skin: Skin is warm. No rash noted. No erythema.   Right first ray amputation  See doc flowsheets   Psychiatric: Mood, memory, affect and judgment normal.       WOUND ASSESSMENT    Wound 03/16/20 Diabetic Ulcer Right great toe amputation site (Active)   Wound Image     3/16/2020 10:00 AM   Site Assessment Other (Comment) 3/16/2020 10:00 AM   Periwound Assessment Intact;Edema;Other (Comment) 3/16/2020 10:00 AM   Margins Attached edges 3/16/2020 10:00 AM   Closure Secondary intention 3/16/2020 10:00 AM   Drainage Amount None 3/16/2020 10:00 AM   Treatments Cleansed 3/16/2020 10:00 AM   Wound Cleansing Foam Cleanser/Washcloth 3/16/2020 10:00 AM   Periwound Protectant Skin Protectant Wipes to Periwound;Moisture Barrier 3/16/2020 10:00 AM   Dressing Cleansing/Solutions Normal Saline 3/16/2020 10:00 AM    Dressing Options Honey Alginate;Tubigrip;Other (Comments);Nonadhesive Foam;Hypafix Tape 3/16/2020 10:00 AM   Dressing Changed New 3/16/2020 10:00 AM   Dressing Status Clean;Dry;Intact 3/16/2020 10:00 AM   Dressing Change/Treatment Frequency Every 72 hrs, and As Needed 3/16/2020 10:00 AM   Post-Procedure Length (cm) 1 cm 3/16/2020 10:00 AM   Post-Procedure Width (cm) 0.8 cm 3/16/2020 10:00 AM   Post-Procedure Surface Area (cm^2) 0.8 cm^2 3/16/2020 10:00 AM   Tunneling (cm) 0 cm 3/16/2020 10:00 AM   Undermining (cm) 0 cm 3/16/2020 10:00 AM   Wound Odor None 3/16/2020 10:00 AM   Pulses Right;DP;PT;Doppler;Other (Comments) 3/16/2020 10:00 AM   Right Foot Monofilament 10-point exam (Sensate) 910 3/16/2020 10:00 AM   Left Foot Monofilament 10-point exam (Sensate) 1010 3/16/2020 10:00 AM   Exposed Structures None;Other (Comments) 3/16/2020 10:00 AM          PROCEDURE:   -2% viscous lidocaine applied topically to wound bed for approximately 5 minutes prior to debridement  -Forceps/curette used to debride wound bed.  Excisional debridement was performed to remove devitalized tissue until healthy, bleeding tissue was visualized.   Entire surface of wound, 0.8 cm2 debrided.  Tissue debrided into the subcutaneous layer.    -Bleeding controlled with manual pressure.    -Wound care completed by wound RN, refer to flowsheet  -Patient tolerated the procedure well, without c/o pain or discomfort.       Pertinent Labs and Diagnostics:    Labs:     A1c:   Lab Results   Component Value Date/Time    HBA1C 6.9 (H) 2020 09:37 AM          IMAGIN2020 DX-foot-complete 3+  Mildly increased soft tissue swelling.     Periarticular erosion most conspicuous about the great toe proximal phalanx which could represent osteomyelitis end your septic arthritis. MRI without and with contrast is recommended for further evaluation.    MRI-foot-with and without dated 2020  IMPRESSION:     First metatarsal and proximal phalanx  osteomyelitis with possible necrosis. First metatarsal-phalangeal effusion from septic arthropathy is not seen, indicating the joint fluid is likely draining through the dorsal soft tissues     Dorsal lateral great toe skin ulceration with probable draining sinus and underlying necrotic tissue interposed between the first and second metatarsal shafts, wrapping under the plantar aspect of the proximal first metatarsal shaft     Medial forefoot cellulitis, myositis     Mild midfoot osteoarthritis    VASCULAR STUDIES: N/A    LAST BLOOD CULTURE:  DATE : 2/5/2020   Growth detected by Bactec instrument. 02/06/2020  18:16Abnormal     Culture Result Abnormal    Staphylococcus aureus   See previous culture for sensitivity report.                  ASSESSMENT AND PLAN:   1. History of amputation of right great toe (HCC)  -Patient is amputation site is nearly healed with only a small area remaining had overlying scab.  Scab was removed to reveal small area of fibrous tissue.  -Excisional debridement of wound in clinic today, medically necessary to promote wound healing.  -Patient to return to clinic weekly for assessment and debridement  -Patient to change dressing 1-2 times per week in between clinic visits      2. Wound infection  Comes: Patient is currently residing at a SNF where he is receiving IV antibiotic therapy  -No signs of symptom infection at this clinic visit  -Continue to monitor for signs symptoms of infection    3. Type 2 diabetes mellitus with other specified complication, without long-term current use of insulin ([facility])  Instructed patient to keep tight control over FBS, <140 for optimal wound healing; Implications of loss of protective sensation (LOPS) discussed with patient, including increased risk for amputation.  Advised to check feet at least daily, moisturize feet, and to always wear protective foot wear, arrange meticulous regular foot care by podiatrist or [facility]. Pt with good understanding.       >15  min spent face to face with patient, >50% of time spent counseling, coordinating care, reviewing records, discussing POC, educating patient  PATIENT EDUCATION  - Importance of adequate nutrition for wound healing  -Advised to go to ER for any increased redness, swelling, drainage, or odor, or if patient develops fever, chills, nausea or vomiting.     15 min spent face to face with patient, >50% of time spent counseling, coordinating care, reviewing records, discussing POC, educating patient regarding wound healing and progression.  This time was spent in excess to procedure time.       Please note that this note may have been created using voice recognition software. I have worked with technical experts from [facility] to optimize the interface.  I have made every reasonable attempt to correct obvious errors, but there may be errors of grammar and possibly content that I did not discover before finalizing the note.    N

## 2020-03-23 ENCOUNTER — NON-PROVIDER VISIT (OUTPATIENT)
Dept: WOUND CARE | Facility: MEDICAL CENTER | Age: 55
End: 2020-03-23
Attending: NURSE PRACTITIONER
Payer: COMMERCIAL

## 2020-03-23 PROCEDURE — 97597 DBRDMT OPN WND 1ST 20 CM/<: CPT

## 2020-03-23 NOTE — PATIENT INSTRUCTIONS
Should you experience any significant changes in your wound(s) such as infection (redness, swelling, localized heat, increased pain, fever >101 F, chills) or have any questions regarding your home care instructions, please contact the wound center [phone]. If after hours, contact your primary care physician or go the hospital emergency room.  Change dressing if over saturated, soiled or its falling off or twice during the week as demonstrated during your appointment.

## 2020-03-23 NOTE — PROCEDURES
CSWD with scissors & forceps to remove non-viable crust at edges and non-viable dried crust over top of wound bed of ~1cm2 to right foot wound.

## 2020-03-27 ENCOUNTER — HOSPITAL ENCOUNTER (OUTPATIENT)
Dept: LAB | Facility: MEDICAL CENTER | Age: 55
End: 2020-03-27
Attending: NURSE PRACTITIONER
Payer: COMMERCIAL

## 2020-03-27 DIAGNOSIS — M86.271 SUBACUTE OSTEOMYELITIS OF RIGHT FOOT (HCC): ICD-10-CM

## 2020-03-27 DIAGNOSIS — A49.01 MSSA (METHICILLIN SUSCEPTIBLE STAPHYLOCOCCUS AUREUS) INFECTION: ICD-10-CM

## 2020-03-27 LAB
ALBUMIN SERPL BCP-MCNC: 4.8 G/DL (ref 3.2–4.9)
ALBUMIN/GLOB SERPL: 1.8 G/DL
ALP SERPL-CCNC: 79 U/L (ref 30–99)
ALT SERPL-CCNC: 19 U/L (ref 2–50)
ANION GAP SERPL CALC-SCNC: 12 MMOL/L (ref 7–16)
AST SERPL-CCNC: 21 U/L (ref 12–45)
BASOPHILS # BLD AUTO: 0.9 % (ref 0–1.8)
BASOPHILS # BLD: 0.05 K/UL (ref 0–0.12)
BILIRUB SERPL-MCNC: 0.4 MG/DL (ref 0.1–1.5)
BUN SERPL-MCNC: 14 MG/DL (ref 8–22)
CALCIUM SERPL-MCNC: 9.6 MG/DL (ref 8.5–10.5)
CHLORIDE SERPL-SCNC: 102 MMOL/L (ref 96–112)
CO2 SERPL-SCNC: 22 MMOL/L (ref 20–33)
CREAT SERPL-MCNC: 0.83 MG/DL (ref 0.5–1.4)
EOSINOPHIL # BLD AUTO: 0.14 K/UL (ref 0–0.51)
EOSINOPHIL NFR BLD: 2.6 % (ref 0–6.9)
ERYTHROCYTE [DISTWIDTH] IN BLOOD BY AUTOMATED COUNT: 42.3 FL (ref 35.9–50)
ERYTHROCYTE [SEDIMENTATION RATE] IN BLOOD BY WESTERGREN METHOD: 13 MM/HOUR (ref 0–20)
GLOBULIN SER CALC-MCNC: 2.7 G/DL (ref 1.9–3.5)
GLUCOSE SERPL-MCNC: 138 MG/DL (ref 65–99)
HCT VFR BLD AUTO: 41.6 % (ref 42–52)
HGB BLD-MCNC: 13.6 G/DL (ref 14–18)
IMM GRANULOCYTES # BLD AUTO: 0.02 K/UL (ref 0–0.11)
IMM GRANULOCYTES NFR BLD AUTO: 0.4 % (ref 0–0.9)
LYMPHOCYTES # BLD AUTO: 1.61 K/UL (ref 1–4.8)
LYMPHOCYTES NFR BLD: 29.9 % (ref 22–41)
MCH RBC QN AUTO: 27.1 PG (ref 27–33)
MCHC RBC AUTO-ENTMCNC: 32.7 G/DL (ref 33.7–35.3)
MCV RBC AUTO: 82.9 FL (ref 81.4–97.8)
MONOCYTES # BLD AUTO: 0.54 K/UL (ref 0–0.85)
MONOCYTES NFR BLD AUTO: 10 % (ref 0–13.4)
NEUTROPHILS # BLD AUTO: 3.03 K/UL (ref 1.82–7.42)
NEUTROPHILS NFR BLD: 56.2 % (ref 44–72)
NRBC # BLD AUTO: 0 K/UL
NRBC BLD-RTO: 0 /100 WBC
PLATELET # BLD AUTO: 257 K/UL (ref 164–446)
PMV BLD AUTO: 10 FL (ref 9–12.9)
POTASSIUM SERPL-SCNC: 4.1 MMOL/L (ref 3.6–5.5)
PROT SERPL-MCNC: 7.5 G/DL (ref 6–8.2)
RBC # BLD AUTO: 5.02 M/UL (ref 4.7–6.1)
SODIUM SERPL-SCNC: 136 MMOL/L (ref 135–145)
WBC # BLD AUTO: 5.4 K/UL (ref 4.8–10.8)

## 2020-03-27 PROCEDURE — 36415 COLL VENOUS BLD VENIPUNCTURE: CPT

## 2020-03-27 PROCEDURE — 85025 COMPLETE CBC W/AUTO DIFF WBC: CPT

## 2020-03-27 PROCEDURE — 85652 RBC SED RATE AUTOMATED: CPT

## 2020-03-27 PROCEDURE — 80053 COMPREHEN METABOLIC PANEL: CPT

## 2020-03-30 ENCOUNTER — OFFICE VISIT (OUTPATIENT)
Dept: WOUND CARE | Facility: MEDICAL CENTER | Age: 55
End: 2020-03-30
Attending: NURSE PRACTITIONER
Payer: COMMERCIAL

## 2020-03-30 VITALS
RESPIRATION RATE: 18 BRPM | OXYGEN SATURATION: 99 % | DIASTOLIC BLOOD PRESSURE: 90 MMHG | SYSTOLIC BLOOD PRESSURE: 137 MMHG | HEART RATE: 91 BPM | TEMPERATURE: 97.7 F

## 2020-03-30 DIAGNOSIS — L08.9 WOUND INFECTION: ICD-10-CM

## 2020-03-30 DIAGNOSIS — E11.69 TYPE 2 DIABETES MELLITUS WITH OTHER SPECIFIED COMPLICATION, WITHOUT LONG-TERM CURRENT USE OF INSULIN (HCC): ICD-10-CM

## 2020-03-30 DIAGNOSIS — T14.8XXA WOUND INFECTION: ICD-10-CM

## 2020-03-30 DIAGNOSIS — Z89.411 HISTORY OF AMPUTATION OF RIGHT GREAT TOE (HCC): Primary | ICD-10-CM

## 2020-03-30 PROCEDURE — 11042 DBRDMT SUBQ TIS 1ST 20SQCM/<: CPT | Performed by: NURSE PRACTITIONER

## 2020-03-30 PROCEDURE — 11042 DBRDMT SUBQ TIS 1ST 20SQCM/<: CPT

## 2020-03-30 ASSESSMENT — ENCOUNTER SYMPTOMS
WHEEZING: 0
SHORTNESS OF BREATH: 0
FEVER: 0
COUGH: 0
VOMITING: 0
WEAKNESS: 0
NAUSEA: 0
DIZZINESS: 0
CHILLS: 0
PALPITATIONS: 0
SPUTUM PRODUCTION: 0
MYALGIAS: 0

## 2020-03-30 ASSESSMENT — PAIN SCALES - GENERAL: PAINLEVEL: NO PAIN

## 2020-03-30 NOTE — PROGRESS NOTES
Provider Encounter- Full Thickness wound    HISTORY OF PRESENT ILLNESS  Wound History:    START OF CARE IN CLINIC: 3/16/2020    REFERRING PROVIDER: JOSH Duenas     WOUND- Full Thickness Wound   LOCATION: Right first ray   HISTORY: Patient reports that he was jogging on 01/17/2020 when he sustained a fall.  3 days later he noticed increasing pain, erythema, swelling and development of an open wound.  He presented to the emergency department where an x-ray was performed which showed no evidence of fracture.  Patient was discharged home with some pain medication.  Patient reports that even though he had pain medication the pain continued with worsening erythema, swelling and new purulent drainage.  He subsequently represented to the ED again on 2/3/2020 with worsening right foot pain.  During this admission he was found to have osteomyelitis of the distal first metatarsal and proximal phalanx with abscess/extension of soft tissue involvement. At that time he was determined to be septic with bacteremia, cultures taken were positive for MSSA.  On 02/05/2020 patient underwent amputation and I&D by Dr. Randall.  A PICC line was placed and per IDs recommendations patient was to continue on IV cefazolin for 6 weeks.  Patient was transferred to a SNF for further treatment of the wound and IV antibiotic administration.  The patient was referred to [facility] for wound management.    Pertinent Medical History: Obesity, hyperlipidemia, diabetes mellitus, retinal detachment    TOBACCO USE: Never smoked or use smokeless tobacco    Patient's problem list, allergies, and current medications reviewed and updated in Epic    Interval History:  3/22/2020: Clinic visit with JOSH Marques. Patient states that they are feeling well today.  Patient denies fever, chills, nausea, vomiting, lightheadedness, dizziness, shortness of breath and chest pain.  Patient presents with small scabbed over wound bed.  Scab was removed to  reveal some fibrous tissue underneath.  Overall the wound is healthy and healing appropriately.    3/30/2020: Clinic visit with JOSH Marques. Patient states that they are feeling well today.  Patient denies fever, chills, nausea, vomiting, lightheadedness, dizziness, shortness of breath and chest pain.  Patient had collagen crusted over wound bed which was removed to reveal the wound was relatively the same size.      REVIEW OF SYSTEMS:   Review of Systems   Constitutional: Negative for chills and fever.   Respiratory: Negative for cough, sputum production, shortness of breath and wheezing.    Cardiovascular: Negative for chest pain, palpitations and leg swelling.   Gastrointestinal: Negative for nausea and vomiting.   Musculoskeletal: Negative for myalgias.   Skin: Negative for itching and rash.   Neurological: Negative for dizziness and weakness.       PHYSICAL EXAMINATION:   /90   Pulse 91   Temp 36.5 °C (97.7 °F)   Resp 18   SpO2 99%     Physical Exam   Constitutional: He is oriented to person, place, and time and well-developed, well-nourished, and in no distress.   HENT:   Head: Normocephalic and atraumatic.   Eyes: Pupils are equal, round, and reactive to light.   Neck: Normal range of motion.   Cardiovascular: Intact distal pulses.   Pulmonary/Chest: Effort normal. No respiratory distress. He has no wheezes.   Musculoskeletal: Normal range of motion.   Neurological: He is alert and oriented to person, place, and time.   Skin: Skin is warm. No rash noted. No erythema.   Right first ray amputation  See doc flowsheets   Psychiatric: Mood, memory, affect and judgment normal.       WOUND ASSESSMENT     Wound 03/16/20 Diabetic Ulcer Right great toe amputation site (Active)   Wound Image    3/30/2020  9:45 AM   Site Assessment Other (Comment);Black;Boggy 3/30/2020  9:45 AM   Periwound Assessment Edema;Scar tissue 3/30/2020  9:45 AM   Margins Attached edges 3/30/2020  9:45 AM   Closure Secondary  intention 3/30/2020  9:45 AM   Drainage Amount Scant 3/30/2020  9:45 AM   Drainage Description Serosanguineous 3/30/2020  9:45 AM   Treatments Cleansed;Provider debridement;Topical Lidocaine 3/30/2020  9:45 AM   Wound Cleansing Puracyn [place] 3/30/2020  9:45 AM   Periwound Protectant Skin Protectant Wipes to Periwound 3/30/2020  9:45 AM   Dressing Cleansing/Solutions Normal Saline 3/30/2020  9:45 AM   Dressing Options Tubigrip;Other (Comments);Nonadhesive Foam;Hypafix Tape;Collagen Dressing 3/30/2020  9:45 AM   Dressing Changed Changed 3/23/2020  8:30 AM   Dressing Status Clean;Dry;Intact 3/30/2020  9:45 AM   Dressing Change/Treatment Frequency Every 48 hrs, and As Needed 3/30/2020  9:45 AM   Wound Width (cm) 0.8 cm 3/23/2020  8:30 AM   Wound Depth (cm) 0.6 cm 3/23/2020  8:30 AM   Wound Surface Area (cm^2) 0.8 cm^2 3/23/2020  8:30 AM   Wound Volume (cm^3) 0.48 cm^3 3/23/2020  8:30 AM   Post-Procedure Length (cm) 1.1 cm 3/30/2020  9:45 AM   Post-Procedure Width (cm) 0.6 cm 3/30/2020  9:45 AM   Post-Procedure Depth (cm) 0.3 cm 3/30/2020  9:45 AM   Post-Procedure Surface Area (cm^2) 0.66 cm^2 3/30/2020  9:45 AM   Post-Procedure Volume (cm^3) 0.2 cm^3 3/30/2020  9:45 AM   Wound Bed Slough (%) 90 % 3/23/2020  8:30 AM   Wound Bed Granulation (%) - Post-Procedure 30 % 3/23/2020  8:30 AM   Tunneling (cm) 0 cm 3/30/2020  9:45 AM   Undermining (cm) 0 cm 3/30/2020  9:45 AM   Wound Odor None 3/30/2020  9:45 AM   Pulses Right;DP;PT;Doppler;Other (Comments) 3/16/2020 10:00 AM   Right Foot Monofilament 10-point exam (Sensate) 9/10 3/16/2020 10:00 AM   Left Foot Monofilament 10-point exam (Sensate) 10/10 3/16/2020 10:00 AM   Exposed Structures Other (Comments) 3/30/2020  9:45 AM         PROCEDURE:   -2% viscous lidocaine applied topically to wound bed for approximately 5 minutes prior to debridement  -Forceps/curette used to debride wound bed.  Excisional debridement was performed to remove devitalized tissue until healthy, bleeding  tissue was visualized.   Entire surface of wound, 0.66 cm2 debrided.  Tissue debrided into the subcutaneous layer.    -Bleeding controlled with manual pressure.    -Wound care completed by wound RN, refer to flowsheet  -Patient tolerated the procedure well, without c/o pain or discomfort.       Pertinent Labs and Diagnostics:    Labs:     A1c:   Lab Results   Component Value Date/Time    HBA1C 6.9 (H) 2020 09:37 AM          IMAGIN2020 DX-foot-complete 3+  Mildly increased soft tissue swelling.     Periarticular erosion most conspicuous about the great toe proximal phalanx which could represent osteomyelitis end your septic arthritis. MRI without and with contrast is recommended for further evaluation.    MRI-foot-with and without dated 2020  IMPRESSION:     First metatarsal and proximal phalanx osteomyelitis with possible necrosis. First metatarsal-phalangeal effusion from septic arthropathy is not seen, indicating the joint fluid is likely draining through the dorsal soft tissues     Dorsal lateral great toe skin ulceration with probable draining sinus and underlying necrotic tissue interposed between the first and second metatarsal shafts, wrapping under the plantar aspect of the proximal first metatarsal shaft     Medial forefoot cellulitis, myositis     Mild midfoot osteoarthritis    VASCULAR STUDIES: N/A    LAST BLOOD CULTURE:  DATE : 2020   Growth detected by Bactec instrument. 2020  18:16Abnormal     Culture Result Abnormal    Staphylococcus aureus   See previous culture for sensitivity report.                  ASSESSMENT AND PLAN:   1. History of amputation of right great toe (HCC)  -Patient is amputation site is nearly healed with only a small area remaining had overlying dried collagen with a little bit of dried blood.  Scab/collagen was removed to reveal small area 50% fibrous tissue 50% red beefy granulation tissue.  -Excisional debridement of wound in clinic today, medically  necessary to promote wound healing.  -Patient to return to clinic weekly for assessment and debridement  -Patient to change dressing 1-2 times per week in between clinic visits if saturated if not saturated patient is to leave dressing entire week until next clinic visit.  -Patient is to not add additional collagen I believe that he had multiple layers of collagen forming a crust over the wound bed that was dry and not moist.  Prohibiting the wound to healing properly.     Wound Care: Collagen placed in clinic only.  Hydrofiber silver, nonadhesive foam, Hypafix tape, Tubigrip D    2. Wound infection  Comes: Patient is currently residing at a SNF where he is receiving IV antibiotic therapy need to verify if patient has been DC'd from SNF forgot to ask today 3/30  -No signs of symptom infection at this clinic visit  -Continue to monitor for signs symptoms of infection  -Patient reports he has an appointment Wednesday with her Hedy MORENO infectious disease.    3. Type 2 diabetes mellitus with other specified complication, without long-term current use of insulin (HCC)  Instructed patient to keep tight control over FBS, <140 for optimal wound healing; Implications of loss of protective sensation (LOPS) discussed with patient, including increased risk for amputation.  Advised to check feet at least daily, moisturize feet, and to always wear protective foot wear, arrange meticulous regular foot care by podiatrist or [facility]. Pt with good understanding.         PATIENT EDUCATION  - Importance of adequate nutrition for wound healing  -Advised to go to ER for any increased redness, swelling, drainage, or odor, or if patient develops fever, chills, nausea or vomiting.       Please note that this note may have been created using voice recognition software. I have worked with technical experts from [employer] to optimize the interface.  I have made every reasonable attempt to correct obvious errors, but there may be  errors of grammar and possibly content that I did not discover before finalizing the note.    N

## 2020-03-30 NOTE — PATIENT INSTRUCTIONS
Avoid prolonged standing or sitting without elevating your legs.  - Apply tubigrip to your legs ending 2 fingers below back of knee without wrinkles.     If compression needs to be removed, un-wrap it do not cut it off.     Should you experience any significant changes in your wound(s), such as infection (redness, swelling, localized heat, increased pain, fever > 101 F, chills) or have any questions regarding your home care instructions, please contact the wound center at [phone]. If after hours, contact your primary care physician or go to the hospital emergency room.   Keep dressing clean, dry and covered while bathing. Only change dressing if it becomes over saturated, soiled or falls off.

## 2020-04-01 ENCOUNTER — OFFICE VISIT (OUTPATIENT)
Dept: INFECTIOUS DISEASES | Facility: MEDICAL CENTER | Age: 55
End: 2020-04-01
Payer: COMMERCIAL

## 2020-04-01 VITALS
TEMPERATURE: 97.7 F | WEIGHT: 174 LBS | BODY MASS INDEX: 30.83 KG/M2 | HEART RATE: 96 BPM | SYSTOLIC BLOOD PRESSURE: 130 MMHG | OXYGEN SATURATION: 98 % | DIASTOLIC BLOOD PRESSURE: 80 MMHG | HEIGHT: 63 IN

## 2020-04-01 DIAGNOSIS — E11.52 TYPE 2 DIABETES MELLITUS WITH DIABETIC PERIPHERAL ANGIOPATHY AND GANGRENE, WITHOUT LONG-TERM CURRENT USE OF INSULIN (HCC): ICD-10-CM

## 2020-04-01 DIAGNOSIS — M86.271 SUBACUTE OSTEOMYELITIS OF RIGHT FOOT (HCC): ICD-10-CM

## 2020-04-01 DIAGNOSIS — A49.01 MSSA (METHICILLIN SUSCEPTIBLE STAPHYLOCOCCUS AUREUS) INFECTION: ICD-10-CM

## 2020-04-01 PROCEDURE — 99213 OFFICE O/P EST LOW 20 MIN: CPT | Performed by: NURSE PRACTITIONER

## 2020-04-01 RX ORDER — SULFAMETHOXAZOLE AND TRIMETHOPRIM 800; 160 MG/1; MG/1
1 TABLET ORAL 2 TIMES DAILY
COMMUNITY
End: 2020-06-21

## 2020-04-01 ASSESSMENT — ENCOUNTER SYMPTOMS
MYALGIAS: 0
NAUSEA: 0
WEAKNESS: 0
PALPITATIONS: 0
CONSTIPATION: 0
HEADACHES: 0
ABDOMINAL PAIN: 0
NERVOUS/ANXIOUS: 0
VOMITING: 0
DIARRHEA: 0
SENSORY CHANGE: 1
CHILLS: 0
SORE THROAT: 0
SHORTNESS OF BREATH: 0
TINGLING: 1
FEVER: 0
COUGH: 0

## 2020-04-01 ASSESSMENT — FIBROSIS 4 INDEX: FIB4 SCORE: 0.97

## 2020-04-01 NOTE — PROGRESS NOTES
Infectious Disease Clinic    Subjective:     Chief Complaint   Patient presents with   • Follow-Up     Subacute osteomyelitis of right foot     Interval History: 54-year-old  man with a history of type 2 diabetes mellitus.  Hospitalized from 2/3/2020 - 2/14/2020, admitted for worsening right great toe swelling, erythema and pain.  Patient was running on 01/17/2020 when he sustained a ground-level fall.  Three days later, he noted increasing pain, swelling, erythema and a open wound.  He initially presented to the emergency department at that time where an x-ray did not show any evidence of fracture.  He was discharged home on pain medication.  Despite his pain medications, he continued to have persistent right foot pain with worsening erythema, swelling and purulent drainage. On presentation, he was afebrile with a mild leukocytosis of 13.2.  ESR elevated at 91.  X-ray revealed periarticular erosion of the great toe proximal phalanx concerning for osteomyelitis.  MRI showed first metatarsal and proximal phalanx osteomyelitis with possible necrosis.  1 out of 2 blood cultures on 2/3 & 2/5 were +MSSA.  TTE negative on 2/6.  Repeat Bcx on 2/7 were negative.  Underwent Right foot I&D and Right first ray amputation to the metatarsal shaft on 2/5 with Dr. Randall.  OR cx on 2/5 +MSSA.  Pathology on 2/5 suspicious for OM.  Underwent repeat Right foot I&D with closure on 2/7 with Dr. Randall.  Discharged to SNF on IV Cefazolin x 6 weeks, estimated end date  3/20/20.      2/28/2020: Seen by JOSH Narayan.  Residing at [facility].  Denies any drainage from the right great toe amp site.  Continue IV Cefazolin through 3/20/20.    3/20/2020:  Seen by JOSH Narayan.  Continues to reside at [facility]; however, he is planning to discharge tomorrow.  Notes that he was seen by Dr. Randall this past Tuesday, he is happy with the progress and next follow-up was on May 19.  States there has not been  any drainage from the right great toe amp site for the last couple of weeks, he feels that it is doing well overall.  Start p.o. Bactrim DS twice daily x30 days on 3/21, will plan to treat anywhere from 1 to 3 months, depending on rate of healing and lab results.  CBC, CMP an and ESR to be done on 3/31, 10 days after starting p.o. Bactrim.    Today, 4/1/2020: Was discharged from the SNF on the p.o. Bactrim.  States that he has been doing well with the medication without any issues. Denies feeling generally ill, fevers/chills, general malaise, headache, n/v/d, abdominal pain, chest pain or shortness of breath.  Is being seen at the wound care clinic on a weekly basis, states there is been no drainage and no odor from the wound site.  Notes that it is nearly healed.  He continues to wear the walking boot as he is having shoes made by [employer], was told that they may be ready sometime next week.  He denies pain, but notes that he does have a tingling sensation with some burning that comes and goes to both feet.  Has a follow-up with Dr. Randall on 5/19/2020.  Blood sugar was 91 this a.m.    Review of Systems   Constitutional: Negative for chills, fever and malaise/fatigue.   HENT: Negative for congestion and sore throat.    Respiratory: Negative for cough and shortness of breath.    Cardiovascular: Negative for chest pain, palpitations and leg swelling.   Gastrointestinal: Negative for abdominal pain, constipation, diarrhea, nausea and vomiting.   Genitourinary: Negative for dysuria.   Musculoskeletal: Negative for joint pain and myalgias.   Skin: Negative for itching and rash.   Neurological: Positive for tingling and sensory change. Negative for weakness and headaches.   Psychiatric/Behavioral: The patient is not nervous/anxious.        Past Medical History:   Diagnosis Date   • Glaucoma 5/23/2012       Family History   Problem Relation Age of Onset   • Cancer Neg Hx    • Diabetes Neg Hx    • Heart Disease Neg Hx   "  • Hypertension Neg Hx        Social History     Tobacco Use   • Smoking status: Never Smoker   • Smokeless tobacco: Never Used   Substance Use Topics   • Alcohol use: Not Currently     Comment: no ETOH x17 years   • Drug use: Never       Allergies: Patient has no known allergies.    Pt's medication and problem list reviewed.     Objective:     /80 (BP Location: Left arm, Patient Position: Sitting, BP Cuff Size: Adult)   Pulse 96   Temp 36.5 °C (97.7 °F) (Temporal)   Ht 1.6 m (5' 3\")   Wt 78.9 kg (174 lb)   SpO2 98%   BMI 30.82 kg/m²     Physical Exam   Constitutional: He is oriented to person, place, and time and well-developed, well-nourished, and in no distress. No distress.   HENT:   Head: Normocephalic and atraumatic.   Mouth/Throat: Oropharynx is clear and moist. No oropharyngeal exudate.   Eyes: Pupils are equal, round, and reactive to light. Conjunctivae and EOM are normal. No scleral icterus.   Neck: Normal range of motion. Neck supple. No tracheal deviation present.   Cardiovascular: Normal rate, regular rhythm and normal heart sounds. Exam reveals no friction rub.   No murmur heard.  Pulmonary/Chest: Effort normal and breath sounds normal. No respiratory distress. He has no wheezes. He exhibits no tenderness.   Abdominal: Soft. Bowel sounds are normal. He exhibits no distension. There is no abdominal tenderness.   Musculoskeletal:         General: Deformity present. No edema.      Comments: Limited RLE ROM due to walking boot.    Right hallux amp site-dressing in place, pictures from 3/30 reviewed.  1.1 x 0.6 x 0.3 cm, wound bed pink, no maceration to wound edges, no erythema to surrounding tissue.   Lymphadenopathy:     He has no cervical adenopathy.   Neurological: He is alert and oriented to person, place, and time. No cranial nerve deficit.   Fairly steady gait   Skin: Skin is warm and dry. No rash noted. No erythema.   Psychiatric: Mood, memory, affect and judgment normal.   Pleasant "   Vitals reviewed.    Labs:   Ref. Range 3/27/2020 16:01   WBC Latest Ref Range: 4.8 - 10.8 K/uL 5.4   RBC Latest Ref Range: 4.70 - 6.10 M/uL 5.02   Hemoglobin Latest Ref Range: 14.0 - 18.0 g/dL 13.6 (L)   Hematocrit Latest Ref Range: 42.0 - 52.0 % 41.6 (L)   MCV Latest Ref Range: 81.4 - 97.8 fL 82.9   MCH Latest Ref Range: 27.0 - 33.0 pg 27.1   MCHC Latest Ref Range: 33.7 - 35.3 g/dL 32.7 (L)   RDW Latest Ref Range: 35.9 - 50.0 fL 42.3   Platelet Count Latest Ref Range: 164 - 446 K/uL 257   MPV Latest Ref Range: 9.0 - 12.9 fL 10.0   Neutrophils-Polys Latest Ref Range: 44.00 - 72.00 % 56.20   Neutrophils (Absolute) Latest Ref Range: 1.82 - 7.42 K/uL 3.03   Lymphocytes Latest Ref Range: 22.00 - 41.00 % 29.90   Lymphs (Absolute) Latest Ref Range: 1.00 - 4.80 K/uL 1.61   Monocytes Latest Ref Range: 0.00 - 13.40 % 10.00   Monos (Absolute) Latest Ref Range: 0.00 - 0.85 K/uL 0.54   Eosinophils Latest Ref Range: 0.00 - 6.90 % 2.60   Eos (Absolute) Latest Ref Range: 0.00 - 0.51 K/uL 0.14   Basophils Latest Ref Range: 0.00 - 1.80 % 0.90   Baso (Absolute) Latest Ref Range: 0.00 - 0.12 K/uL 0.05   Immature Granulocytes Latest Ref Range: 0.00 - 0.90 % 0.40   Immature Granulocytes (abs) Latest Ref Range: 0.00 - 0.11 K/uL 0.02   Nucleated RBC Latest Units: /100 WBC 0.00   NRBC (Absolute) Latest Units: K/uL 0.00   Sed Rate Westergren Latest Ref Range: 0 - 20 mm/hour 13   Sodium Latest Ref Range: 135 - 145 mmol/L 136   Potassium Latest Ref Range: 3.6 - 5.5 mmol/L 4.1   Chloride Latest Ref Range: 96 - 112 mmol/L 102   Co2 Latest Ref Range: 20 - 33 mmol/L 22   Anion Gap Latest Ref Range: 7.0 - 16.0  12.0   Glucose Latest Ref Range: 65 - 99 mg/dL 138 (H)   Bun Latest Ref Range: 8 - 22 mg/dL 14   Creatinine Latest Ref Range: 0.50 - 1.40 mg/dL 0.83   GFR If  Latest Ref Range: >60 mL/min/1.73 m 2 >60   GFR If Non  Latest Ref Range: >60 mL/min/1.73 m 2 >60   Calcium Latest Ref Range: 8.5 - 10.5 mg/dL 9.6    AST(SGOT) Latest Ref Range: 12 - 45 U/L 21   ALT(SGPT) Latest Ref Range: 2 - 50 U/L 19   Alkaline Phosphatase Latest Ref Range: 30 - 99 U/L 79   Total Bilirubin Latest Ref Range: 0.1 - 1.5 mg/dL 0.4   Albumin Latest Ref Range: 3.2 - 4.9 g/dL 4.8   Total Protein Latest Ref Range: 6.0 - 8.2 g/dL 7.5   Globulin Latest Ref Range: 1.9 - 3.5 g/dL 2.7   A-G Ratio Latest Units: g/dL 1.8       Assessment and Plan:   The following treatment plan was discussed with patient at length:    1. Subacute osteomyelitis of right foot (HCC)      -Was given a 30-day supply of p.o. Bactrim upon discharge from SNF.  Directed to finish 30-day course.  Last ESR was WNL, amp site healing without signs of infection.  Do not feel that additional antibiotics are warranted.  -Monitor for s/sx of worsening off abx: increased redness, pain, swelling, drainage, breakdown of surgical site, fevers, chills, general malaise, etc.  Notify ID or go to ER should these s/sx occur.  -Continue with wound care as directed.  -Follow-up with Dr. Randall as directed.   2. MSSA (methicillin susceptible Staphylococcus aureus) infection      As above   3. Type 2 diabetes mellitus with diabetic peripheral angiopathy and gangrene, without long-term current use of insulin (HCC)      Keep blood sugar under 150 to promote wound healing and control infection.     Follow up: PRN, RTC sooner if needed. FU with PCP for ongoing chronic medical conditions.     CHANTAL Alba.P.R.N.       Please note that this dictation was created using voice recognition software. I have  worked with technical experts from [facility] to optimize the interface.  I have made every reasonable attempt to correct obvious errors, but there may be errors of grammar and possibly content that I did not discover before finalizing the note.

## 2020-04-06 ENCOUNTER — OFFICE VISIT (OUTPATIENT)
Dept: WOUND CARE | Facility: MEDICAL CENTER | Age: 55
End: 2020-04-06
Attending: NURSE PRACTITIONER
Payer: COMMERCIAL

## 2020-04-06 VITALS
DIASTOLIC BLOOD PRESSURE: 91 MMHG | SYSTOLIC BLOOD PRESSURE: 135 MMHG | TEMPERATURE: 97.9 F | OXYGEN SATURATION: 98 % | HEART RATE: 96 BPM | RESPIRATION RATE: 16 BRPM

## 2020-04-06 DIAGNOSIS — Z89.411 HISTORY OF AMPUTATION OF RIGHT GREAT TOE (HCC): Primary | ICD-10-CM

## 2020-04-06 DIAGNOSIS — E11.69 TYPE 2 DIABETES MELLITUS WITH OTHER SPECIFIED COMPLICATION, WITHOUT LONG-TERM CURRENT USE OF INSULIN (HCC): ICD-10-CM

## 2020-04-06 DIAGNOSIS — T14.8XXA WOUND INFECTION: ICD-10-CM

## 2020-04-06 DIAGNOSIS — L08.9 WOUND INFECTION: ICD-10-CM

## 2020-04-06 PROCEDURE — 11042 DBRDMT SUBQ TIS 1ST 20SQCM/<: CPT

## 2020-04-06 PROCEDURE — 11042 DBRDMT SUBQ TIS 1ST 20SQCM/<: CPT | Performed by: NURSE PRACTITIONER

## 2020-04-06 RX ORDER — LANCETS 33 GAUGE
EACH MISCELLANEOUS
COMMUNITY
Start: 2020-03-28

## 2020-04-06 RX ORDER — BLOOD-GLUCOSE METER
EACH MISCELLANEOUS
COMMUNITY
Start: 2020-03-28

## 2020-04-06 ASSESSMENT — ENCOUNTER SYMPTOMS
FEVER: 0
NAUSEA: 0
COUGH: 0
VOMITING: 0
WHEEZING: 0
PALPITATIONS: 0
SPUTUM PRODUCTION: 0
SHORTNESS OF BREATH: 0
DIZZINESS: 0
CHILLS: 0
MYALGIAS: 0
WEAKNESS: 0

## 2020-04-06 NOTE — PATIENT INSTRUCTIONS
-Keep dressings clean, dry and covered while bathing. Change dressings if they become over saturated, soiled or fall off; or once in between clinic visits.     -Avoid prolonged standing or sitting without elevating your legs.    -Remove your compression garments if you have severe pain, severe swelling, numbness, color change, or temperature change in your toes. If you need to remove your compression garments, do so by unrolling them. Do not cut the compression garments off, this is to prevent cutting yourself on accident.    -Wear your offloading boot any time you are up walking.    -Never walk around the house barefoot. Always wear a rubber soled slipper when walking around the house.    -Should you experience any significant changes in your wound(s), such as infection (redness, swelling, localized heat, increased pain, fever > 101 F, chills) or have any questions regarding your home care instructions, please contact the wound center at [phone]. If after hours, contact your primary care physician or go to the hospital emergency room.

## 2020-04-13 ENCOUNTER — NON-PROVIDER VISIT (OUTPATIENT)
Dept: WOUND CARE | Facility: MEDICAL CENTER | Age: 55
End: 2020-04-13
Attending: NURSE PRACTITIONER
Payer: COMMERCIAL

## 2020-04-13 PROCEDURE — 97597 DBRDMT OPN WND 1ST 20 CM/<: CPT

## 2020-04-13 NOTE — PATIENT INSTRUCTIONS
Avoid prolonged standing or sitting without elevating your legs.  - Apply tubigrip to your legs ending 2 fingers below back of knee without wrinkles.      If compression needs to be removed, un-wrap it do not cut it off.     Should you experience any significant changes in your wound(s), such as infection (redness, swelling, localized heat, increased pain, fever > 101 F, chills) or have any questions regarding your home care instructions, please contact the wound center at [phone]. If after hours, contact your primary care physician or go to the hospital emergency room.   Keep dressing clean, dry and covered while bathing. Only change dressing if it becomes over saturated, soiled or falls off.

## 2020-04-21 ENCOUNTER — OFFICE VISIT (OUTPATIENT)
Dept: WOUND CARE | Facility: MEDICAL CENTER | Age: 55
End: 2020-04-21
Attending: NURSE PRACTITIONER
Payer: COMMERCIAL

## 2020-04-21 VITALS
DIASTOLIC BLOOD PRESSURE: 92 MMHG | OXYGEN SATURATION: 99 % | RESPIRATION RATE: 12 BRPM | SYSTOLIC BLOOD PRESSURE: 152 MMHG | TEMPERATURE: 97.7 F | HEART RATE: 88 BPM

## 2020-04-21 DIAGNOSIS — Z89.411 HISTORY OF AMPUTATION OF RIGHT GREAT TOE (HCC): Primary | ICD-10-CM

## 2020-04-21 DIAGNOSIS — T14.8XXA WOUND INFECTION: ICD-10-CM

## 2020-04-21 DIAGNOSIS — E11.69 TYPE 2 DIABETES MELLITUS WITH OTHER SPECIFIED COMPLICATION, WITHOUT LONG-TERM CURRENT USE OF INSULIN (HCC): ICD-10-CM

## 2020-04-21 DIAGNOSIS — L08.9 WOUND INFECTION: ICD-10-CM

## 2020-04-21 PROCEDURE — 11042 DBRDMT SUBQ TIS 1ST 20SQCM/<: CPT

## 2020-04-21 PROCEDURE — 11042 DBRDMT SUBQ TIS 1ST 20SQCM/<: CPT | Performed by: NURSE PRACTITIONER

## 2020-04-21 ASSESSMENT — ENCOUNTER SYMPTOMS
PALPITATIONS: 0
COUGH: 0
WEAKNESS: 0
SHORTNESS OF BREATH: 0
VOMITING: 0
MYALGIAS: 0
CHILLS: 0
WHEEZING: 0
FEVER: 0
SPUTUM PRODUCTION: 0
DIZZINESS: 0
NAUSEA: 0

## 2020-04-21 ASSESSMENT — PAIN SCALES - GENERAL: PAINLEVEL: 3=SLIGHT PAIN

## 2020-04-21 NOTE — PROGRESS NOTES
Provider Encounter- Full Thickness wound    HISTORY OF PRESENT ILLNESS  Wound History:    START OF CARE IN CLINIC: 3/16/2020    REFERRING PROVIDER: JOSH Duenas     WOUND- Full Thickness Wound   LOCATION: Right first ray   HISTORY: Patient reports that he was jogging on 01/17/2020 when he sustained a fall.  3 days later he noticed increasing pain, erythema, swelling and development of an open wound.  He presented to the emergency department where an x-ray was performed which showed no evidence of fracture.  Patient was discharged home with some pain medication.  Patient reports that even though he had pain medication the pain continued with worsening erythema, swelling and new purulent drainage.  He subsequently represented to the ED again on 2/3/2020 with worsening right foot pain.  During this admission he was found to have osteomyelitis of the distal first metatarsal and proximal phalanx with abscess/extension of soft tissue involvement. At that time he was determined to be septic with bacteremia, cultures taken were positive for MSSA.  On 02/05/2020 patient underwent amputation and I&D by Dr. Randall.  A PICC line was placed and per IDs recommendations patient was to continue on IV cefazolin for 6 weeks.  Patient was transferred to a SNF for further treatment of the wound and IV antibiotic administration.  The patient was referred to [facility] for wound management.    Pertinent Medical History: Obesity, hyperlipidemia, diabetes mellitus, retinal detachment    TOBACCO USE: Never smoked or use smokeless tobacco    Patient's problem list, allergies, and current medications reviewed and updated in Epic    Interval History:  3/22/2020: Clinic visit with JOSH Marques. Patient states that they are feeling well today.  Patient denies fever, chills, nausea, vomiting, lightheadedness, dizziness, shortness of breath and chest pain.  Patient presents with small scabbed over wound bed.  Scab was removed to  reveal some fibrous tissue underneath.  Overall the wound is healthy and healing appropriately.    3/30/2020: Clinic visit with JOSH Marques. Patient states that they are feeling well today.  Patient denies fever, chills, nausea, vomiting, lightheadedness, dizziness, shortness of breath and chest pain.  Patient had collagen crusted over wound bed which was removed to reveal the wound was relatively the same size.    4/6/2020: Clinic visit with JOSH Marques. Patient states that they are feeling well today.  Patient denies fever, chills, nausea, vomiting, lightheadedness, dizziness, shortness of breath and chest pain.  Patient's wound is minimally decreased in size.  We will have the patient change the dressing after 4 days he has not had any significant drainage.  Just want the patient to keep the wound bed moist and the surrounding tissue protected with zinc paste.  Patient to apply saline to collagen dressing as well as over the area of Hydrofiber covering the collagen and no where else.    4/21/2020: Clinic visit with JOSH Marques. Patient states that they are feeling well today.  Patient denies fever, chills, nausea, vomiting, lightheadedness, dizziness, shortness of breath and chest pain.  Wound bed is a little dry this clinic visit.  We will hold collagen and place small amount of honey to wound bed to help promote moisture to wound bed and granulation tissue formation.    REVIEW OF SYSTEMS:   Review of Systems   Constitutional: Negative for chills and fever.   Respiratory: Negative for cough, sputum production, shortness of breath and wheezing.    Cardiovascular: Negative for chest pain, palpitations and leg swelling.   Gastrointestinal: Negative for nausea and vomiting.   Musculoskeletal: Negative for myalgias.   Skin: Negative for itching and rash.   Neurological: Negative for dizziness and weakness.       PHYSICAL EXAMINATION:   /92   Pulse 88   Temp 36.5 °C (97.7 °F)    Resp 12   SpO2 99%     Physical Exam   Constitutional: He is oriented to person, place, and time and well-developed, well-nourished, and in no distress.   HENT:   Head: Normocephalic and atraumatic.   Eyes: Pupils are equal, round, and reactive to light.   Neck: Normal range of motion.   Cardiovascular: Intact distal pulses.   Pulmonary/Chest: Effort normal. No respiratory distress. He has no wheezes.   Musculoskeletal: Normal range of motion.   Neurological: He is alert and oriented to person, place, and time.   Skin: Skin is warm. No rash noted. No erythema.   Right first ray amputation  See doc flowsheets   Psychiatric: Mood, memory, affect and judgment normal.       WOUND ASSESSMENT     Wound 03/16/20 Diabetic Ulcer Right great toe amputation site (Active)   Wound Image    4/6/2020  8:40 AM   Site Assessment Pink;Yellow 4/21/2020  9:00 AM   Periwound Assessment Edema;Scar tissue 4/21/2020  9:00 AM   Margins Attached edges 4/21/2020  9:00 AM   Closure Secondary intention 4/13/2020  8:30 AM   Drainage Amount Scant 4/21/2020  9:00 AM   Drainage Description Serosanguineous 4/21/2020  9:00 AM   Treatments Cleansed;Topical Lidocaine;Provider debridement 4/21/2020  9:00 AM   Wound Cleansing [place] 4/21/2020  9:00 AM   Periwound Protectant Skin Protectant Wipes to Periwound 4/21/2020  9:00 AM   Dressing Cleansing/Solutions Normal Saline 4/21/2020  9:00 AM   Dressing Options Honey Gel;Hydrofiber;Nonadhesive Foam;Hypafix Tape;Tubigrip 4/21/2020  9:00 AM   Dressing Changed Changed 4/21/2020  9:00 AM   Dressing Status Clean;Dry;Intact 4/21/2020  9:00 AM   Dressing Change/Treatment Frequency Every 72 hrs, and As Needed 4/21/2020  9:00 AM   Non-staged Wound Description Full thickness 4/21/2020  9:00 AM   Wound Length (cm) 0.4 cm 4/21/2020  9:00 AM   Wound Width (cm) 0.1 cm 4/21/2020  9:00 AM   Wound Depth (cm) 0.2 cm 4/21/2020  9:00 AM   Wound Surface Area (cm^2) 0.04 cm^2 4/21/2020  9:00 AM   Wound Volume (cm^3)  0.01 cm^3 2020  9:00 AM   Post-Procedure Length (cm) 0.5 cm 2020  9:00 AM   Post-Procedure Width (cm) 0.3 cm 2020  9:00 AM   Post-Procedure Depth (cm) 0.2 cm 2020  9:00 AM   Post-Procedure Surface Area (cm^2) 0.15 cm^2 2020  9:00 AM   Post-Procedure Volume (cm^3) 0.03 cm^3 2020  9:00 AM   Wound Healing % 98 2020  9:00 AM   Wound Bed Slough (%) 90 % 2020  8:30 AM   Wound Bed Granulation (%) - Post-Procedure 30 % 3/23/2020  8:30 AM   Tunneling (cm) 0 cm 2020  9:00 AM   Undermining (cm) 0 cm 2020  9:00 AM   Wound Odor None 2020  9:00 AM   Pulses Right;2+ 2020  9:00 AM   Right Foot Monofilament 10-point exam (Sensate) 910 3/16/2020 10:00 AM   Left Foot Monofilament 10-point exam (Sensate) 10/10 3/16/2020 10:00 AM   Exposed Structures None 2020  9:00 AM            PROCEDURE:   -2% viscous lidocaine applied topically to wound bed for approximately 5 minutes prior to debridement  -Forceps/curette used to debride wound bed.  Excisional debridement was performed to remove devitalized tissue until healthy, bleeding tissue was visualized.   Entire surface of wound,  0.15cm2 debrided.  Tissue debrided into the subcutaneous layer.    -Bleeding controlled with manual pressure.    -Wound care completed by wound RN, refer to flowsheet  -Patient tolerated the procedure well, without c/o pain or discomfort.       Pertinent Labs and Diagnostics:    Labs:     A1c:   Lab Results   Component Value Date/Time    HBA1C 6.9 (H) 2020 09:37 AM          IMAGIN2020 DX-foot-complete 3+  Mildly increased soft tissue swelling.     Periarticular erosion most conspicuous about the great toe proximal phalanx which could represent osteomyelitis end your septic arthritis. MRI without and with contrast is recommended for further evaluation.    MRI-foot-with and without dated 2020  IMPRESSION:     First metatarsal and proximal phalanx osteomyelitis with possible necrosis.  First metatarsal-phalangeal effusion from septic arthropathy is not seen, indicating the joint fluid is likely draining through the dorsal soft tissues     Dorsal lateral great toe skin ulceration with probable draining sinus and underlying necrotic tissue interposed between the first and second metatarsal shafts, wrapping under the plantar aspect of the proximal first metatarsal shaft     Medial forefoot cellulitis, myositis     Mild midfoot osteoarthritis    VASCULAR STUDIES: N/A    LAST BLOOD CULTURE:  DATE : 2/5/2020   Growth detected by Bactec instrument. 02/06/2020  18:16Abnormal     Culture Result Abnormal    Staphylococcus aureus   See previous culture for sensitivity report.                  ASSESSMENT AND PLAN:   1. History of amputation of right great toe (HCC)  -Patient amputation site is nearly healed with only a small area remaining has overlying dried Scab/collagen.  We will use honey to check a slightly moisture wound bed tomorrow granulation tissue formation.  -Excisional debridement of wound in clinic today, medically necessary to promote wound healing.  -Patient to return to clinic weekly for assessment and debridement  -Patient to change dressing 1 time per week in between clinic visits.     Wound Care: Honey gel, Hydrofiber, nonadhesive foam, Hypafix tape, Tubigrip D    2. Wound infection  -No signs of symptom infection at this clinic visit  -Continue to monitor for signs symptoms of infection      3. Type 2 diabetes mellitus with other specified complication, without long-term current use of insulin ([facility])  Instructed patient to keep tight control over FBS, <140 for optimal wound healing; Implications of loss of protective sensation (LOPS) discussed with patient, including increased risk for amputation.  Advised to check feet at least daily, moisturize feet, and to always wear protective foot wear, arrange meticulous regular foot care by podiatrist or [facility]. Pt with good understanding.   Patient  reports that his blood sugar this morning was 101    PATIENT EDUCATION  - Importance of adequate nutrition for wound healing  -Advised to go to ER for any increased redness, swelling, drainage, or odor, or if patient develops fever, chills, nausea or vomiting.       Please note that this note may have been created using voice recognition software. I have worked with technical experts from [facility] to optimize the interface.  I have made every reasonable attempt to correct obvious errors, but there may be errors of grammar and possibly content that I did not discover before finalizing the note.    N

## 2020-04-21 NOTE — PATIENT INSTRUCTIONS
Should you experience any significant changes in your wound(s) such as infection (redness, swelling, localized heat, increased pain, fever >101 F, chills) or have any questions regarding your home care instructions, please contact the wound center [phone]. If after hours, contact your primary care physician or go the hospital emergency room.  Keep dressing clean and dry and cover while bathing. Only change dressing if over saturated, soiled or its falling off.

## 2020-04-28 ENCOUNTER — OFFICE VISIT (OUTPATIENT)
Dept: WOUND CARE | Facility: MEDICAL CENTER | Age: 55
End: 2020-04-28
Attending: NURSE PRACTITIONER
Payer: COMMERCIAL

## 2020-04-28 ENCOUNTER — HOSPITAL ENCOUNTER (OUTPATIENT)
Dept: LAB | Facility: MEDICAL CENTER | Age: 55
End: 2020-04-28
Attending: FAMILY MEDICINE
Payer: COMMERCIAL

## 2020-04-28 VITALS
DIASTOLIC BLOOD PRESSURE: 89 MMHG | SYSTOLIC BLOOD PRESSURE: 133 MMHG | TEMPERATURE: 97.7 F | RESPIRATION RATE: 12 BRPM | HEART RATE: 88 BPM | OXYGEN SATURATION: 98 %

## 2020-04-28 LAB
CHOLEST SERPL-MCNC: 119 MG/DL (ref 100–199)
FASTING STATUS PATIENT QL REPORTED: NORMAL
HDLC SERPL-MCNC: 42 MG/DL
LDLC SERPL CALC-MCNC: 48 MG/DL
TRIGL SERPL-MCNC: 147 MG/DL (ref 0–149)

## 2020-04-28 PROCEDURE — 99213 OFFICE O/P EST LOW 20 MIN: CPT

## 2020-04-28 PROCEDURE — 99213 OFFICE O/P EST LOW 20 MIN: CPT | Performed by: NURSE PRACTITIONER

## 2020-04-28 PROCEDURE — 80061 LIPID PANEL: CPT

## 2020-04-28 PROCEDURE — 36415 COLL VENOUS BLD VENIPUNCTURE: CPT

## 2020-04-28 PROCEDURE — 83036 HEMOGLOBIN GLYCOSYLATED A1C: CPT

## 2020-04-28 ASSESSMENT — ENCOUNTER SYMPTOMS
MYALGIAS: 0
FEVER: 0
VOMITING: 0
PALPITATIONS: 0
SPUTUM PRODUCTION: 0
SHORTNESS OF BREATH: 0
WHEEZING: 0
DIZZINESS: 0
NAUSEA: 0
COUGH: 0
CHILLS: 0
WEAKNESS: 0

## 2020-04-28 ASSESSMENT — PAIN SCALES - GENERAL: PAINLEVEL: NO PAIN

## 2020-04-28 NOTE — PROGRESS NOTES
Provider Encounter- Full Thickness wound    HISTORY OF PRESENT ILLNESS  Wound History:    START OF CARE IN CLINIC: 3/16/2020    REFERRING PROVIDER: JOSH Duenas     WOUND- Full Thickness Wound   LOCATION: Right first ray   HISTORY: Patient reports that he was jogging on 01/17/2020 when he sustained a fall.  3 days later he noticed increasing pain, erythema, swelling and development of an open wound.  He presented to the emergency department where an x-ray was performed which showed no evidence of fracture.  Patient was discharged home with some pain medication.  Patient reports that even though he had pain medication the pain continued with worsening erythema, swelling and new purulent drainage.  He subsequently represented to the ED again on 2/3/2020 with worsening right foot pain.  During this admission he was found to have osteomyelitis of the distal first metatarsal and proximal phalanx with abscess/extension of soft tissue involvement. At that time he was determined to be septic with bacteremia, cultures taken were positive for MSSA.  On 02/05/2020 patient underwent amputation and I&D by Dr. Randall.  A PICC line was placed and per IDs recommendations patient was to continue on IV cefazolin for 6 weeks.  Patient was transferred to a SNF for further treatment of the wound and IV antibiotic administration.  The patient was referred to [facility] for wound management.    Pertinent Medical History: Obesity, hyperlipidemia, diabetes mellitus, retinal detachment    TOBACCO USE: Never smoked or use smokeless tobacco    Patient's problem list, allergies, and current medications reviewed and updated in Epic    Interval History:  3/22/2020: Clinic visit with JOSH Marques. Patient states that they are feeling well today.  Patient denies fever, chills, nausea, vomiting, lightheadedness, dizziness, shortness of breath and chest pain.  Patient presents with small scabbed over wound bed.  Scab was removed to  reveal some fibrous tissue underneath.  Overall the wound is healthy and healing appropriately.    3/30/2020: Clinic visit with JOSH Marques. Patient states that they are feeling well today.  Patient denies fever, chills, nausea, vomiting, lightheadedness, dizziness, shortness of breath and chest pain.  Patient had collagen crusted over wound bed which was removed to reveal the wound was relatively the same size.    4/6/2020: Clinic visit with JOSH Marques. Patient states that they are feeling well today.  Patient denies fever, chills, nausea, vomiting, lightheadedness, dizziness, shortness of breath and chest pain.  Patient's wound is minimally decreased in size.  We will have the patient change the dressing after 4 days he has not had any significant drainage.  Just want the patient to keep the wound bed moist and the surrounding tissue protected with zinc paste.  Patient to apply saline to collagen dressing as well as over the area of Hydrofiber covering the collagen and no where else.    4/21/2020: Clinic visit with JOSH Marques. Patient states that they are feeling well today.  Patient denies fever, chills, nausea, vomiting, lightheadedness, dizziness, shortness of breath and chest pain.  Wound bed is a little dry this clinic visit.  We will hold collagen and place small amount of honey to wound bed to help promote moisture to wound bed and granulation tissue formation.    4/28/2020: Clinic visit with JOSH Marques. Patient states that they are feeling well today.  Patient denies fever, chills, nausea, vomiting, lightheadedness, dizziness, shortness of breath and chest pain.  Patient's wound is 100% epithelialized patient will be discharged from [facility] at this time.    REVIEW OF SYSTEMS:   Review of Systems   Constitutional: Negative for chills and fever.   Respiratory: Negative for cough, sputum production, shortness of breath and wheezing.    Cardiovascular: Negative for  chest pain, palpitations and leg swelling.   Gastrointestinal: Negative for nausea and vomiting.   Musculoskeletal: Negative for myalgias.   Skin: Negative for itching and rash.   Neurological: Negative for dizziness and weakness.       PHYSICAL EXAMINATION:   /89   Pulse 88   Temp 36.5 °C (97.7 °F)   Resp 12   SpO2 98%     Physical Exam   Constitutional: He is oriented to person, place, and time and well-developed, well-nourished, and in no distress.   HENT:   Head: Normocephalic and atraumatic.   Eyes: Pupils are equal, round, and reactive to light.   Neck: Normal range of motion.   Cardiovascular: Intact distal pulses.   Pulmonary/Chest: Effort normal. No respiratory distress. He has no wheezes.   Musculoskeletal: Normal range of motion.   Neurological: He is alert and oriented to person, place, and time.   Skin: Skin is warm. No rash noted. No erythema.   Right first ray amputation  See doc flowsheets   Psychiatric: Mood, memory, affect and judgment normal.       WOUND ASSESSMENT        Wound 03/16/20 Diabetic Ulcer Right great toe amputation site (Active)   Wound Image   4/28/2020  2:00 PM   Site Assessment Epithelialization 4/28/2020  2:00 PM   Periwound Assessment Scar tissue 4/28/2020  2:00 PM   Margins Attached edges 4/21/2020  9:00 AM   Closure Secondary intention 4/13/2020  8:30 AM   Drainage Amount None 4/28/2020  2:00 PM   Drainage Description Serosanguineous 4/21/2020  9:00 AM   Treatments Cleansed 4/28/2020  2:00 PM   Wound Cleansing [place] 4/21/2020  9:00 AM   Periwound Protectant Skin Protectant Wipes to Periwound 4/21/2020  9:00 AM   Dressing Cleansing/Solutions Normal Saline 4/21/2020  9:00 AM   Dressing Options Nonadhesive Foam;Hypafix Tape 4/28/2020  2:00 PM   Dressing Changed Changed 4/28/2020  2:00 PM   Dressing Status Clean;Dry;Intact 4/28/2020  2:00 PM   Dressing Change/Treatment Frequency Every 72 hrs, and As Needed 4/21/2020  9:00 AM   Wound Length (cm) 0.4 cm 4/21/2020   9:00 AM   Wound Width (cm) 0.1 cm 2020  9:00 AM   Wound Depth (cm) 0.2 cm 2020  9:00 AM   Wound Surface Area (cm^2) 0.04 cm^2 2020  9:00 AM   Wound Volume (cm^3) 0.01 cm^3 2020  9:00 AM   Post-Procedure Length (cm) 0.5 cm 2020  9:00 AM   Post-Procedure Width (cm) 0.3 cm 2020  9:00 AM   Post-Procedure Depth (cm) 0.2 cm 2020  9:00 AM   Post-Procedure Surface Area (cm^2) 0.15 cm^2 2020  9:00 AM   Post-Procedure Volume (cm^3) 0.03 cm^3 2020  9:00 AM   Wound Healing % 98 2020  9:00 AM   Wound Bed Slough (%) 90 % 2020  8:30 AM   Wound Bed Granulation (%) - Post-Procedure 30 % 3/23/2020  8:30 AM   Tunneling (cm) 0 cm 2020  2:00 PM   Undermining (cm) 0 cm 2020  2:00 PM   Wound Odor None 2020  2:00 PM   Pulses Right;2+ 2020  2:00 PM   Right Foot Monofilament 10-point exam (Sensate) 9/10 3/16/2020 10:00 AM   Left Foot Monofilament 10-point exam (Sensate) 10/10 3/16/2020 10:00 AM   Exposed Structures None 2020  2:00 PM            PROCEDURE:   Patient's wound has 100% epithelialized.  Patient educated to keep cover dressing on wound for the next 2 weeks until new epithelialized tissue becomes stronger.  Patient be discharged from [facility] at this time      Pertinent Labs and Diagnostics:    Labs:     A1c:   Lab Results   Component Value Date/Time    HBA1C 6.9 (H) 2020 09:37 AM          IMAGIN2020 DX-foot-complete 3+  Mildly increased soft tissue swelling.     Periarticular erosion most conspicuous about the great toe proximal phalanx which could represent osteomyelitis end your septic arthritis. MRI without and with contrast is recommended for further evaluation.    MRI-foot-with and without dated 2020  IMPRESSION:     First metatarsal and proximal phalanx osteomyelitis with possible necrosis. First metatarsal-phalangeal effusion from septic arthropathy is not seen, indicating the joint fluid is likely draining through the dorsal soft  tissues     Dorsal lateral great toe skin ulceration with probable draining sinus and underlying necrotic tissue interposed between the first and second metatarsal shafts, wrapping under the plantar aspect of the proximal first metatarsal shaft     Medial forefoot cellulitis, myositis     Mild midfoot osteoarthritis    VASCULAR STUDIES: N/A    LAST BLOOD CULTURE:  DATE : 2/5/2020   Growth detected by Bactec instrument. 02/06/2020  18:16Abnormal     Culture Result Abnormal    Staphylococcus aureus   See previous culture for sensitivity report.                  ASSESSMENT AND PLAN:   1. History of amputation of right great toe (HCC)  -Resolved     Wound Care:     2. Wound infection  -No signs of symptom infection at this clinic visit        3. Type 2 diabetes mellitus with other specified complication, without long-term current use of insulin (HCC)  Implications of loss of protective sensation (LOPS) discussed with patient, including increased risk for amputation.  Advised to check feet at least daily, moisturize feet, and to always wear protective foot wear, arrange meticulous regular foot care by podiatrist or [facility]. Pt with good understanding.       PATIENT EDUCATION  - Importance of adequate nutrition for wound healing  -Advised to go to ER for any increased redness, swelling, drainage, or odor, or if patient develops fever, chills, nausea or vomiting.     >15 min spent face to face with patient, >50% of time spent counseling, coordinating care, reviewing records, discussing POC, educating patient      Please note that this note may have been created using voice recognition software. I have worked with technical experts from [employer] to optimize the interface.  I have made every reasonable attempt to correct obvious errors, but there may be errors of grammar and possibly content that I did not discover before finalizing the note.    N

## 2020-04-29 LAB
EST. AVERAGE GLUCOSE BLD GHB EST-MCNC: 131 MG/DL
HBA1C MFR BLD: 6.2 % (ref 0–5.6)

## 2020-06-19 ENCOUNTER — OFFICE VISIT (OUTPATIENT)
Dept: URGENT CARE | Facility: PHYSICIAN GROUP | Age: 55
End: 2020-06-19
Payer: COMMERCIAL

## 2020-06-19 VITALS
HEIGHT: 64 IN | SYSTOLIC BLOOD PRESSURE: 122 MMHG | HEART RATE: 102 BPM | BODY MASS INDEX: 28.85 KG/M2 | WEIGHT: 169 LBS | RESPIRATION RATE: 16 BRPM | OXYGEN SATURATION: 98 % | DIASTOLIC BLOOD PRESSURE: 82 MMHG | TEMPERATURE: 97.7 F

## 2020-06-19 DIAGNOSIS — L08.9 SKIN INFECTION: ICD-10-CM

## 2020-06-19 PROCEDURE — 99213 OFFICE O/P EST LOW 20 MIN: CPT | Performed by: PHYSICIAN ASSISTANT

## 2020-06-19 RX ORDER — DOXYCYCLINE HYCLATE 100 MG
100 TABLET ORAL 2 TIMES DAILY
Qty: 20 TAB | Refills: 0 | Status: SHIPPED | OUTPATIENT
Start: 2020-06-19 | End: 2020-06-29

## 2020-06-19 RX ORDER — CEPHALEXIN 500 MG/1
500 CAPSULE ORAL 4 TIMES DAILY
Qty: 28 CAP | Refills: 0 | Status: SHIPPED | OUTPATIENT
Start: 2020-06-19 | End: 2020-06-26

## 2020-06-19 ASSESSMENT — ENCOUNTER SYMPTOMS
ABDOMINAL PAIN: 0
CONSTIPATION: 0
MYALGIAS: 0
FEVER: 0
COUGH: 0
VOMITING: 0
SORE THROAT: 0
HEADACHES: 0
NAUSEA: 0
SHORTNESS OF BREATH: 0
CHILLS: 0
DIARRHEA: 0
EYE PAIN: 0

## 2020-06-19 ASSESSMENT — FIBROSIS 4 INDEX: FIB4 SCORE: 0.99

## 2020-06-20 NOTE — PROGRESS NOTES
"Subjective:   Rob Webster is a 55 y.o. male who presents for Spider Bite (R side cheek and chin, itchy, two bumps swelling, x5 days )      This is a pleasant 55-year-old male who takes metformin for diabetes and presents to urgent care complaining of 2 lesions on the right side of his face and neck which he incurred around 5 days ago.  The patient reports that he rolled over in bed and felt a pain on the right side of his face which woke him up and then he felt another pain on the right side of his face and swatted what he believes was a spider away.  Over the last 5 days both lesions have been increasing in size and have become more itchy.  The patient states that he has been applying a hydrocortisone cream with minimal relief.  He denies any other constitutional symptoms including fever, body aches, arthralgias and states he feels normal except for the annoying itching lesion on his neck.      Review of Systems   Constitutional: Negative for chills and fever.   HENT: Negative for congestion, ear pain and sore throat.    Eyes: Negative for pain.   Respiratory: Negative for cough and shortness of breath.    Cardiovascular: Negative for chest pain.   Gastrointestinal: Negative for abdominal pain, constipation, diarrhea, nausea and vomiting.   Genitourinary: Negative for dysuria.   Musculoskeletal: Negative for myalgias.   Skin: Positive for itching and rash.   Neurological: Negative for headaches.       Medications, Allergies, and current problem list reviewed today in Epic.     Objective:     /82 (BP Location: Left arm, Patient Position: Sitting, BP Cuff Size: Adult)   Pulse (!) 102   Temp 36.5 °C (97.7 °F) (Temporal)   Resp 16   Ht 1.626 m (5' 4\")   Wt 76.7 kg (169 lb)   SpO2 98%     Physical Exam  Vitals signs reviewed.   Constitutional:       Appearance: Normal appearance.   HENT:      Right Ear: External ear normal.      Left Ear: External ear normal.      Nose: Nose normal.   " Mouth/Throat:      Mouth: Mucous membranes are moist.   Eyes:      Conjunctiva/sclera: Conjunctivae normal.   Neck:      Comments: On the right side along the mandibular line as well as below the mandible there are 2 lesions.  The right size mandibular lesion is around 1 cm with a central eschar.  There is induration underlying the erythema and very minimal fluctuance at the very tip.  The larger lesion below the neck is around 5 cm in diameter and there is significant underlying induration.  There are 2 areas of eschar and lots of erythema and mild warmth.  There is lymphadenopathy surrounding these 2 lesions.  Please see picture below.  Cardiovascular:      Rate and Rhythm: Normal rate.   Pulmonary:      Effort: Pulmonary effort is normal.   Skin:     General: Skin is warm and dry.      Capillary Refill: Capillary refill takes less than 2 seconds.   Neurological:      Mental Status: He is alert and oriented to person, place, and time.       Images taken at today's visit:      Assessment/Plan:     Diagnosis and associated orders:     1. Skin infection  cephALEXin (KEFLEX) 500 MG Cap    doxycycline (VIBRAMYCIN) 100 MG Tab      Comments/MDM:     Using shared decision making I talked to the patient about incision and drainage versus antibiotic therapy and we both agree it is reasonable to try 2 days of antibiotic therapy to reduce the inflammation and he will return in 48 hours to see if it is improving, amenable for incision and drainage, or what other intervention is necessary next.  I counseled him on wound care and told him to pretty much leave the wound alone except for using very gentle facial cleanser.  Told him that if he develops any symptoms concerning for infection including fever, malaise, myalgias that he is to go to the ER for IV antibiotics.  He was very appreciative of this plan and said he would return on Sunday.       Differential diagnosis, natural history, supportive care, and indications for  immediate follow-up discussed.    Advised the patient to follow-up with the primary care physician for recheck, reevaluation, and consideration of further management.    Please note that this dictation was created using voice recognition software. I have made a reasonable attempt to correct obvious errors, but I expect that there are errors of grammar and possibly content that I did not discover before finalizing the note.    This note was electronically signed by Lei Jameson PA-C

## 2020-06-21 ENCOUNTER — OFFICE VISIT (OUTPATIENT)
Dept: URGENT CARE | Facility: PHYSICIAN GROUP | Age: 55
End: 2020-06-21
Payer: COMMERCIAL

## 2020-06-21 ENCOUNTER — HOSPITAL ENCOUNTER (OUTPATIENT)
Facility: MEDICAL CENTER | Age: 55
End: 2020-06-21
Attending: PHYSICIAN ASSISTANT
Payer: COMMERCIAL

## 2020-06-21 VITALS
TEMPERATURE: 98 F | DIASTOLIC BLOOD PRESSURE: 78 MMHG | HEIGHT: 64 IN | HEART RATE: 92 BPM | SYSTOLIC BLOOD PRESSURE: 126 MMHG | WEIGHT: 185.2 LBS | BODY MASS INDEX: 31.62 KG/M2 | OXYGEN SATURATION: 96 % | RESPIRATION RATE: 16 BRPM

## 2020-06-21 DIAGNOSIS — L08.9 SKIN INFECTION: ICD-10-CM

## 2020-06-21 PROCEDURE — 87186 SC STD MICRODIL/AGAR DIL: CPT

## 2020-06-21 PROCEDURE — 87077 CULTURE AEROBIC IDENTIFY: CPT

## 2020-06-21 PROCEDURE — 87070 CULTURE OTHR SPECIMN AEROBIC: CPT

## 2020-06-21 PROCEDURE — 87205 SMEAR GRAM STAIN: CPT

## 2020-06-21 PROCEDURE — 99213 OFFICE O/P EST LOW 20 MIN: CPT | Performed by: PHYSICIAN ASSISTANT

## 2020-06-21 ASSESSMENT — ENCOUNTER SYMPTOMS
CHILLS: 0
FEVER: 0
ROS SKIN COMMENTS: LESIONS

## 2020-06-21 ASSESSMENT — FIBROSIS 4 INDEX: FIB4 SCORE: 0.99

## 2020-06-21 NOTE — PROGRESS NOTES
Subjective:   Rob Webster is a 55 y.o. male who presents today with   Chief Complaint   Patient presents with   • Wound Check     poss bug bite, bite located on R jaw, pt states that they are feeling  better, swelling less now, x6 days since poss bite        Wound Check   He was originally treated 5 to 10 days ago. Previous treatment included oral antibiotics. His temperature was unmeasured prior to arrival. There has been no drainage from the wound. The redness has improved. The swelling has improved. The pain has improved.     Patient notes significant improvement of pain and swelling. No systemic symptoms. S/p spider bite.  PMH:  has a past medical history of Glaucoma (5/23/2012).  MEDS:   Current Outpatient Medications:   •  Homeopathic Products (PROSACEA) Gel, by Apply externally route., Disp: , Rfl:   •  cephALEXin (KEFLEX) 500 MG Cap, Take 1 Cap by mouth 4 times a day for 7 days., Disp: 28 Cap, Rfl: 0  •  doxycycline (VIBRAMYCIN) 100 MG Tab, Take 1 Tab by mouth 2 times a day for 10 days., Disp: 20 Tab, Rfl: 0  •  Lancets (ONETOUCH DELICA PLUS [id number]) Misc, , Disp: , Rfl:   •  Blood Glucose Monitoring Suppl (ONE TOUCH ULTRA 2) w/Device Kit, , Disp: , Rfl:   •  ONE TOUCH ULTRA TEST strip, , Disp: , Rfl:   •  Multiple Vitamins-Minerals (CENTRUM SILVER ADULT 50+ PO), Take  by mouth., Disp: , Rfl:   •  Specialty Vitamins Products (PROSTATE) Tab, Take 1 Tab by mouth See Admin Instructions. 3x/week - supplement, Disp: , Rfl:   •  metFORMIN (GLUCOPHAGE) 1000 MG tablet, Take 1 Tab by mouth 2 times a day, with meals., Disp: 60 Tab, Rfl: 2  •  insulin glargine (LANTUS SOLOSTAR) 100 UNIT/ML Solution Pen-injector injection, Inject 15 Units as instructed every evening., Disp: 1 PEN, Rfl: 3  •  insulin regular (HUMULIN R) 100 Unit/mL Solution, Inject 5 Units as instructed 3 times a day before meals., Disp: 10 mL, Rfl: 1  •  lisinopril (PRINIVIL) 10 MG Tab, Take 1 Tab by mouth every day., Disp: 30 Tab, Rfl:  "1  •  atorvastatin (LIPITOR) 40 MG Tab, Take 40 mg by mouth every day., Disp: , Rfl:   •  therapeutic multivitamin-minerals (THERAGRAN-M) Tab, Take 1 Tab by mouth every day., Disp: , Rfl:   ALLERGIES: No Known Allergies  SURGHX:   Past Surgical History:   Procedure Laterality Date   • INCISION AND DRAINAGE ORTHOPEDIC Right 2/7/2020    Procedure: INCISION AND DRAINAGE, WOUND, BY ORTHOPEDICS;  Surgeon: Juan Randall;  Location: SURGERY [facility];  Service: Orthopedics   • WOUND CLOSURE ORTHO Right 2/7/2020    Procedure: CLOSURE, WOUND, ORTHO;  Surgeon: Juan Randall;  Location: SURGERY [facility];  Service: Orthopedics   • IRRIGATION & DEBRIDEMENT ORTHO Right 2/5/2020    Procedure: IRRIGATION AND DEBRIDEMENT, With  WOUND VAC;  Surgeon: Juan Randall;  Location: SURGERY [facility];  Service: Orthopedics   • TOE AMPUTATION Right 2/5/2020    Procedure: AMPUTATION, TOE- RIGHT GREAT TOE;  Surgeon: Juan Randall;  Location: SURGERY [facility];  Service: Orthopedics     SOCHX:  reports that he has never smoked. He has never used smokeless tobacco. He reports previous alcohol use. He reports that he does not use drugs.  FH: Reviewed with patient, not pertinent to this visit.       Review of Systems   Constitutional: Negative for chills and fever.   Skin:        lesions   All other systems reviewed and are negative.       Objective:   /78 (BP Location: Left arm, Patient Position: Sitting, BP Cuff Size: Adult)   Pulse 92   Temp 36.7 °C (98 °F) (Temporal)   Resp 16   Ht 1.626 m (5' 4\")   Wt 84 kg (185 lb 3.2 oz)   SpO2 96%   BMI 31.79 kg/m²   Physical Exam  Vitals signs and nursing note reviewed.   Constitutional:       General: He is not in acute distress.     Appearance: Normal appearance. He is well-developed and normal weight. He is not ill-appearing or toxic-appearing.   HENT:      Head: Normocephalic and atraumatic.        Right Ear: Hearing normal.      " Left Ear: Hearing normal.   Cardiovascular:      Rate and Rhythm: Normal rate and regular rhythm.      Heart sounds: Normal heart sounds.   Pulmonary:      Effort: Pulmonary effort is normal.   Musculoskeletal:      Comments: Normal movement in all 4 extremities   Lymphadenopathy:      Head:      Right side of head: Submandibular adenopathy present. No tonsillar adenopathy.      Left side of head: No submandibular adenopathy.   Skin:     General: Skin is warm and dry.      Comments: 2 lesions to right mandibular area. Larger of the two below the mandible is approximately 2.5cm with no surrounding erythema or swelling. Lesions do have some central eschar and are indurated. Smaller lesion measure 0.5cm. Larger wound does have area of ulceration.    Neurological:      Mental Status: He is alert.      Coordination: Coordination normal.   Psychiatric:         Mood and Affect: Mood normal.         Area was cleaned and 18 gauge needle was advanced at border of lesions until purulent pus was expressed. Small amount of pus expressed and wound culture collected. Abx ointment and dressing placed today.   Assessment/Plan:   Assessment    1. Skin infection  - CULTURE WOUND W/ GRAM STAIN; Future  Patient encouraged to follow up in 2 days for another wound recheck. Reiterated wound care and to keep the area covered. Continue monitoring for worsening erythema, swelling, fever or chills to return sooner or go to the ER for IV antibiotics.   Differential diagnosis, natural history, supportive care, and indications for immediate follow-up discussed.   Patient given instructions and understanding of medications and treatment.        Patient agreeable to plan.      Please note that this dictation was created using voice recognition software. I have made every reasonable attempt to correct obvious errors, but I expect that there are errors of grammar and possibly content that I did not discover before finalizing the note.    Ash Osman  MARY

## 2020-06-22 DIAGNOSIS — L08.9 SKIN INFECTION: ICD-10-CM

## 2020-06-23 LAB
GRAM STN SPEC: NORMAL
SIGNIFICANT IND 70042: NORMAL
SITE SITE: NORMAL
SOURCE SOURCE: NORMAL

## 2020-06-24 ENCOUNTER — TELEPHONE (OUTPATIENT)
Dept: MEDICAL GROUP | Facility: PHYSICIAN GROUP | Age: 55
End: 2020-06-24

## 2020-06-24 LAB
BACTERIA WND AEROBE CULT: ABNORMAL
BACTERIA WND AEROBE CULT: ABNORMAL
GRAM STN SPEC: ABNORMAL
SIGNIFICANT IND 70042: ABNORMAL
SITE SITE: ABNORMAL
SOURCE SOURCE: ABNORMAL

## 2020-06-24 NOTE — TELEPHONE ENCOUNTER
Samir from [facility] called and stated that pts' wound culture came back positive for MRSA. Please advise.

## 2020-06-25 ENCOUNTER — TELEPHONE (OUTPATIENT)
Dept: URGENT CARE | Facility: PHYSICIAN GROUP | Age: 55
End: 2020-06-25

## 2020-08-08 ENCOUNTER — HOSPITAL ENCOUNTER (OUTPATIENT)
Dept: LAB | Facility: MEDICAL CENTER | Age: 55
End: 2020-08-08
Attending: FAMILY MEDICINE
Payer: COMMERCIAL

## 2020-08-08 LAB
ALBUMIN SERPL BCP-MCNC: 4.6 G/DL (ref 3.2–4.9)
ALBUMIN/GLOB SERPL: 1.8 G/DL
ALP SERPL-CCNC: 65 U/L (ref 30–99)
ALT SERPL-CCNC: 32 U/L (ref 2–50)
ANION GAP SERPL CALC-SCNC: 14 MMOL/L (ref 7–16)
AST SERPL-CCNC: 25 U/L (ref 12–45)
BASOPHILS # BLD AUTO: 0.9 % (ref 0–1.8)
BASOPHILS # BLD: 0.06 K/UL (ref 0–0.12)
BILIRUB SERPL-MCNC: 0.7 MG/DL (ref 0.1–1.5)
BUN SERPL-MCNC: 14 MG/DL (ref 8–22)
CALCIUM SERPL-MCNC: 9.1 MG/DL (ref 8.5–10.5)
CHLORIDE SERPL-SCNC: 103 MMOL/L (ref 96–112)
CHOLEST SERPL-MCNC: 122 MG/DL (ref 100–199)
CO2 SERPL-SCNC: 22 MMOL/L (ref 20–33)
CREAT SERPL-MCNC: 0.7 MG/DL (ref 0.5–1.4)
EOSINOPHIL # BLD AUTO: 0.24 K/UL (ref 0–0.51)
EOSINOPHIL NFR BLD: 3.5 % (ref 0–6.9)
ERYTHROCYTE [DISTWIDTH] IN BLOOD BY AUTOMATED COUNT: 41.7 FL (ref 35.9–50)
FASTING STATUS PATIENT QL REPORTED: NORMAL
GLOBULIN SER CALC-MCNC: 2.6 G/DL (ref 1.9–3.5)
GLUCOSE SERPL-MCNC: 141 MG/DL (ref 65–99)
HCT VFR BLD AUTO: 44.9 % (ref 42–52)
HDLC SERPL-MCNC: 47 MG/DL
HGB BLD-MCNC: 15.5 G/DL (ref 14–18)
IMM GRANULOCYTES # BLD AUTO: 0.04 K/UL (ref 0–0.11)
IMM GRANULOCYTES NFR BLD AUTO: 0.6 % (ref 0–0.9)
LDLC SERPL CALC-MCNC: 46 MG/DL
LYMPHOCYTES # BLD AUTO: 1.68 K/UL (ref 1–4.8)
LYMPHOCYTES NFR BLD: 24.7 % (ref 22–41)
MCH RBC QN AUTO: 28.8 PG (ref 27–33)
MCHC RBC AUTO-ENTMCNC: 34.5 G/DL (ref 33.7–35.3)
MCV RBC AUTO: 83.5 FL (ref 81.4–97.8)
MONOCYTES # BLD AUTO: 0.54 K/UL (ref 0–0.85)
MONOCYTES NFR BLD AUTO: 8 % (ref 0–13.4)
NEUTROPHILS # BLD AUTO: 4.23 K/UL (ref 1.82–7.42)
NEUTROPHILS NFR BLD: 62.3 % (ref 44–72)
NRBC # BLD AUTO: 0 K/UL
NRBC BLD-RTO: 0 /100 WBC
PLATELET # BLD AUTO: 244 K/UL (ref 164–446)
PMV BLD AUTO: 10.8 FL (ref 9–12.9)
POTASSIUM SERPL-SCNC: 3.8 MMOL/L (ref 3.6–5.5)
PROT SERPL-MCNC: 7.2 G/DL (ref 6–8.2)
RBC # BLD AUTO: 5.38 M/UL (ref 4.7–6.1)
SODIUM SERPL-SCNC: 139 MMOL/L (ref 135–145)
TRIGL SERPL-MCNC: 144 MG/DL (ref 0–149)
WBC # BLD AUTO: 6.8 K/UL (ref 4.8–10.8)

## 2020-08-08 PROCEDURE — 83036 HEMOGLOBIN GLYCOSYLATED A1C: CPT

## 2020-08-08 PROCEDURE — 80061 LIPID PANEL: CPT

## 2020-08-08 PROCEDURE — 85025 COMPLETE CBC W/AUTO DIFF WBC: CPT

## 2020-08-08 PROCEDURE — 36415 COLL VENOUS BLD VENIPUNCTURE: CPT

## 2020-08-08 PROCEDURE — 80053 COMPREHEN METABOLIC PANEL: CPT

## 2020-08-13 LAB
EST. AVERAGE GLUCOSE BLD GHB EST-MCNC: 134 MG/DL
HBA1C MFR BLD: 6.3 % (ref 0–5.6)

## 2020-11-07 ENCOUNTER — HOSPITAL ENCOUNTER (OUTPATIENT)
Dept: LAB | Facility: MEDICAL CENTER | Age: 55
End: 2020-11-07
Attending: FAMILY MEDICINE
Payer: COMMERCIAL

## 2020-11-07 LAB
CHOLEST SERPL-MCNC: 186 MG/DL (ref 100–199)
CREAT UR-MCNC: 50.01 MG/DL
EST. AVERAGE GLUCOSE BLD GHB EST-MCNC: 163 MG/DL
HBA1C MFR BLD: 7.3 % (ref 0–5.6)
HDLC SERPL-MCNC: 42 MG/DL
LDLC SERPL CALC-MCNC: 91 MG/DL
MICROALBUMIN UR-MCNC: <1.2 MG/DL
MICROALBUMIN/CREAT UR: NORMAL MG/G (ref 0–30)
TRIGL SERPL-MCNC: 266 MG/DL (ref 0–149)

## 2020-11-07 PROCEDURE — 82570 ASSAY OF URINE CREATININE: CPT

## 2020-11-07 PROCEDURE — 83036 HEMOGLOBIN GLYCOSYLATED A1C: CPT

## 2020-11-07 PROCEDURE — 82043 UR ALBUMIN QUANTITATIVE: CPT

## 2020-11-07 PROCEDURE — 36415 COLL VENOUS BLD VENIPUNCTURE: CPT

## 2020-11-07 PROCEDURE — 80061 LIPID PANEL: CPT

## 2021-02-06 ENCOUNTER — HOSPITAL ENCOUNTER (OUTPATIENT)
Dept: LAB | Facility: MEDICAL CENTER | Age: 56
End: 2021-02-06
Attending: FAMILY MEDICINE
Payer: COMMERCIAL

## 2021-02-06 LAB
CHOLEST SERPL-MCNC: 103 MG/DL (ref 100–199)
EST. AVERAGE GLUCOSE BLD GHB EST-MCNC: 189 MG/DL
FASTING STATUS PATIENT QL REPORTED: NORMAL
HBA1C MFR BLD: 8.2 % (ref 0–5.6)
HDLC SERPL-MCNC: 45 MG/DL
LDLC SERPL CALC-MCNC: 43 MG/DL
TRIGL SERPL-MCNC: 74 MG/DL (ref 0–149)

## 2021-02-06 PROCEDURE — 80061 LIPID PANEL: CPT

## 2021-02-06 PROCEDURE — 36415 COLL VENOUS BLD VENIPUNCTURE: CPT

## 2021-02-06 PROCEDURE — 83036 HEMOGLOBIN GLYCOSYLATED A1C: CPT

## 2021-05-08 ENCOUNTER — HOSPITAL ENCOUNTER (OUTPATIENT)
Dept: LAB | Facility: MEDICAL CENTER | Age: 56
End: 2021-05-08
Attending: FAMILY MEDICINE
Payer: COMMERCIAL

## 2021-05-08 LAB
ALBUMIN SERPL BCP-MCNC: 4.2 G/DL (ref 3.2–4.9)
ALBUMIN/GLOB SERPL: 1.4 G/DL
ALP SERPL-CCNC: 76 U/L (ref 30–99)
ALT SERPL-CCNC: 27 U/L (ref 2–50)
ANION GAP SERPL CALC-SCNC: 6 MMOL/L (ref 7–16)
AST SERPL-CCNC: 22 U/L (ref 12–45)
BASOPHILS # BLD AUTO: 1.1 % (ref 0–1.8)
BASOPHILS # BLD: 0.07 K/UL (ref 0–0.12)
BILIRUB SERPL-MCNC: 0.6 MG/DL (ref 0.1–1.5)
BUN SERPL-MCNC: 10 MG/DL (ref 8–22)
CALCIUM SERPL-MCNC: 8.8 MG/DL (ref 8.5–10.5)
CHLORIDE SERPL-SCNC: 104 MMOL/L (ref 96–112)
CHOLEST SERPL-MCNC: 113 MG/DL (ref 100–199)
CO2 SERPL-SCNC: 26 MMOL/L (ref 20–33)
CREAT SERPL-MCNC: 0.7 MG/DL (ref 0.5–1.4)
EOSINOPHIL # BLD AUTO: 0.16 K/UL (ref 0–0.51)
EOSINOPHIL NFR BLD: 2.5 % (ref 0–6.9)
ERYTHROCYTE [DISTWIDTH] IN BLOOD BY AUTOMATED COUNT: 41.6 FL (ref 35.9–50)
EST. AVERAGE GLUCOSE BLD GHB EST-MCNC: 197 MG/DL
GLOBULIN SER CALC-MCNC: 2.9 G/DL (ref 1.9–3.5)
GLUCOSE SERPL-MCNC: 190 MG/DL (ref 65–99)
HBA1C MFR BLD: 8.5 % (ref 4–5.6)
HCT VFR BLD AUTO: 43.8 % (ref 42–52)
HDLC SERPL-MCNC: 37 MG/DL
HGB BLD-MCNC: 14.9 G/DL (ref 14–18)
IMM GRANULOCYTES # BLD AUTO: 0.06 K/UL (ref 0–0.11)
IMM GRANULOCYTES NFR BLD AUTO: 0.9 % (ref 0–0.9)
LDLC SERPL CALC-MCNC: 41 MG/DL
LYMPHOCYTES # BLD AUTO: 1.51 K/UL (ref 1–4.8)
LYMPHOCYTES NFR BLD: 23.6 % (ref 22–41)
MCH RBC QN AUTO: 28.7 PG (ref 27–33)
MCHC RBC AUTO-ENTMCNC: 34 G/DL (ref 33.7–35.3)
MCV RBC AUTO: 84.4 FL (ref 81.4–97.8)
MONOCYTES # BLD AUTO: 0.6 K/UL (ref 0–0.85)
MONOCYTES NFR BLD AUTO: 9.4 % (ref 0–13.4)
NEUTROPHILS # BLD AUTO: 4 K/UL (ref 1.82–7.42)
NEUTROPHILS NFR BLD: 62.5 % (ref 44–72)
NRBC # BLD AUTO: 0 K/UL
NRBC BLD-RTO: 0 /100 WBC
PLATELET # BLD AUTO: 231 K/UL (ref 164–446)
PMV BLD AUTO: 10.3 FL (ref 9–12.9)
POTASSIUM SERPL-SCNC: 4.2 MMOL/L (ref 3.6–5.5)
PROT SERPL-MCNC: 7.1 G/DL (ref 6–8.2)
RBC # BLD AUTO: 5.19 M/UL (ref 4.7–6.1)
SODIUM SERPL-SCNC: 136 MMOL/L (ref 135–145)
TRIGL SERPL-MCNC: 175 MG/DL (ref 0–149)
WBC # BLD AUTO: 6.4 K/UL (ref 4.8–10.8)

## 2021-05-08 PROCEDURE — 36415 COLL VENOUS BLD VENIPUNCTURE: CPT

## 2021-05-08 PROCEDURE — 83036 HEMOGLOBIN GLYCOSYLATED A1C: CPT

## 2021-05-08 PROCEDURE — 80053 COMPREHEN METABOLIC PANEL: CPT

## 2021-05-08 PROCEDURE — 80061 LIPID PANEL: CPT

## 2021-05-08 PROCEDURE — 85025 COMPLETE CBC W/AUTO DIFF WBC: CPT

## 2021-08-14 ENCOUNTER — HOSPITAL ENCOUNTER (OUTPATIENT)
Dept: LAB | Facility: MEDICAL CENTER | Age: 56
End: 2021-08-14
Attending: FAMILY MEDICINE
Payer: COMMERCIAL

## 2021-08-14 LAB
CHOLEST SERPL-MCNC: 148 MG/DL (ref 100–199)
EST. AVERAGE GLUCOSE BLD GHB EST-MCNC: 154 MG/DL
HBA1C MFR BLD: 7 % (ref 4–5.6)
HDLC SERPL-MCNC: 42 MG/DL
LDLC SERPL CALC-MCNC: 79 MG/DL
TRIGL SERPL-MCNC: 137 MG/DL (ref 0–149)

## 2021-08-14 PROCEDURE — 36415 COLL VENOUS BLD VENIPUNCTURE: CPT

## 2021-08-14 PROCEDURE — 80061 LIPID PANEL: CPT

## 2021-08-14 PROCEDURE — 83036 HEMOGLOBIN GLYCOSYLATED A1C: CPT

## 2021-11-13 ENCOUNTER — HOSPITAL ENCOUNTER (OUTPATIENT)
Dept: LAB | Facility: MEDICAL CENTER | Age: 56
End: 2021-11-13
Attending: FAMILY MEDICINE
Payer: COMMERCIAL

## 2021-11-13 LAB
ALBUMIN SERPL BCP-MCNC: 4.5 G/DL (ref 3.2–4.9)
ALBUMIN/GLOB SERPL: 1.7 G/DL
ALP SERPL-CCNC: 61 U/L (ref 30–99)
ALT SERPL-CCNC: 23 U/L (ref 2–50)
ANION GAP SERPL CALC-SCNC: 13 MMOL/L (ref 7–16)
AST SERPL-CCNC: 25 U/L (ref 12–45)
BASOPHILS # BLD AUTO: 1.2 % (ref 0–1.8)
BASOPHILS # BLD: 0.08 K/UL (ref 0–0.12)
BILIRUB SERPL-MCNC: 0.6 MG/DL (ref 0.1–1.5)
BUN SERPL-MCNC: 14 MG/DL (ref 8–22)
CALCIUM SERPL-MCNC: 9 MG/DL (ref 8.5–10.5)
CHLORIDE SERPL-SCNC: 104 MMOL/L (ref 96–112)
CHOLEST SERPL-MCNC: 114 MG/DL (ref 100–199)
CO2 SERPL-SCNC: 22 MMOL/L (ref 20–33)
CREAT SERPL-MCNC: 0.63 MG/DL (ref 0.5–1.4)
CREAT UR-MCNC: 107.52 MG/DL
EOSINOPHIL # BLD AUTO: 0.21 K/UL (ref 0–0.51)
EOSINOPHIL NFR BLD: 3.3 % (ref 0–6.9)
ERYTHROCYTE [DISTWIDTH] IN BLOOD BY AUTOMATED COUNT: 42.5 FL (ref 35.9–50)
EST. AVERAGE GLUCOSE BLD GHB EST-MCNC: 151 MG/DL
GLOBULIN SER CALC-MCNC: 2.7 G/DL (ref 1.9–3.5)
GLUCOSE SERPL-MCNC: 121 MG/DL (ref 65–99)
HBA1C MFR BLD: 6.9 % (ref 4–5.6)
HCT VFR BLD AUTO: 44.2 % (ref 42–52)
HDLC SERPL-MCNC: 46 MG/DL
HGB BLD-MCNC: 14.9 G/DL (ref 14–18)
IMM GRANULOCYTES # BLD AUTO: 0.03 K/UL (ref 0–0.11)
IMM GRANULOCYTES NFR BLD AUTO: 0.5 % (ref 0–0.9)
LDLC SERPL CALC-MCNC: 47 MG/DL
LYMPHOCYTES # BLD AUTO: 1.56 K/UL (ref 1–4.8)
LYMPHOCYTES NFR BLD: 24.3 % (ref 22–41)
MCH RBC QN AUTO: 28.5 PG (ref 27–33)
MCHC RBC AUTO-ENTMCNC: 33.7 G/DL (ref 33.7–35.3)
MCV RBC AUTO: 84.7 FL (ref 81.4–97.8)
MICROALBUMIN UR-MCNC: 1.5 MG/DL
MICROALBUMIN/CREAT UR: 14 MG/G (ref 0–30)
MONOCYTES # BLD AUTO: 0.64 K/UL (ref 0–0.85)
MONOCYTES NFR BLD AUTO: 10 % (ref 0–13.4)
NEUTROPHILS # BLD AUTO: 3.89 K/UL (ref 1.82–7.42)
NEUTROPHILS NFR BLD: 60.7 % (ref 44–72)
NRBC # BLD AUTO: 0 K/UL
NRBC BLD-RTO: 0 /100 WBC
PLATELET # BLD AUTO: 242 K/UL (ref 164–446)
PMV BLD AUTO: 10.6 FL (ref 9–12.9)
POTASSIUM SERPL-SCNC: 4.1 MMOL/L (ref 3.6–5.5)
PROT SERPL-MCNC: 7.2 G/DL (ref 6–8.2)
PSA SERPL-MCNC: 0.23 NG/ML (ref 0–4)
RBC # BLD AUTO: 5.22 M/UL (ref 4.7–6.1)
SODIUM SERPL-SCNC: 139 MMOL/L (ref 135–145)
TRIGL SERPL-MCNC: 104 MG/DL (ref 0–149)
WBC # BLD AUTO: 6.4 K/UL (ref 4.8–10.8)

## 2021-11-13 PROCEDURE — 82043 UR ALBUMIN QUANTITATIVE: CPT

## 2021-11-13 PROCEDURE — 36415 COLL VENOUS BLD VENIPUNCTURE: CPT

## 2021-11-13 PROCEDURE — 84153 ASSAY OF PSA TOTAL: CPT

## 2021-11-13 PROCEDURE — 80053 COMPREHEN METABOLIC PANEL: CPT

## 2021-11-13 PROCEDURE — 85025 COMPLETE CBC W/AUTO DIFF WBC: CPT

## 2021-11-13 PROCEDURE — 82570 ASSAY OF URINE CREATININE: CPT

## 2021-11-13 PROCEDURE — 80061 LIPID PANEL: CPT

## 2021-11-13 PROCEDURE — 83036 HEMOGLOBIN GLYCOSYLATED A1C: CPT

## 2022-02-12 ENCOUNTER — HOSPITAL ENCOUNTER (OUTPATIENT)
Dept: LAB | Facility: MEDICAL CENTER | Age: 57
End: 2022-02-12
Attending: FAMILY MEDICINE
Payer: COMMERCIAL

## 2022-02-12 LAB
CHOLEST SERPL-MCNC: 114 MG/DL (ref 100–199)
EST. AVERAGE GLUCOSE BLD GHB EST-MCNC: 186 MG/DL
HBA1C MFR BLD: 8.1 % (ref 4–5.6)
HDLC SERPL-MCNC: 42 MG/DL
LDLC SERPL CALC-MCNC: 52 MG/DL
TRIGL SERPL-MCNC: 99 MG/DL (ref 0–149)

## 2022-02-12 PROCEDURE — 80061 LIPID PANEL: CPT

## 2022-02-12 PROCEDURE — 83036 HEMOGLOBIN GLYCOSYLATED A1C: CPT

## 2022-02-12 PROCEDURE — 36415 COLL VENOUS BLD VENIPUNCTURE: CPT

## 2022-05-14 ENCOUNTER — HOSPITAL ENCOUNTER (OUTPATIENT)
Dept: LAB | Facility: MEDICAL CENTER | Age: 57
End: 2022-05-14
Attending: FAMILY MEDICINE
Payer: COMMERCIAL

## 2022-05-14 LAB
CHOLEST SERPL-MCNC: 119 MG/DL (ref 100–199)
EST. AVERAGE GLUCOSE BLD GHB EST-MCNC: 177 MG/DL
HBA1C MFR BLD: 7.8 % (ref 4–5.6)
HDLC SERPL-MCNC: 41 MG/DL
LDLC SERPL CALC-MCNC: 55 MG/DL
TRIGL SERPL-MCNC: 117 MG/DL (ref 0–149)

## 2022-05-14 PROCEDURE — 36415 COLL VENOUS BLD VENIPUNCTURE: CPT

## 2022-05-14 PROCEDURE — 80061 LIPID PANEL: CPT

## 2022-05-14 PROCEDURE — 83036 HEMOGLOBIN GLYCOSYLATED A1C: CPT

## 2022-08-13 ENCOUNTER — HOSPITAL ENCOUNTER (OUTPATIENT)
Dept: LAB | Facility: MEDICAL CENTER | Age: 57
End: 2022-08-13
Attending: FAMILY MEDICINE
Payer: COMMERCIAL

## 2022-08-13 LAB
EST. AVERAGE GLUCOSE BLD GHB EST-MCNC: 197 MG/DL
HBA1C MFR BLD: 8.5 % (ref 4–5.6)

## 2022-08-13 PROCEDURE — 83036 HEMOGLOBIN GLYCOSYLATED A1C: CPT

## 2022-08-13 PROCEDURE — 36415 COLL VENOUS BLD VENIPUNCTURE: CPT

## 2022-11-15 ENCOUNTER — HOSPITAL ENCOUNTER (OUTPATIENT)
Dept: LAB | Facility: MEDICAL CENTER | Age: 57
End: 2022-11-15
Attending: FAMILY MEDICINE
Payer: COMMERCIAL

## 2022-11-15 LAB
ALBUMIN SERPL BCP-MCNC: 4.7 G/DL (ref 3.2–4.9)
ALBUMIN/GLOB SERPL: 1.6 G/DL
ALP SERPL-CCNC: 58 U/L (ref 30–99)
ALT SERPL-CCNC: 26 U/L (ref 2–50)
ANION GAP SERPL CALC-SCNC: 11 MMOL/L (ref 7–16)
AST SERPL-CCNC: 25 U/L (ref 12–45)
BASOPHILS # BLD AUTO: 1 % (ref 0–1.8)
BASOPHILS # BLD: 0.07 K/UL (ref 0–0.12)
BILIRUB SERPL-MCNC: 0.6 MG/DL (ref 0.1–1.5)
BUN SERPL-MCNC: 14 MG/DL (ref 8–22)
CALCIUM SERPL-MCNC: 9.7 MG/DL (ref 8.5–10.5)
CHLORIDE SERPL-SCNC: 101 MMOL/L (ref 96–112)
CHOLEST SERPL-MCNC: 133 MG/DL (ref 100–199)
CO2 SERPL-SCNC: 24 MMOL/L (ref 20–33)
CREAT SERPL-MCNC: 0.75 MG/DL (ref 0.5–1.4)
EOSINOPHIL # BLD AUTO: 0.18 K/UL (ref 0–0.51)
EOSINOPHIL NFR BLD: 2.5 % (ref 0–6.9)
ERYTHROCYTE [DISTWIDTH] IN BLOOD BY AUTOMATED COUNT: 41.4 FL (ref 35.9–50)
EST. AVERAGE GLUCOSE BLD GHB EST-MCNC: 157 MG/DL
FASTING STATUS PATIENT QL REPORTED: NORMAL
GFR SERPLBLD CREATININE-BSD FMLA CKD-EPI: 106 ML/MIN/1.73 M 2
GLOBULIN SER CALC-MCNC: 2.9 G/DL (ref 1.9–3.5)
GLUCOSE SERPL-MCNC: 136 MG/DL (ref 65–99)
HBA1C MFR BLD: 7.1 % (ref 4–5.6)
HCT VFR BLD AUTO: 46.5 % (ref 42–52)
HDLC SERPL-MCNC: 44 MG/DL
HGB BLD-MCNC: 15.7 G/DL (ref 14–18)
IMM GRANULOCYTES # BLD AUTO: 0.05 K/UL (ref 0–0.11)
IMM GRANULOCYTES NFR BLD AUTO: 0.7 % (ref 0–0.9)
LDLC SERPL CALC-MCNC: 69 MG/DL
LYMPHOCYTES # BLD AUTO: 1.48 K/UL (ref 1–4.8)
LYMPHOCYTES NFR BLD: 20.5 % (ref 22–41)
MCH RBC QN AUTO: 28.2 PG (ref 27–33)
MCHC RBC AUTO-ENTMCNC: 33.8 G/DL (ref 33.7–35.3)
MCV RBC AUTO: 83.5 FL (ref 81.4–97.8)
MONOCYTES # BLD AUTO: 0.6 K/UL (ref 0–0.85)
MONOCYTES NFR BLD AUTO: 8.3 % (ref 0–13.4)
NEUTROPHILS # BLD AUTO: 4.85 K/UL (ref 1.82–7.42)
NEUTROPHILS NFR BLD: 67 % (ref 44–72)
NRBC # BLD AUTO: 0 K/UL
NRBC BLD-RTO: 0 /100 WBC
PLATELET # BLD AUTO: 273 K/UL (ref 164–446)
PMV BLD AUTO: 10.5 FL (ref 9–12.9)
POTASSIUM SERPL-SCNC: 4.6 MMOL/L (ref 3.6–5.5)
PROT SERPL-MCNC: 7.6 G/DL (ref 6–8.2)
PSA SERPL-MCNC: 0.24 NG/ML (ref 0–4)
RBC # BLD AUTO: 5.57 M/UL (ref 4.7–6.1)
SODIUM SERPL-SCNC: 136 MMOL/L (ref 135–145)
TRIGL SERPL-MCNC: 102 MG/DL (ref 0–149)
WBC # BLD AUTO: 7.2 K/UL (ref 4.8–10.8)

## 2022-11-15 PROCEDURE — 85025 COMPLETE CBC W/AUTO DIFF WBC: CPT

## 2022-11-15 PROCEDURE — 82043 UR ALBUMIN QUANTITATIVE: CPT

## 2022-11-15 PROCEDURE — 84153 ASSAY OF PSA TOTAL: CPT

## 2022-11-15 PROCEDURE — 83036 HEMOGLOBIN GLYCOSYLATED A1C: CPT

## 2022-11-15 PROCEDURE — 82570 ASSAY OF URINE CREATININE: CPT

## 2022-11-15 PROCEDURE — 80061 LIPID PANEL: CPT

## 2022-11-15 PROCEDURE — 80053 COMPREHEN METABOLIC PANEL: CPT

## 2022-11-15 PROCEDURE — 36415 COLL VENOUS BLD VENIPUNCTURE: CPT

## 2022-11-16 LAB
CREAT UR-MCNC: 41.49 MG/DL
MICROALBUMIN UR-MCNC: 1.9 MG/DL
MICROALBUMIN/CREAT UR: 46 MG/G (ref 0–30)

## 2023-02-25 ENCOUNTER — HOSPITAL ENCOUNTER (OUTPATIENT)
Dept: LAB | Facility: MEDICAL CENTER | Age: 58
End: 2023-02-25
Attending: FAMILY MEDICINE
Payer: COMMERCIAL

## 2023-02-25 LAB
EST. AVERAGE GLUCOSE BLD GHB EST-MCNC: 160 MG/DL
HBA1C MFR BLD: 7.2 % (ref 4–5.6)

## 2023-02-25 PROCEDURE — 83036 HEMOGLOBIN GLYCOSYLATED A1C: CPT

## 2023-02-25 PROCEDURE — 36415 COLL VENOUS BLD VENIPUNCTURE: CPT

## 2023-05-27 ENCOUNTER — HOSPITAL ENCOUNTER (OUTPATIENT)
Dept: LAB | Facility: MEDICAL CENTER | Age: 58
End: 2023-05-27
Attending: FAMILY MEDICINE
Payer: COMMERCIAL

## 2023-05-27 LAB
CHOLEST SERPL-MCNC: 204 MG/DL (ref 100–199)
EST. AVERAGE GLUCOSE BLD GHB EST-MCNC: 192 MG/DL
HBA1C MFR BLD: 8.3 % (ref 4–5.6)
HDLC SERPL-MCNC: 50 MG/DL
LDLC SERPL CALC-MCNC: 126 MG/DL
TRIGL SERPL-MCNC: 139 MG/DL (ref 0–149)

## 2023-05-27 PROCEDURE — 83036 HEMOGLOBIN GLYCOSYLATED A1C: CPT

## 2023-05-27 PROCEDURE — 80061 LIPID PANEL: CPT

## 2023-05-27 PROCEDURE — 36415 COLL VENOUS BLD VENIPUNCTURE: CPT

## 2023-08-26 ENCOUNTER — HOSPITAL ENCOUNTER (OUTPATIENT)
Dept: LAB | Facility: MEDICAL CENTER | Age: 58
End: 2023-08-26
Attending: FAMILY MEDICINE
Payer: COMMERCIAL

## 2023-08-26 LAB
CHOLEST SERPL-MCNC: 194 MG/DL (ref 100–199)
EST. AVERAGE GLUCOSE BLD GHB EST-MCNC: 163 MG/DL
HBA1C MFR BLD: 7.3 % (ref 4–5.6)
HDLC SERPL-MCNC: 37 MG/DL
LDLC SERPL CALC-MCNC: 120 MG/DL
TRIGL SERPL-MCNC: 185 MG/DL (ref 0–149)

## 2023-08-26 PROCEDURE — 80061 LIPID PANEL: CPT

## 2023-08-26 PROCEDURE — 83036 HEMOGLOBIN GLYCOSYLATED A1C: CPT

## 2023-08-26 PROCEDURE — 36415 COLL VENOUS BLD VENIPUNCTURE: CPT

## 2023-11-25 ENCOUNTER — HOSPITAL ENCOUNTER (OUTPATIENT)
Dept: LAB | Facility: MEDICAL CENTER | Age: 58
End: 2023-11-25
Attending: FAMILY MEDICINE
Payer: COMMERCIAL

## 2023-11-25 LAB
ALBUMIN SERPL BCP-MCNC: 4.5 G/DL (ref 3.2–4.9)
ALBUMIN/GLOB SERPL: 1.6 G/DL
ALP SERPL-CCNC: 60 U/L (ref 30–99)
ALT SERPL-CCNC: 21 U/L (ref 2–50)
ANION GAP SERPL CALC-SCNC: 9 MMOL/L (ref 7–16)
AST SERPL-CCNC: 18 U/L (ref 12–45)
BASOPHILS # BLD AUTO: 0.9 % (ref 0–1.8)
BASOPHILS # BLD: 0.07 K/UL (ref 0–0.12)
BILIRUB SERPL-MCNC: 0.7 MG/DL (ref 0.1–1.5)
BUN SERPL-MCNC: 11 MG/DL (ref 8–22)
CALCIUM ALBUM COR SERPL-MCNC: 8.7 MG/DL (ref 8.5–10.5)
CALCIUM SERPL-MCNC: 9.1 MG/DL (ref 8.5–10.5)
CHLORIDE SERPL-SCNC: 101 MMOL/L (ref 96–112)
CHOLEST SERPL-MCNC: 138 MG/DL (ref 100–199)
CO2 SERPL-SCNC: 25 MMOL/L (ref 20–33)
CREAT SERPL-MCNC: 0.81 MG/DL (ref 0.5–1.4)
CREAT UR-MCNC: 66.24 MG/DL
EOSINOPHIL # BLD AUTO: 0.64 K/UL (ref 0–0.51)
EOSINOPHIL NFR BLD: 8.5 % (ref 0–6.9)
ERYTHROCYTE [DISTWIDTH] IN BLOOD BY AUTOMATED COUNT: 41.8 FL (ref 35.9–50)
EST. AVERAGE GLUCOSE BLD GHB EST-MCNC: 151 MG/DL
GFR SERPLBLD CREATININE-BSD FMLA CKD-EPI: 103 ML/MIN/1.73 M 2
GLOBULIN SER CALC-MCNC: 2.9 G/DL (ref 1.9–3.5)
GLUCOSE SERPL-MCNC: 134 MG/DL (ref 65–99)
HBA1C MFR BLD: 6.9 % (ref 4–5.6)
HCT VFR BLD AUTO: 43 % (ref 42–52)
HDLC SERPL-MCNC: 34 MG/DL
HGB BLD-MCNC: 14.6 G/DL (ref 14–18)
IMM GRANULOCYTES # BLD AUTO: 0.05 K/UL (ref 0–0.11)
IMM GRANULOCYTES NFR BLD AUTO: 0.7 % (ref 0–0.9)
LDLC SERPL CALC-MCNC: 53 MG/DL
LYMPHOCYTES # BLD AUTO: 1.5 K/UL (ref 1–4.8)
LYMPHOCYTES NFR BLD: 20 % (ref 22–41)
MCH RBC QN AUTO: 28.6 PG (ref 27–33)
MCHC RBC AUTO-ENTMCNC: 34 G/DL (ref 32.3–36.5)
MCV RBC AUTO: 84.1 FL (ref 81.4–97.8)
MICROALBUMIN UR-MCNC: 3.7 MG/DL
MICROALBUMIN/CREAT UR: 56 MG/G (ref 0–30)
MONOCYTES # BLD AUTO: 0.58 K/UL (ref 0–0.85)
MONOCYTES NFR BLD AUTO: 7.7 % (ref 0–13.4)
NEUTROPHILS # BLD AUTO: 4.65 K/UL (ref 1.82–7.42)
NEUTROPHILS NFR BLD: 62.2 % (ref 44–72)
NRBC # BLD AUTO: 0 K/UL
NRBC BLD-RTO: 0 /100 WBC (ref 0–0.2)
PLATELET # BLD AUTO: 250 K/UL (ref 164–446)
PMV BLD AUTO: 9.9 FL (ref 9–12.9)
POTASSIUM SERPL-SCNC: 4.3 MMOL/L (ref 3.6–5.5)
PROT SERPL-MCNC: 7.4 G/DL (ref 6–8.2)
PSA SERPL-MCNC: 0.27 NG/ML (ref 0–4)
RBC # BLD AUTO: 5.11 M/UL (ref 4.7–6.1)
SODIUM SERPL-SCNC: 135 MMOL/L (ref 135–145)
TRIGL SERPL-MCNC: 253 MG/DL (ref 0–149)
WBC # BLD AUTO: 7.5 K/UL (ref 4.8–10.8)

## 2023-11-25 PROCEDURE — 80061 LIPID PANEL: CPT

## 2023-11-25 PROCEDURE — 80053 COMPREHEN METABOLIC PANEL: CPT

## 2023-11-25 PROCEDURE — 36415 COLL VENOUS BLD VENIPUNCTURE: CPT

## 2023-11-25 PROCEDURE — 83036 HEMOGLOBIN GLYCOSYLATED A1C: CPT

## 2023-11-25 PROCEDURE — 85025 COMPLETE CBC W/AUTO DIFF WBC: CPT

## 2023-11-25 PROCEDURE — 84153 ASSAY OF PSA TOTAL: CPT

## 2023-11-25 PROCEDURE — 82570 ASSAY OF URINE CREATININE: CPT

## 2023-11-25 PROCEDURE — 82043 UR ALBUMIN QUANTITATIVE: CPT

## 2024-01-17 NOTE — PROGRESS NOTES
"Pharmacy Kinetics 54 y.o. male on vancomycin day # 1 2020    Currently on Vancomycin 1400 mg iv q12hr    Indication for Treatment: Diabetic foot infection     Pertinent history per medical record: Admitted on 2/3/2020 for diabetic foot infection.  Patient's right toe has been swelling for the last few weeks.  MRI of right foot ordered.  Empiric antibiotics started.      Other antibiotics: Unasyn 3g q6h    Allergies: Patient has no known allergies.     List concerns for renal function: T2DM    Pertinent cultures to date:     Blood culture x2 -- in process    MRSA nares swab if pneumonia is a concern (ordered/positive/negative/n-a): n/a    Recent Labs     20  1833   WBC 13.2*   NEUTSPOLYS 78.10*     Recent Labs     20  1833   BUN 11   CREATININE 0.78   ALBUMIN 4.1     No results for input(s): VANCOTROUGH, VANCOPEAK, VANCORANDOM in the last 72 hours.    Intake/Output Summary (Last 24 hours) at 2020 0241  Last data filed at 2020 0008  Gross per 24 hour   Intake 100 ml   Output 600 ml   Net -500 ml      /87   Pulse 93   Temp 37.2 °C (98.9 °F) (Temporal)   Resp 16   Ht 1.6 m (5' 3\")   Wt 79.6 kg (175 lb 7.8 oz)   SpO2 98%  Temp (24hrs), Av.7 °C (98.1 °F), Min:36.2 °C (97.2 °F), Max:37.2 °C (98.9 °F)      A/P   1. Vancomycin dose change: New start  2. Next vancomycin level: Prior to 4th dose (not ordered)  3. Goal trough: 12-16 mcg/mL  4. Comments: Patient has little risk for accumulation.  No prior history of vancomycin to guide dosing.  Will start a maintenance dose per protocol and check a level at steady state.  Pharmacy will continue to follow.      Efrain Palacios, PharmD      " (1) Other Medications/None

## 2024-02-24 ENCOUNTER — HOSPITAL ENCOUNTER (OUTPATIENT)
Dept: LAB | Facility: MEDICAL CENTER | Age: 59
End: 2024-02-24
Attending: FAMILY MEDICINE
Payer: COMMERCIAL

## 2024-02-24 LAB
CHOLEST SERPL-MCNC: 147 MG/DL (ref 100–199)
EST. AVERAGE GLUCOSE BLD GHB EST-MCNC: 174 MG/DL
FASTING STATUS PATIENT QL REPORTED: NORMAL
HBA1C MFR BLD: 7.7 % (ref 4–5.6)
HDLC SERPL-MCNC: 40 MG/DL
LDLC SERPL CALC-MCNC: 65 MG/DL
TRIGL SERPL-MCNC: 212 MG/DL (ref 0–149)

## 2024-02-24 PROCEDURE — 80061 LIPID PANEL: CPT

## 2024-02-24 PROCEDURE — 36415 COLL VENOUS BLD VENIPUNCTURE: CPT

## 2024-02-24 PROCEDURE — 83036 HEMOGLOBIN GLYCOSYLATED A1C: CPT

## 2024-05-25 ENCOUNTER — HOSPITAL ENCOUNTER (OUTPATIENT)
Dept: LAB | Facility: MEDICAL CENTER | Age: 59
End: 2024-05-25
Attending: FAMILY MEDICINE
Payer: COMMERCIAL

## 2024-05-25 LAB
CHOLEST SERPL-MCNC: 136 MG/DL (ref 100–199)
EST. AVERAGE GLUCOSE BLD GHB EST-MCNC: 312 MG/DL
FASTING STATUS PATIENT QL REPORTED: NORMAL
HBA1C MFR BLD: 12.5 % (ref 4–5.6)
HDLC SERPL-MCNC: 38 MG/DL
LDLC SERPL CALC-MCNC: 63 MG/DL
TRIGL SERPL-MCNC: 177 MG/DL (ref 0–149)

## 2024-08-24 ENCOUNTER — HOSPITAL ENCOUNTER (OUTPATIENT)
Dept: LAB | Facility: MEDICAL CENTER | Age: 59
End: 2024-08-24
Attending: FAMILY MEDICINE
Payer: COMMERCIAL

## 2024-08-24 LAB
ALBUMIN SERPL BCP-MCNC: 4.2 G/DL (ref 3.2–4.9)
ALBUMIN/GLOB SERPL: 1.4 G/DL
ALP SERPL-CCNC: 63 U/L (ref 30–99)
ALT SERPL-CCNC: 19 U/L (ref 2–50)
ANION GAP SERPL CALC-SCNC: 12 MMOL/L (ref 7–16)
AST SERPL-CCNC: 22 U/L (ref 12–45)
BASOPHILS # BLD AUTO: 0.8 % (ref 0–1.8)
BASOPHILS # BLD: 0.05 K/UL (ref 0–0.12)
BILIRUB SERPL-MCNC: 0.6 MG/DL (ref 0.1–1.5)
BUN SERPL-MCNC: 14 MG/DL (ref 8–22)
CALCIUM ALBUM COR SERPL-MCNC: 9.2 MG/DL (ref 8.5–10.5)
CALCIUM SERPL-MCNC: 9.4 MG/DL (ref 8.5–10.5)
CHLORIDE SERPL-SCNC: 99 MMOL/L (ref 96–112)
CHOLEST SERPL-MCNC: 129 MG/DL (ref 100–199)
CO2 SERPL-SCNC: 23 MMOL/L (ref 20–33)
CREAT SERPL-MCNC: 0.73 MG/DL (ref 0.5–1.4)
CREAT UR-MCNC: 44.54 MG/DL
EOSINOPHIL # BLD AUTO: 0.22 K/UL (ref 0–0.51)
EOSINOPHIL NFR BLD: 3.5 % (ref 0–6.9)
ERYTHROCYTE [DISTWIDTH] IN BLOOD BY AUTOMATED COUNT: 40 FL (ref 35.9–50)
EST. AVERAGE GLUCOSE BLD GHB EST-MCNC: 263 MG/DL
GFR SERPLBLD CREATININE-BSD FMLA CKD-EPI: 106 ML/MIN/1.73 M 2
GLOBULIN SER CALC-MCNC: 2.9 G/DL (ref 1.9–3.5)
GLUCOSE SERPL-MCNC: 238 MG/DL (ref 65–99)
HBA1C MFR BLD: 10.8 % (ref 4–5.6)
HCT VFR BLD AUTO: 42.1 % (ref 42–52)
HDLC SERPL-MCNC: 43 MG/DL
HGB BLD-MCNC: 14.4 G/DL (ref 14–18)
IMM GRANULOCYTES # BLD AUTO: 0.03 K/UL (ref 0–0.11)
IMM GRANULOCYTES NFR BLD AUTO: 0.5 % (ref 0–0.9)
LDLC SERPL CALC-MCNC: 52 MG/DL
LYMPHOCYTES # BLD AUTO: 1.4 K/UL (ref 1–4.8)
LYMPHOCYTES NFR BLD: 22.2 % (ref 22–41)
MCH RBC QN AUTO: 28.2 PG (ref 27–33)
MCHC RBC AUTO-ENTMCNC: 34.2 G/DL (ref 32.3–36.5)
MCV RBC AUTO: 82.4 FL (ref 81.4–97.8)
MICROALBUMIN UR-MCNC: 5.5 MG/DL
MICROALBUMIN/CREAT UR: 123 MG/G (ref 0–30)
MONOCYTES # BLD AUTO: 0.58 K/UL (ref 0–0.85)
MONOCYTES NFR BLD AUTO: 9.2 % (ref 0–13.4)
NEUTROPHILS # BLD AUTO: 4.03 K/UL (ref 1.82–7.42)
NEUTROPHILS NFR BLD: 63.8 % (ref 44–72)
NRBC # BLD AUTO: 0 K/UL
NRBC BLD-RTO: 0 /100 WBC (ref 0–0.2)
PLATELET # BLD AUTO: 231 K/UL (ref 164–446)
PMV BLD AUTO: 10.3 FL (ref 9–12.9)
POTASSIUM SERPL-SCNC: 4.9 MMOL/L (ref 3.6–5.5)
PROT SERPL-MCNC: 7.1 G/DL (ref 6–8.2)
RBC # BLD AUTO: 5.11 M/UL (ref 4.7–6.1)
SODIUM SERPL-SCNC: 134 MMOL/L (ref 135–145)
TRIGL SERPL-MCNC: 170 MG/DL (ref 0–149)
WBC # BLD AUTO: 6.3 K/UL (ref 4.8–10.8)

## 2024-08-24 PROCEDURE — 80061 LIPID PANEL: CPT

## 2024-08-24 PROCEDURE — 83036 HEMOGLOBIN GLYCOSYLATED A1C: CPT

## 2024-08-24 PROCEDURE — 82043 UR ALBUMIN QUANTITATIVE: CPT

## 2024-08-24 PROCEDURE — 82570 ASSAY OF URINE CREATININE: CPT

## 2024-08-24 PROCEDURE — 36415 COLL VENOUS BLD VENIPUNCTURE: CPT

## 2024-08-24 PROCEDURE — 80053 COMPREHEN METABOLIC PANEL: CPT

## 2024-08-24 PROCEDURE — 85025 COMPLETE CBC W/AUTO DIFF WBC: CPT

## 2024-11-30 ENCOUNTER — HOSPITAL ENCOUNTER (OUTPATIENT)
Dept: LAB | Facility: MEDICAL CENTER | Age: 59
End: 2024-11-30
Attending: FAMILY MEDICINE
Payer: COMMERCIAL

## 2024-11-30 LAB
EST. AVERAGE GLUCOSE BLD GHB EST-MCNC: 192 MG/DL
HBA1C MFR BLD: 8.3 % (ref 4–5.6)

## 2024-11-30 PROCEDURE — 83036 HEMOGLOBIN GLYCOSYLATED A1C: CPT

## 2024-11-30 PROCEDURE — 36415 COLL VENOUS BLD VENIPUNCTURE: CPT

## 2025-03-01 ENCOUNTER — HOSPITAL ENCOUNTER (OUTPATIENT)
Dept: LAB | Facility: MEDICAL CENTER | Age: 60
End: 2025-03-01
Attending: FAMILY MEDICINE
Payer: COMMERCIAL

## 2025-03-01 LAB
ALBUMIN SERPL BCP-MCNC: 4.3 G/DL (ref 3.2–4.9)
ALBUMIN/GLOB SERPL: 1.3 G/DL
ALP SERPL-CCNC: 70 U/L (ref 30–99)
ALT SERPL-CCNC: 31 U/L (ref 2–50)
ANION GAP SERPL CALC-SCNC: 12 MMOL/L (ref 7–16)
AST SERPL-CCNC: 30 U/L (ref 12–45)
BASOPHILS # BLD AUTO: 1 % (ref 0–1.8)
BASOPHILS # BLD: 0.06 K/UL (ref 0–0.12)
BILIRUB SERPL-MCNC: 0.4 MG/DL (ref 0.1–1.5)
BUN SERPL-MCNC: 19 MG/DL (ref 8–22)
CALCIUM ALBUM COR SERPL-MCNC: 9.1 MG/DL (ref 8.5–10.5)
CALCIUM SERPL-MCNC: 9.3 MG/DL (ref 8.5–10.5)
CHLORIDE SERPL-SCNC: 103 MMOL/L (ref 96–112)
CHOLEST SERPL-MCNC: 131 MG/DL (ref 100–199)
CO2 SERPL-SCNC: 21 MMOL/L (ref 20–33)
CREAT SERPL-MCNC: 0.89 MG/DL (ref 0.5–1.4)
EOSINOPHIL # BLD AUTO: 0.13 K/UL (ref 0–0.51)
EOSINOPHIL NFR BLD: 2.1 % (ref 0–6.9)
ERYTHROCYTE [DISTWIDTH] IN BLOOD BY AUTOMATED COUNT: 44.6 FL (ref 35.9–50)
EST. AVERAGE GLUCOSE BLD GHB EST-MCNC: 232 MG/DL
FASTING STATUS PATIENT QL REPORTED: NORMAL
GFR SERPLBLD CREATININE-BSD FMLA CKD-EPI: 99 ML/MIN/1.73 M 2
GLOBULIN SER CALC-MCNC: 3.3 G/DL (ref 1.9–3.5)
GLUCOSE SERPL-MCNC: 141 MG/DL (ref 65–99)
HBA1C MFR BLD: 9.7 % (ref 4–5.6)
HCT VFR BLD AUTO: 47 % (ref 42–52)
HDLC SERPL-MCNC: 40 MG/DL
HGB BLD-MCNC: 15.1 G/DL (ref 14–18)
IMM GRANULOCYTES # BLD AUTO: 0.03 K/UL (ref 0–0.11)
IMM GRANULOCYTES NFR BLD AUTO: 0.5 % (ref 0–0.9)
LDLC SERPL CALC-MCNC: 52 MG/DL
LYMPHOCYTES # BLD AUTO: 1.41 K/UL (ref 1–4.8)
LYMPHOCYTES NFR BLD: 23.1 % (ref 22–41)
MCH RBC QN AUTO: 27.8 PG (ref 27–33)
MCHC RBC AUTO-ENTMCNC: 32.1 G/DL (ref 32.3–36.5)
MCV RBC AUTO: 86.6 FL (ref 81.4–97.8)
MONOCYTES # BLD AUTO: 0.55 K/UL (ref 0–0.85)
MONOCYTES NFR BLD AUTO: 9 % (ref 0–13.4)
NEUTROPHILS # BLD AUTO: 3.92 K/UL (ref 1.82–7.42)
NEUTROPHILS NFR BLD: 64.3 % (ref 44–72)
NRBC # BLD AUTO: 0 K/UL
NRBC BLD-RTO: 0 /100 WBC (ref 0–0.2)
PLATELET # BLD AUTO: 255 K/UL (ref 164–446)
PMV BLD AUTO: 10.4 FL (ref 9–12.9)
POTASSIUM SERPL-SCNC: 4.4 MMOL/L (ref 3.6–5.5)
PROT SERPL-MCNC: 7.6 G/DL (ref 6–8.2)
RBC # BLD AUTO: 5.43 M/UL (ref 4.7–6.1)
SODIUM SERPL-SCNC: 136 MMOL/L (ref 135–145)
TRIGL SERPL-MCNC: 193 MG/DL (ref 0–149)
WBC # BLD AUTO: 6.1 K/UL (ref 4.8–10.8)

## 2025-03-01 PROCEDURE — 80053 COMPREHEN METABOLIC PANEL: CPT

## 2025-03-01 PROCEDURE — 83036 HEMOGLOBIN GLYCOSYLATED A1C: CPT

## 2025-03-01 PROCEDURE — 36415 COLL VENOUS BLD VENIPUNCTURE: CPT

## 2025-03-01 PROCEDURE — 85025 COMPLETE CBC W/AUTO DIFF WBC: CPT

## 2025-03-01 PROCEDURE — 80061 LIPID PANEL: CPT

## 2025-05-31 ENCOUNTER — HOSPITAL ENCOUNTER (OUTPATIENT)
Dept: LAB | Facility: MEDICAL CENTER | Age: 60
End: 2025-05-31
Attending: FAMILY MEDICINE
Payer: COMMERCIAL

## 2025-05-31 LAB
EST. AVERAGE GLUCOSE BLD GHB EST-MCNC: 186 MG/DL
HBA1C MFR BLD: 8.1 % (ref 4–5.6)

## 2025-05-31 PROCEDURE — 83036 HEMOGLOBIN GLYCOSYLATED A1C: CPT

## 2025-05-31 PROCEDURE — 36415 COLL VENOUS BLD VENIPUNCTURE: CPT

## 2025-08-11 ENCOUNTER — OCCUPATIONAL MEDICINE (OUTPATIENT)
Dept: URGENT CARE | Facility: CLINIC | Age: 60
End: 2025-08-11
Payer: OTHER MISCELLANEOUS

## 2025-08-11 ENCOUNTER — NON-PROVIDER VISIT (OUTPATIENT)
Dept: URGENT CARE | Facility: CLINIC | Age: 60
End: 2025-08-11
Payer: COMMERCIAL

## 2025-08-11 VITALS
SYSTOLIC BLOOD PRESSURE: 120 MMHG | TEMPERATURE: 96.6 F | HEIGHT: 64 IN | DIASTOLIC BLOOD PRESSURE: 72 MMHG | HEART RATE: 78 BPM | BODY MASS INDEX: 31.76 KG/M2 | RESPIRATION RATE: 16 BRPM | OXYGEN SATURATION: 98 % | WEIGHT: 186 LBS

## 2025-08-11 DIAGNOSIS — S39.012A STRAIN OF LUMBAR REGION, INITIAL ENCOUNTER: ICD-10-CM

## 2025-08-11 DIAGNOSIS — S09.90XA INJURY OF HEAD, INITIAL ENCOUNTER: ICD-10-CM

## 2025-08-11 DIAGNOSIS — Z02.83 ENCOUNTER FOR DRUG SCREENING: Primary | ICD-10-CM

## 2025-08-11 DIAGNOSIS — S06.0X0A CONCUSSION WITHOUT LOSS OF CONSCIOUSNESS, INITIAL ENCOUNTER: ICD-10-CM

## 2025-08-11 LAB
BREATH ALCOHOL COMMENT: NORMAL
POC BREATHALIZER: 0 PERCENT (ref 0–0.01)

## 2025-08-11 PROCEDURE — 3078F DIAST BP <80 MM HG: CPT | Performed by: PHYSICIAN ASSISTANT

## 2025-08-11 PROCEDURE — 99204 OFFICE O/P NEW MOD 45 MIN: CPT | Performed by: PHYSICIAN ASSISTANT

## 2025-08-11 PROCEDURE — 82075 ASSAY OF BREATH ETHANOL: CPT | Performed by: PHYSICIAN ASSISTANT

## 2025-08-11 PROCEDURE — 3074F SYST BP LT 130 MM HG: CPT | Performed by: PHYSICIAN ASSISTANT

## 2025-08-11 PROCEDURE — 80305 DRUG TEST PRSMV DIR OPT OBS: CPT | Performed by: PHYSICIAN ASSISTANT

## 2025-08-11 RX ORDER — SITAGLIPTIN 100 MG/1
TABLET, FILM COATED ORAL
COMMUNITY
Start: 2025-06-06

## 2025-08-11 ASSESSMENT — FIBROSIS 4 INDEX: FIB4 SCORE: 1.23

## 2025-08-14 ENCOUNTER — OCCUPATIONAL MEDICINE (OUTPATIENT)
Dept: URGENT CARE | Facility: CLINIC | Age: 60
End: 2025-08-14
Payer: OTHER MISCELLANEOUS

## 2025-08-14 VITALS
WEIGHT: 186 LBS | SYSTOLIC BLOOD PRESSURE: 132 MMHG | TEMPERATURE: 97 F | HEIGHT: 64 IN | RESPIRATION RATE: 18 BRPM | OXYGEN SATURATION: 97 % | BODY MASS INDEX: 31.76 KG/M2 | DIASTOLIC BLOOD PRESSURE: 76 MMHG | HEART RATE: 63 BPM

## 2025-08-14 DIAGNOSIS — S06.0X0D CONCUSSION WITHOUT LOSS OF CONSCIOUSNESS, SUBSEQUENT ENCOUNTER: ICD-10-CM

## 2025-08-14 DIAGNOSIS — S39.012D STRAIN OF LUMBAR REGION, SUBSEQUENT ENCOUNTER: ICD-10-CM

## 2025-08-14 DIAGNOSIS — S09.90XD INJURY OF HEAD, SUBSEQUENT ENCOUNTER: Primary | ICD-10-CM

## 2025-08-14 PROCEDURE — 3075F SYST BP GE 130 - 139MM HG: CPT | Performed by: PHYSICIAN ASSISTANT

## 2025-08-14 PROCEDURE — 3078F DIAST BP <80 MM HG: CPT | Performed by: PHYSICIAN ASSISTANT

## 2025-08-14 PROCEDURE — 99213 OFFICE O/P EST LOW 20 MIN: CPT | Performed by: PHYSICIAN ASSISTANT

## 2025-08-14 ASSESSMENT — FIBROSIS 4 INDEX: FIB4 SCORE: 1.23

## 2025-08-20 ENCOUNTER — TELEPHONE (OUTPATIENT)
Dept: OCCUPATIONAL MEDICINE | Facility: CLINIC | Age: 60
End: 2025-08-20
Payer: COMMERCIAL

## 2025-08-21 ENCOUNTER — OCCUPATIONAL MEDICINE (OUTPATIENT)
Dept: OCCUPATIONAL MEDICINE | Facility: CLINIC | Age: 60
End: 2025-08-21
Payer: OTHER MISCELLANEOUS

## 2025-08-21 VITALS
WEIGHT: 183.6 LBS | RESPIRATION RATE: 16 BRPM | SYSTOLIC BLOOD PRESSURE: 122 MMHG | BODY MASS INDEX: 31.34 KG/M2 | HEIGHT: 64 IN | HEART RATE: 96 BPM | OXYGEN SATURATION: 97 % | DIASTOLIC BLOOD PRESSURE: 70 MMHG | TEMPERATURE: 97.5 F

## 2025-08-21 DIAGNOSIS — S39.012D STRAIN OF LUMBAR REGION, SUBSEQUENT ENCOUNTER: ICD-10-CM

## 2025-08-21 DIAGNOSIS — S06.0X0D CONCUSSION WITHOUT LOSS OF CONSCIOUSNESS, SUBSEQUENT ENCOUNTER: Primary | ICD-10-CM

## 2025-08-21 RX ORDER — AMLODIPINE BESYLATE 10 MG/1
TABLET ORAL
COMMUNITY
Start: 2025-06-04

## 2025-08-21 RX ORDER — LISINOPRIL 40 MG/1
TABLET ORAL
COMMUNITY
Start: 2025-06-04

## 2025-08-21 RX ORDER — DAPAGLIFLOZIN 10 MG/1
TABLET, FILM COATED ORAL
COMMUNITY
Start: 2025-06-06

## 2025-08-21 RX ORDER — DICLOFENAC SODIUM 75 MG/1
75 TABLET, DELAYED RELEASE ORAL 2 TIMES DAILY
Qty: 60 TABLET | Refills: 0 | Status: SHIPPED | OUTPATIENT
Start: 2025-08-21

## 2025-08-21 ASSESSMENT — FIBROSIS 4 INDEX: FIB4 SCORE: 1.23

## 2025-08-30 ENCOUNTER — HOSPITAL ENCOUNTER (OUTPATIENT)
Dept: LAB | Facility: MEDICAL CENTER | Age: 60
End: 2025-08-30
Attending: FAMILY MEDICINE
Payer: COMMERCIAL

## 2025-08-30 LAB
ALBUMIN SERPL BCP-MCNC: 4.5 G/DL (ref 3.2–4.9)
ALBUMIN/GLOB SERPL: 1.5 G/DL
ALP SERPL-CCNC: 67 U/L (ref 30–99)
ALT SERPL-CCNC: 25 U/L (ref 2–50)
ANION GAP SERPL CALC-SCNC: 12 MMOL/L (ref 7–16)
AST SERPL-CCNC: 27 U/L (ref 12–45)
BILIRUB SERPL-MCNC: 0.7 MG/DL (ref 0.1–1.5)
BUN SERPL-MCNC: 23 MG/DL (ref 8–22)
CALCIUM ALBUM COR SERPL-MCNC: 9.1 MG/DL (ref 8.5–10.5)
CALCIUM SERPL-MCNC: 9.5 MG/DL (ref 8.5–10.5)
CHLORIDE SERPL-SCNC: 102 MMOL/L (ref 96–112)
CHOLEST SERPL-MCNC: 129 MG/DL (ref 100–199)
CO2 SERPL-SCNC: 21 MMOL/L (ref 20–33)
CREAT SERPL-MCNC: 0.97 MG/DL (ref 0.5–1.4)
EST. AVERAGE GLUCOSE BLD GHB EST-MCNC: 186 MG/DL
GFR SERPLBLD CREATININE-BSD FMLA CKD-EPI: 90 ML/MIN/1.73 M 2
GLOBULIN SER CALC-MCNC: 3 G/DL (ref 1.9–3.5)
GLUCOSE SERPL-MCNC: 144 MG/DL (ref 65–99)
HBA1C MFR BLD: 8.1 % (ref 4–5.6)
HDLC SERPL-MCNC: 36 MG/DL
LDLC SERPL CALC-MCNC: 48 MG/DL
POTASSIUM SERPL-SCNC: 4.4 MMOL/L (ref 3.6–5.5)
PROT SERPL-MCNC: 7.5 G/DL (ref 6–8.2)
SODIUM SERPL-SCNC: 135 MMOL/L (ref 135–145)
TRIGL SERPL-MCNC: 227 MG/DL (ref 0–149)

## 2025-08-30 PROCEDURE — 80061 LIPID PANEL: CPT

## 2025-08-30 PROCEDURE — 36415 COLL VENOUS BLD VENIPUNCTURE: CPT

## 2025-08-30 PROCEDURE — 83036 HEMOGLOBIN GLYCOSYLATED A1C: CPT

## 2025-08-30 PROCEDURE — 80053 COMPREHEN METABOLIC PANEL: CPT

## (undated) DEVICE — KIT ROOM DECONTAMINATION

## (undated) DEVICE — SUCTION INSTRUMENT YANKAUER BULBOUS TIP W/O VENT (50EA/CA)

## (undated) DEVICE — PACK LOWER EXTREMITY - (2/CA)

## (undated) DEVICE — SUTURE GENERAL

## (undated) DEVICE — GLOVE BIOGEL ECLIPSE  PF LATEX SIZE 6.5 (50PR/BX)

## (undated) DEVICE — GOWN SURGEONS X-LARGE - DISP. (30/CA)

## (undated) DEVICE — GLOVE SZ 7.5 BIOGEL PI MICRO - PF LF (50PR/BX)

## (undated) DEVICE — DRESSING 3X8 ADAPTIC GAUZE - NON-ADHERING (36/PK 6PK/BX)

## (undated) DEVICE — GOWN WARMING STANDARD FLEX - (30/CA)

## (undated) DEVICE — GLOVE BIOGEL PI INDICATOR SZ 8.0 SURGICAL PF LF -(50/BX 4BX/CA)

## (undated) DEVICE — MASK ANESTHESIA ADULT  - (100/CA)

## (undated) DEVICE — SET IRRIGATION CYSTOSCOPY TUBE L80 IN (20EA/CA)

## (undated) DEVICE — SWAB ANAEROBIC SPEC.COLLECTOR - (25/PK 4PK/CA 100EA/CA)

## (undated) DEVICE — SET LEADWIRE 5 LEAD BEDSIDE DISPOSABLE ECG (1SET OF 5/EA)

## (undated) DEVICE — CHLORAPREP 26 ML APPLICATOR - ORANGE TINT(25/CA)

## (undated) DEVICE — SUTURE 2-0 PDS II CT-2 - (36/BX)

## (undated) DEVICE — VAC CANISTER W/GEL 500ML - FITS NEW MACHINES (10EA/CA)

## (undated) DEVICE — GLOVE BIOGEL ECLIPSE PF LATEX SIZE 8.0  (50PR/BX)

## (undated) DEVICE — CANISTER SUCTION 3000ML MECHANICAL FILTER AUTO SHUTOFF MEDI-VAC NONSTERILE LF DISP  (40EA/CA)

## (undated) DEVICE — NEPTUNE 4 PORT MANIFOLD - (20/PK)

## (undated) DEVICE — ELECTRODE DUAL RETURN W/ CORD - (50/PK)

## (undated) DEVICE — GLOVE BIOGEL INDICATOR SZ 8.5 SURGICAL PF LTX - (50/BX 4BX/CA)

## (undated) DEVICE — GLOVE BIOGEL PI INDICATOR SZ 7.0 SURGICAL PF LF - (50/BX 4BX/CA)

## (undated) DEVICE — SET EXTENSION WITH 2 PORTS (48EA/CA) ***PART #2C8610 IS A SUBSTITUTE*****

## (undated) DEVICE — SODIUM CHL. IRRIGATION 0.9% 3000ML (4EA/CA 65CA/PF)

## (undated) DEVICE — GLOVE BIOGEL SZ 6 PF LATEX - (50EA/BX 4BX/CA)

## (undated) DEVICE — GLOVE BIOGEL INDICATOR SZ 7SURGICAL PF LTX - (50/BX 4BX/CA)

## (undated) DEVICE — GLOVE SZ 6.5 BIOGEL PI MICRO - PF LF (50PR/BX)

## (undated) DEVICE — PADDING CAST 4 IN STERILE - 4 X 4 YDS (24/CA)

## (undated) DEVICE — STOCKINET BIAS 4 IN STERILE - (20/CA)

## (undated) DEVICE — SENSOR SPO2 NEO LNCS ADHESIVE (20/BX) SEE USER NOTES

## (undated) DEVICE — HEAD HOLDER JUNIOR/ADULT

## (undated) DEVICE — DRESSING KIT V.A.C. SENSA T.R.A.C. SMALL (10EA/CA)

## (undated) DEVICE — DETERGENT RENUZYME PLUS 10 OZ PACKET (50/BX)

## (undated) DEVICE — BLADE SURGICAL #15 - (50/BX 3BX/CA)

## (undated) DEVICE — BOVIE BLADE COATED - (50/PK)

## (undated) DEVICE — GLOVE BIOGEL PI INDICATOR SZ 6.5 SURGICAL PF LF - (50/BX 4BX/CA)

## (undated) DEVICE — SUTURE 3-0 ETHILON PS-1 (36PK/BX)

## (undated) DEVICE — SODIUM CHL IRRIGATION 0.9% 1000ML (12EA/CA)

## (undated) DEVICE — TUBING CLEARLINK DUO-VENT - C-FLO (48EA/CA)

## (undated) DEVICE — TIP INTPLS HFLO ML ORFC BTRY - (12/CS)  FOR SURGILAV

## (undated) DEVICE — TOURNIQUET CUFF 18 X 3 ONE PORT DISP - STERILE (10/BX)

## (undated) DEVICE — ELECTRODE 850 FOAM ADHESIVE - HYDROGEL RADIOTRNSPRNT (50/PK)

## (undated) DEVICE — PROTECTOR ULNA NERVE - (36PR/CA)

## (undated) DEVICE — CORDS BIPOLAR COAGULATION - 12FT STERILE DISP. (10EA/BX)

## (undated) DEVICE — GLOVE BIOGEL SZ 6.5 SURGICAL PF LTX (50PR/BX 4BX/CA)

## (undated) DEVICE — SPONGE GAUZESTER 4 X 4 4PLY - (128PK/CA)

## (undated) DEVICE — LACTATED RINGERS INJ 1000 ML - (14EA/CA 60CA/PF)

## (undated) DEVICE — KIT ANESTHESIA W/CIRCUIT & 3/LT BAG W/FILTER (20EA/CA)

## (undated) DEVICE — GLOVE BIOGEL ECLIPSE PF LATEX SIZE 9.0